# Patient Record
Sex: MALE | Race: WHITE | Employment: OTHER | URBAN - METROPOLITAN AREA
[De-identification: names, ages, dates, MRNs, and addresses within clinical notes are randomized per-mention and may not be internally consistent; named-entity substitution may affect disease eponyms.]

---

## 2017-03-19 ENCOUNTER — MYC MEDICAL ADVICE (OUTPATIENT)
Dept: PULMONOLOGY | Facility: CLINIC | Age: 77
End: 2017-03-19

## 2017-03-19 DIAGNOSIS — J84.9 ILD (INTERSTITIAL LUNG DISEASE) (H): ICD-10-CM

## 2017-03-21 RX ORDER — PREDNISONE 1 MG/1
TABLET ORAL
Qty: 120 TABLET | Refills: 3 | Status: SHIPPED | OUTPATIENT
Start: 2017-03-21 | End: 2017-05-04

## 2017-04-30 ENCOUNTER — MYC MEDICAL ADVICE (OUTPATIENT)
Dept: PULMONOLOGY | Facility: CLINIC | Age: 77
End: 2017-04-30

## 2017-04-30 DIAGNOSIS — J84.9 ILD (INTERSTITIAL LUNG DISEASE) (H): ICD-10-CM

## 2017-05-04 ENCOUNTER — MYC MEDICAL ADVICE (OUTPATIENT)
Dept: PULMONOLOGY | Facility: CLINIC | Age: 77
End: 2017-05-04

## 2017-05-04 DIAGNOSIS — J84.9 ILD (INTERSTITIAL LUNG DISEASE) (H): ICD-10-CM

## 2017-05-04 RX ORDER — PREDNISONE 1 MG/1
TABLET ORAL
Qty: 360 TABLET | Refills: 3 | Status: SHIPPED | OUTPATIENT
Start: 2017-05-04 | End: 2018-01-01

## 2017-05-04 RX ORDER — PREDNISONE 10 MG/1
10 TABLET ORAL DAILY
Qty: 90 TABLET | Refills: 3 | Status: SHIPPED | OUTPATIENT
Start: 2017-05-04 | End: 2018-05-01

## 2017-05-05 RX ORDER — BENZONATATE 100 MG/1
100 CAPSULE ORAL 3 TIMES DAILY PRN
Qty: 180 CAPSULE | Refills: 11 | Status: SHIPPED | OUTPATIENT
Start: 2017-05-05 | End: 2018-06-04

## 2017-06-12 DIAGNOSIS — J84.9 ILD (INTERSTITIAL LUNG DISEASE) (H): ICD-10-CM

## 2017-06-12 NOTE — LETTER
June 14, 2017      TO: J Luis Wolf  [address] [facility] [address]     Dear  J Luis Maryann,    Please call to schedule an appointment with your Rheumatologist, Edu Roberts MD.  You must be seen every 6 months. Last visit  9/27/16    Thank you  Sincerely, Rheumatology Clinic

## 2017-06-14 RX ORDER — ALENDRONATE SODIUM 70 MG/1
70 TABLET ORAL
Qty: 12 TABLET | Refills: 0 | Status: SHIPPED | OUTPATIENT
Start: 2017-06-14 | End: 2017-07-11

## 2017-06-14 NOTE — TELEPHONE ENCOUNTER
FOSAMAX      Last Written Prescription Date:  5/10/16  Last Fill Quantity: 12,   # refills: 3  Last Office Visit : 9/27/16  Future Office visit:  NONE  Routing refill request to provider for review/approval because:OVER DUE MD MARIONT.   MD/APPT.  REMINDER LETTER SENT TO PT

## 2017-06-14 NOTE — TELEPHONE ENCOUNTER
This patient needs to follow up with Álvaro Christianson or me to receive any more prescriptions from our office.

## 2017-06-15 NOTE — TELEPHONE ENCOUNTER
Called patient and spoke with him informing Fosamax was refilled by Dr. Roberts. Also informed that he needs to make a follow up appt with Dr. Roberts or Álvaro for further refills. Patient agrees, will call back when he has his calendar in front of him. Provided phone number and directions for calling and scheduling appt, verbalized understanding of plan.

## 2017-07-11 ENCOUNTER — OFFICE VISIT (OUTPATIENT)
Dept: RHEUMATOLOGY | Facility: CLINIC | Age: 77
End: 2017-07-11
Attending: INTERNAL MEDICINE
Payer: COMMERCIAL

## 2017-07-11 VITALS
HEART RATE: 77 BPM | HEIGHT: 67 IN | WEIGHT: 156.9 LBS | SYSTOLIC BLOOD PRESSURE: 118 MMHG | BODY MASS INDEX: 24.63 KG/M2 | DIASTOLIC BLOOD PRESSURE: 73 MMHG | OXYGEN SATURATION: 98 %

## 2017-07-11 DIAGNOSIS — Z79.60 LONG-TERM USE OF IMMUNOSUPPRESSANT MEDICATION: ICD-10-CM

## 2017-07-11 DIAGNOSIS — J84.9 ILD (INTERSTITIAL LUNG DISEASE) (H): ICD-10-CM

## 2017-07-11 DIAGNOSIS — M05.79 RHEUMATOID ARTHRITIS INVOLVING MULTIPLE SITES WITH POSITIVE RHEUMATOID FACTOR (H): Primary | ICD-10-CM

## 2017-07-11 LAB
ALBUMIN SERPL-MCNC: 3.4 G/DL (ref 3.4–5)
ALBUMIN UR-MCNC: 30 MG/DL
ALP SERPL-CCNC: 54 U/L (ref 40–150)
ALT SERPL W P-5'-P-CCNC: 31 U/L (ref 0–70)
ANION GAP SERPL CALCULATED.3IONS-SCNC: 2 MMOL/L (ref 3–14)
APPEARANCE UR: ABNORMAL
AST SERPL W P-5'-P-CCNC: 18 U/L (ref 0–45)
BASOPHILS # BLD AUTO: 0.1 10E9/L (ref 0–0.2)
BASOPHILS NFR BLD AUTO: 0.6 %
BILIRUB SERPL-MCNC: 0.3 MG/DL (ref 0.2–1.3)
BILIRUB UR QL STRIP: NEGATIVE
BUN SERPL-MCNC: 20 MG/DL (ref 7–30)
CALCIUM SERPL-MCNC: 9.4 MG/DL (ref 8.5–10.1)
CHLORIDE SERPL-SCNC: 99 MMOL/L (ref 94–109)
CO2 SERPL-SCNC: 38 MMOL/L (ref 20–32)
COLOR UR AUTO: YELLOW
CREAT SERPL-MCNC: 1.07 MG/DL (ref 0.66–1.25)
CRP SERPL-MCNC: 3.1 MG/L (ref 0–8)
DIFFERENTIAL METHOD BLD: ABNORMAL
EOSINOPHIL # BLD AUTO: 0.3 10E9/L (ref 0–0.7)
EOSINOPHIL NFR BLD AUTO: 2.9 %
ERYTHROCYTE [DISTWIDTH] IN BLOOD BY AUTOMATED COUNT: 13.9 % (ref 10–15)
ERYTHROCYTE [SEDIMENTATION RATE] IN BLOOD BY WESTERGREN METHOD: 17 MM/H (ref 0–20)
GFR SERPL CREATININE-BSD FRML MDRD: 67 ML/MIN/1.7M2
GLUCOSE SERPL-MCNC: 93 MG/DL (ref 70–99)
GLUCOSE UR STRIP-MCNC: NEGATIVE MG/DL
HCT VFR BLD AUTO: 44.3 % (ref 40–53)
HGB BLD-MCNC: 13.4 G/DL (ref 13.3–17.7)
HGB UR QL STRIP: NEGATIVE
HYALINE CASTS #/AREA URNS LPF: 1 /LPF (ref 0–2)
IMM GRANULOCYTES # BLD: 0.1 10E9/L (ref 0–0.4)
IMM GRANULOCYTES NFR BLD: 0.6 %
KETONES UR STRIP-MCNC: 5 MG/DL
LEUKOCYTE ESTERASE UR QL STRIP: NEGATIVE
LYMPHOCYTES # BLD AUTO: 2 10E9/L (ref 0.8–5.3)
LYMPHOCYTES NFR BLD AUTO: 18.1 %
MCH RBC QN AUTO: 29.5 PG (ref 26.5–33)
MCHC RBC AUTO-ENTMCNC: 30.2 G/DL (ref 31.5–36.5)
MCV RBC AUTO: 98 FL (ref 78–100)
MONOCYTES # BLD AUTO: 0.9 10E9/L (ref 0–1.3)
MONOCYTES NFR BLD AUTO: 8.1 %
MUCOUS THREADS #/AREA URNS LPF: PRESENT /LPF
NEUTROPHILS # BLD AUTO: 7.8 10E9/L (ref 1.6–8.3)
NEUTROPHILS NFR BLD AUTO: 69.7 %
NITRATE UR QL: NEGATIVE
NRBC # BLD AUTO: 0 10*3/UL
NRBC BLD AUTO-RTO: 0 /100
PH UR STRIP: 5 PH (ref 5–7)
PLATELET # BLD AUTO: 200 10E9/L (ref 150–450)
POTASSIUM SERPL-SCNC: 3.9 MMOL/L (ref 3.4–5.3)
PROT SERPL-MCNC: 7.1 G/DL (ref 6.8–8.8)
RBC # BLD AUTO: 4.54 10E12/L (ref 4.4–5.9)
RBC #/AREA URNS AUTO: 1 /HPF (ref 0–2)
SODIUM SERPL-SCNC: 139 MMOL/L (ref 133–144)
SP GR UR STRIP: 1.02 (ref 1–1.03)
URN SPEC COLLECT METH UR: ABNORMAL
UROBILINOGEN UR STRIP-MCNC: 2 MG/DL (ref 0–2)
WBC # BLD AUTO: 11.1 10E9/L (ref 4–11)
WBC #/AREA URNS AUTO: 1 /HPF (ref 0–2)

## 2017-07-11 PROCEDURE — 99212 OFFICE O/P EST SF 10 MIN: CPT | Mod: ZF

## 2017-07-11 PROCEDURE — 85652 RBC SED RATE AUTOMATED: CPT | Performed by: INTERNAL MEDICINE

## 2017-07-11 PROCEDURE — 85025 COMPLETE CBC W/AUTO DIFF WBC: CPT | Performed by: INTERNAL MEDICINE

## 2017-07-11 PROCEDURE — 86140 C-REACTIVE PROTEIN: CPT | Performed by: INTERNAL MEDICINE

## 2017-07-11 PROCEDURE — 80053 COMPREHEN METABOLIC PANEL: CPT | Performed by: INTERNAL MEDICINE

## 2017-07-11 PROCEDURE — 36415 COLL VENOUS BLD VENIPUNCTURE: CPT | Performed by: INTERNAL MEDICINE

## 2017-07-11 PROCEDURE — 81001 URINALYSIS AUTO W/SCOPE: CPT | Performed by: INTERNAL MEDICINE

## 2017-07-11 RX ORDER — ALENDRONATE SODIUM 70 MG/1
70 TABLET ORAL
Qty: 12 TABLET | Refills: 3 | Status: SHIPPED | OUTPATIENT
Start: 2017-07-11 | End: 2017-11-21

## 2017-07-11 RX ORDER — HYDROXYCHLOROQUINE SULFATE 200 MG/1
400 TABLET, FILM COATED ORAL DAILY
Qty: 180 TABLET | Refills: 3 | Status: SHIPPED | OUTPATIENT
Start: 2017-07-11 | End: 2017-11-21

## 2017-07-11 ASSESSMENT — PAIN SCALES - GENERAL: PAINLEVEL: NO PAIN (0)

## 2017-07-11 NOTE — NURSING NOTE
"Chief Complaint   Patient presents with     RECHECK     Follow up for RA       Initial /73  Pulse 77  Ht 1.702 m (5' 7\")  Wt 71.2 kg (156 lb 14.4 oz)  SpO2 98%  BMI 24.57 kg/m2 Estimated body mass index is 24.57 kg/(m^2) as calculated from the following:    Height as of this encounter: 1.702 m (5' 7\").    Weight as of this encounter: 71.2 kg (156 lb 14.4 oz).  Medication Reconciliation: complete   Leanna Marmolejo CMA    "

## 2017-07-11 NOTE — PROGRESS NOTES
returns for management of RA complicated by ILD. +CCP, +RF, -GIANCARLO. No history of erosions. FVC 39%/DLCO 58% . Previously failed methotrexate, azathioprine, and mycophenolate. Prednisone dependent.    His breathing is reportedly stable and no chest infection to report. O2 is 2L per minute and is used 24 hours. PFTs have been stable.    He has some left knee crepitans. He has right leg weakness due to radiculopathy, and this can cause some instability. But otherwise the left knee is fine and causes no pains. He has rotator cuff disease on the right.    No raynaud's, but he reports poor finger blood flow. No arthritis in the fingers. No fractures in the interval. Energy is stable but not great. No AM stiffness.    Prednisone 14 mg daily to control lung progression. Hydroxychlorquine 400 mg daily with normal eye check. Tylenol 500 mg TID and Advil 200 mg TID to treat radicular leg pain. Alendronate 70 mg once weekly is well tolerated.    PMI:  Medical-RA with ILD, atopic rhinitis, hip bursitis and gluteal tendinopathy, osteoporosis with T4-5 vertebral fractures (T = -2.9 ), hyperlipidemia, right rotator cuff disease, DDD  Surgical-appendectomy, bunion surgery and toe surgical straightening, right elbow surgery  Injury-right elbow laceration, right knee injury    SH:  Retired ,  with two children. Former smoker (5 years with occasional cigarette use), 1 EtOH per day, no illicit drug use. Silica and Right handed.    FH:  Mother lived to very old age, now dead  Father dead at 80 with heart disease and OA  Brother with celiac disease  Children and grandchildren are healthy    PMSF history personally reviewed by me today in the clinic.    ROS:  +fragile skin with easy bruising  +seasonal allergy  Remainder of the 14 point ROS obtained and found negative.    Physical Exam:  Constitutional: WD-WN-WG cooperative; +thin  Eyes: nl EOM, PERRLA, vision, conjunctiva, sclera  ENT: nl  external ears, nose, hearing, lips, teeth, gums, throat  Neck: no mass or thyroid enlargement  Resp: +lungs with bibasilar rales; nl to palpation, nl effort  CV: irregular rhythm, no murmurs, rubs or gallops, no edema  GI: no ABD mass or tenderness, no HSM  : not tested  Lymph: no cervical or epitrochlear nodes  MS: +heberden's nodes with diffuse angular deformities; bilateral left>right wrist reduced extension and 1st CMC squaring;  +right shoulder with only minimal 30 degrees abduction and limited rotation; neck rotation 45 degrees bilaterally and no extension; ; All other TMJ, neck, shoulder, elbow, wrist, hand, spine, hip, knee, ankle, and foot joints were examined and otherwise found normal. Normal  strength. Intact tuck. Fairly intact prayer.  Skin: no nail pitting, alopecia, rash  Neuro: nl cranial nerves, strength, sensation, no DTRs.   Psych: nl judgement, orientation, memory, affect.    Laboratory:    Component      Latest Ref Rng & Units 9/30/2016   WBC      4.0 - 11.0 10e9/L 11.1 (H)   RBC Count      4.4 - 5.9 10e12/L 4.58   Hemoglobin      13.3 - 17.7 g/dL 13.6   Hematocrit      40.0 - 53.0 % 43.8   MCV      78 - 100 fl 96   MCH      26.5 - 33.0 pg 29.7   MCHC      31.5 - 36.5 g/dL 31.1 (L)   RDW      10.0 - 15.0 % 13.3   Platelet Count      150 - 450 10e9/L 203   Diff Method       Automated Method   % Neutrophils      % 88.3   % Lymphocytes      % 7.8   % Monocytes      % 2.4   % Eosinophils      % 0.6   % Basophils      % 0.3   % Immature Granulocytes      % 0.6   Nucleated RBCs      0 /100 0   Absolute Neutrophil      1.6 - 8.3 10e9/L 9.8 (H)   Absolute Lymphocytes      0.8 - 5.3 10e9/L 0.9   Absolute Monocytes      0.0 - 1.3 10e9/L 0.3   Absolute Eosinophils      0.0 - 0.7 10e9/L 0.1   Absolute Basophils      0.0 - 0.2 10e9/L 0.0   Abs Immature Granulocytes      0 - 0.4 10e9/L 0.1   Absolute Nucleated RBC       0.0   Sodium      133 - 144 mmol/L 137   Potassium      3.4 - 5.3 mmol/L 4.2    Chloride      94 - 109 mmol/L 98   Carbon Dioxide      20 - 32 mmol/L 36 (H)   Anion Gap      3 - 14 mmol/L 3   Glucose      70 - 99 mg/dL 146 (H)   Urea Nitrogen      7 - 30 mg/dL 22   Creatinine      0.66 - 1.25 mg/dL 0.86   GFR Estimate      >60 mL/min/1.7m2 86   GFR Estimate If Black      >60 mL/min/1.7m2 >90 . . .   Calcium      8.5 - 10.1 mg/dL 9.2   Bilirubin Total      0.2 - 1.3 mg/dL 0.2   Albumin      3.4 - 5.0 g/dL 3.6   Protein Total      6.8 - 8.8 g/dL 7.2   Alkaline Phosphatase      40 - 150 U/L 61   ALT      0 - 70 U/L 33   AST      0 - 45 U/L 27   Color Urine       Straw   Appearance Urine       Clear   Glucose Urine      NEG mg/dL Negative   Bilirubin Urine      NEG Negative   Ketones Urine      NEG mg/dL Negative   Specific Gravity Urine      1.003 - 1.035 1.006   Blood Urine      NEG Negative   pH Urine      5.0 - 7.0 pH 6.5   Protein Albumin Urine      NEG mg/dL Negative   Urobilinogen mg/dL      0.0 - 2.0 mg/dL Normal   Nitrite Urine      NEG Negative   Leukocyte Esterase Urine      NEG Negative   Source       Midstream Urine   WBC Urine      0 - 2 /HPF <1   RBC Urine      0 - 2 /HPF 0   Mucous Urine      NEG /LPF Present (A)   CRP Inflammation      0.0 - 8.0 mg/L 5.5   Sed Rate      0 - 20 mm/h 15       Impression:    Seropositive Rheumatoid Arthritis-seropositive and associated with ILD, but no overt synovitis. He remains stable on hydroxychloroquine plus his prednisone regimen, and tolerates this well. Based on this I will continue with the current hydroxychloroquine medication.    Bone health-with longstanding prednisone use. He is now on active management with fosamax and tolerates this well. We will continue this.    Irregular heart rhythm-this is a new finding for me, but the patient reassures me that this has been observed previously. I cannot find and EKG in the EMR. He is scheduled for Echo tomorrow, and I will follow up on this to consider EKG at that time.    Long term management  of immunosuppression-I will get lab toxicity screening today. Continue the yearly eye checks.    Plan RTC in 6 months.

## 2017-07-11 NOTE — LETTER
7/11/2017      RE: J Luis Wolf  [address] [facility] [id number]        returns for management of RA complicated by ILD. +CCP, +RF, -GIANCARLO. No history of erosions. FVC 39%/DLCO 58% . Previously failed methotrexate, azathioprine, and mycophenolate. Prednisone dependent.    His breathing is reportedly stable and no chest infection to report. O2 is 2L per minute and is used 24 hours. PFTs have been stable.    He has some left knee crepitans. He has right leg weakness due to radiculopathy, and this can cause some instability. But otherwise the left knee is fine and causes no pains. He has rotator cuff disease on the right.    No raynaud's, but he reports poor finger blood flow. No arthritis in the fingers. No fractures in the interval. Energy is stable but not great. No AM stiffness.    Prednisone 14 mg daily to control lung progression. Hydroxychlorquine 400 mg daily with normal eye check. Tylenol 500 mg TID and Advil 200 mg TID to treat radicular leg pain. Alendronate 70 mg once weekly is well tolerated.    PMI:  Medical-RA with ILD, atopic rhinitis, hip bursitis and gluteal tendinopathy, osteoporosis with T4-5 vertebral fractures (T = -2.9 ), hyperlipidemia, right rotator cuff disease, DDD  Surgical-appendectomy, bunion surgery and toe surgical straightening, right elbow surgery  Injury-right elbow laceration, right knee injury    SH:  Retired ,  with two children. Former smoker (5 years with occasional cigarette use), 1 EtOH per day, no illicit drug use. Silica and Right handed.    FH:  Mother lived to very old age, now dead  Father dead at 80 with heart disease and OA  Brother with celiac disease  Children and grandchildren are healthy    PMSF history personally reviewed by me today in the clinic.    ROS:  +fragile skin with easy bruising  +seasonal allergy  Remainder of the 14 point ROS obtained and found negative.    Physical Exam:  Constitutional:  WD-WN-WG cooperative; +thin  Eyes: nl EOM, PERRLA, vision, conjunctiva, sclera  ENT: nl external ears, nose, hearing, lips, teeth, gums, throat  Neck: no mass or thyroid enlargement  Resp: +lungs with bibasilar rales; nl to palpation, nl effort  CV: irregular rhythm, no murmurs, rubs or gallops, no edema  GI: no ABD mass or tenderness, no HSM  : not tested  Lymph: no cervical or epitrochlear nodes  MS: +heberden's nodes with diffuse angular deformities; bilateral left>right wrist reduced extension and 1st CMC squaring;  +right shoulder with only minimal 30 degrees abduction and limited rotation; neck rotation 45 degrees bilaterally and no extension; ; All other TMJ, neck, shoulder, elbow, wrist, hand, spine, hip, knee, ankle, and foot joints were examined and otherwise found normal. Normal  strength. Intact tuck. Fairly intact prayer.  Skin: no nail pitting, alopecia, rash  Neuro: nl cranial nerves, strength, sensation, no DTRs.   Psych: nl judgement, orientation, memory, affect.    Laboratory:    Component      Latest Ref Rng & Units 9/30/2016   WBC      4.0 - 11.0 10e9/L 11.1 (H)   RBC Count      4.4 - 5.9 10e12/L 4.58   Hemoglobin      13.3 - 17.7 g/dL 13.6   Hematocrit      40.0 - 53.0 % 43.8   MCV      78 - 100 fl 96   MCH      26.5 - 33.0 pg 29.7   MCHC      31.5 - 36.5 g/dL 31.1 (L)   RDW      10.0 - 15.0 % 13.3   Platelet Count      150 - 450 10e9/L 203   Diff Method       Automated Method   % Neutrophils      % 88.3   % Lymphocytes      % 7.8   % Monocytes      % 2.4   % Eosinophils      % 0.6   % Basophils      % 0.3   % Immature Granulocytes      % 0.6   Nucleated RBCs      0 /100 0   Absolute Neutrophil      1.6 - 8.3 10e9/L 9.8 (H)   Absolute Lymphocytes      0.8 - 5.3 10e9/L 0.9   Absolute Monocytes      0.0 - 1.3 10e9/L 0.3   Absolute Eosinophils      0.0 - 0.7 10e9/L 0.1   Absolute Basophils      0.0 - 0.2 10e9/L 0.0   Abs Immature Granulocytes      0 - 0.4 10e9/L 0.1   Absolute Nucleated RBC        0.0   Sodium      133 - 144 mmol/L 137   Potassium      3.4 - 5.3 mmol/L 4.2   Chloride      94 - 109 mmol/L 98   Carbon Dioxide      20 - 32 mmol/L 36 (H)   Anion Gap      3 - 14 mmol/L 3   Glucose      70 - 99 mg/dL 146 (H)   Urea Nitrogen      7 - 30 mg/dL 22   Creatinine      0.66 - 1.25 mg/dL 0.86   GFR Estimate      >60 mL/min/1.7m2 86   GFR Estimate If Black      >60 mL/min/1.7m2 >90 . . .   Calcium      8.5 - 10.1 mg/dL 9.2   Bilirubin Total      0.2 - 1.3 mg/dL 0.2   Albumin      3.4 - 5.0 g/dL 3.6   Protein Total      6.8 - 8.8 g/dL 7.2   Alkaline Phosphatase      40 - 150 U/L 61   ALT      0 - 70 U/L 33   AST      0 - 45 U/L 27   Color Urine       Straw   Appearance Urine       Clear   Glucose Urine      NEG mg/dL Negative   Bilirubin Urine      NEG Negative   Ketones Urine      NEG mg/dL Negative   Specific Gravity Urine      1.003 - 1.035 1.006   Blood Urine      NEG Negative   pH Urine      5.0 - 7.0 pH 6.5   Protein Albumin Urine      NEG mg/dL Negative   Urobilinogen mg/dL      0.0 - 2.0 mg/dL Normal   Nitrite Urine      NEG Negative   Leukocyte Esterase Urine      NEG Negative   Source       Midstream Urine   WBC Urine      0 - 2 /HPF <1   RBC Urine      0 - 2 /HPF 0   Mucous Urine      NEG /LPF Present (A)   CRP Inflammation      0.0 - 8.0 mg/L 5.5   Sed Rate      0 - 20 mm/h 15       Impression:    Seropositive Rheumatoid Arthritis-seropositive and associated with ILD, but no overt synovitis. He remains stable on hydroxychloroquine plus his prednisone regimen, and tolerates this well. Based on this I will continue with the current hydroxychloroquine medication.    Bone health-with longstanding prednisone use. He is now on active management with fosamax and tolerates this well. We will continue this.    Irregular heart rhythm-this is a new finding for me, but the patient reassures me that this has been observed previously. I cannot find and EKG in the EMR. He is scheduled for Echo tomorrow, and  I will follow up on this to consider EKG at that time.    Long term management of immunosuppression-I will get lab toxicity screening today. Continue the yearly eye checks.    Plan RTC in 6 months.      Edu Roberts MD

## 2017-07-11 NOTE — MR AVS SNAPSHOT
After Visit Summary   7/11/2017    J Luis Wolf    MRN: [id number]           Patient Information     Date Of Birth          1940        Visit Information        Provider Department      7/11/2017 8:00 AM Edu Roberts MD [facility] Rheumatology        Today's Diagnoses     Rheumatoid arthritis involving multiple sites with positive rheumatoid factor (H)    -  1    ILD (interstitial lung disease) (H)        Long-term use of immunosuppressant medication           Follow-ups after your visit        Follow-up notes from your care team     Return in about 6 months (around 1/11/2018).      Your next 10 appointments already scheduled     Jul 11, 2017  8:30 AM CDT   Lab with  LAB   [facility] Lab ([facility])    [address] [place] [id number] [phone]            Jul 12, 2017 11:00 AM CDT   Ech Complete with ECHCR2   [facility] Echo ([facility])    [address] [place] [id number] [phone]           1.  Please bring or wear a comfortable two-piece outfit. 2.  You may eat, drink and take your normal medicines. 3.  For any questions that cannot be answered, please contact the ordering physician            Jul 12, 2017  1:00 PM CDT   FULL PULMONARY FUNCTION with  PFL C   [facility] Pulmonary Function Testing ([facility])    [address] [place] [id number] [phone]            Jul 12, 2017  2:00 PM CDT   (Arrive by 1:45 PM)   Return Visit with Bill Kraft MD   [facility] ([facility])    [address] [place] [id number] [phone]            Dec 26, 2017  9:30 AM CST   (Arrive by 9:15 AM)   Return Visit with Edu Roberts MD   [facility] Rheumatology ([facility])    [address] [place] [address]  "[id number] [phone]              Who to contact     If you have questions or need follow up information about today's clinic visit or your schedule please contact [facility] RHEUMATOLOGY directly at [phone].  Normal or non-critical lab and imaging results will be communicated to you by Business Insiderhart, letter or phone within 4 business days after the clinic has received the results. If you do not hear from us within 7 days, please contact the clinic through Biartt or phone. If you have a critical or abnormal lab result, we will notify you by phone as soon as possible.  Submit refill requests through Scoopshot or call your pharmacy and they will forward the refill request to us. Please allow 3 business days for your refill to be completed.          Additional Information About Your Visit        Scoopshot Information     Scoopshot gives you secure access to your electronic health record. If you see a primary care provider, you can also send messages to your care team and make appointments. If you have questions, please call your primary care clinic.  If you do not have a primary care provider, please call [phone] and they will assist you.        Care EveryWhere ID     This is your Care EveryWhere ID. This could be used by other organizations to access your [place] medical records  [id number]        Your Vitals Were     Pulse Height Pulse Oximetry BMI (Body Mass Index)          77 1.702 m (5' 7\") 98% 24.57 kg/m2         Blood Pressure from Last 3 Encounters:   07/11/17 118/73   12/14/16 (!) 132/91   09/27/16 125/82    Weight from Last 3 Encounters:   07/11/17 71.2 kg (156 lb 14.4 oz)   12/16/16 70.8 kg (156 lb)   12/14/16 71.5 kg (157 lb 11.2 oz)              We Performed the Following     CBC with platelets differential     Comprehensive metabolic panel     CRP inflammation     Erythrocyte sedimentation rate auto     UA with Microscopic          Where to get your medicines      These medications were sent to " [facility] [address] [place] [address] [address] AT [facility] [address]  [address], [facility] [address]     Phone:  [phone]     alendronate 70 MG tablet    hydroxychloroquine 200 MG tablet          Primary Care Provider Office Phone # Fax #    Mario Foster [phone]       [facility] [address]  [facility] [address]        Equal Access to Services     NAN MARCANO : Hadii aad ku hadasho Soomaali, waaxda luqadaha, qaybta kaalmada adeegyada, waxay idiin hayaan adeeg kharash la'chandrakant . So [facility] [phone].    ATENCIÓN: Si habla español, tiene a rivas disposición servicios gratuitos de asistencia lingüística. [facility] [phone].    We comply with applicable federal civil rights laws and [place] laws. We do not discriminate on the basis of race, color, national origin, age, disability sex, sexual orientation or gender identity.            Thank you!     Thank you for choosing [facility] RHEUMATOLOGY  for your care. Our goal is always to provide you with excellent care. Hearing back from our patients is one way we can continue to improve our services. Please take a few minutes to complete the written survey that you may receive in the mail after your visit with us. Thank you!             Your Updated Medication List - Protect others around you: Learn how to safely use, store and throw away your medicines at www.disposemymeds.org.          This list is accurate as of: 7/11/17  8:14 AM.  Always use your most recent med list.                   Brand Name Dispense Instructions for use Diagnosis    ADVIL PO      Take 200 mg by mouth        alendronate 70 MG tablet    FOSAMAX    12 tablet    Take 1 tablet (70 mg) by mouth every 7 days    ILD (interstitial lung disease) (H), Long-term use of immunosuppressant medication, Rheumatoid arthritis involving multiple sites with positive rheumatoid factor (H)       atorvastatin 10 MG tablet    LIPITOR     Take 5  mg by mouth every other day        azelastine 0.1 % spray    ASTELIN     Spray 1 spray into both nostrils 2 times daily        benzonatate 100 MG capsule    TESSALON    180 capsule    Take 1 capsule (100 mg) by mouth 3 times daily as needed for cough    ILD (interstitial lung disease) (H)       cetirizine 10 MG tablet    zyrTEC     Take 10 mg by mouth daily        D3-1000 PO      Take by mouth daily        DAILY MULTIVITAMIN PO      Take 2 tablets by mouth every morning        hydroxychloroquine 200 MG tablet    PLAQUENIL    180 tablet    Take 2 tablets (400 mg) by mouth daily    ILD (interstitial lung disease) (H)       LOSARTAN POTASSIUM PO      Take 25 mg by mouth daily        * predniSONE 1 MG tablet    DELTASONE    360 tablet    Patient takes 14 mg daily take 4 1mg tabs by mouth daily with 1 10mg tab.    ILD (interstitial lung disease) (H)       * predniSONE 10 MG tablet    DELTASONE    90 tablet    Take 1 tablet (10 mg) by mouth daily Patient takes a total dose of 13mg daily    ILD (interstitial lung disease) (H)       SM CALCIUM CITRATE+/VIT D3 PO      Take by mouth 2 times daily 630/500iu        sulfamethoxazole-trimethoprim 800-160 MG per tablet    BACTRIM DS/SEPTRA DS    36 tablet    Take 1 tablet by mouth Every Mon, Wed, Fri Morning 400-80    ILD (interstitial lung disease) (H)       TYLENOL PO      Take 325 mg by mouth as needed for mild pain or fever    ILD (interstitial lung disease) (H), Long-term use of immunosuppressant medication, Rheumatoid arthritis involving multiple sites with positive rheumatoid factor (H)       * Notice:  This list has 2 medication(s) that are the same as other medications prescribed for you. Read the directions carefully, and ask your doctor or other care provider to review them with you.

## 2017-07-12 ENCOUNTER — OFFICE VISIT (OUTPATIENT)
Dept: PULMONOLOGY | Facility: CLINIC | Age: 77
End: 2017-07-12
Attending: INTERNAL MEDICINE
Payer: COMMERCIAL

## 2017-07-12 ENCOUNTER — RADIANT APPOINTMENT (OUTPATIENT)
Dept: CARDIOLOGY | Facility: CLINIC | Age: 77
End: 2017-07-12

## 2017-07-12 VITALS
HEART RATE: 82 BPM | SYSTOLIC BLOOD PRESSURE: 126 MMHG | WEIGHT: 157.7 LBS | RESPIRATION RATE: 16 BRPM | TEMPERATURE: 98 F | DIASTOLIC BLOOD PRESSURE: 75 MMHG | OXYGEN SATURATION: 94 % | HEIGHT: 66 IN | BODY MASS INDEX: 25.34 KG/M2

## 2017-07-12 DIAGNOSIS — R06.00 DYSPNEA, UNSPECIFIED TYPE: ICD-10-CM

## 2017-07-12 DIAGNOSIS — J84.9 ILD (INTERSTITIAL LUNG DISEASE) (H): Primary | ICD-10-CM

## 2017-07-12 PROCEDURE — 99212 OFFICE O/P EST SF 10 MIN: CPT | Mod: ZF

## 2017-07-12 ASSESSMENT — PAIN SCALES - GENERAL: PAINLEVEL: NO PAIN (0)

## 2017-07-12 NOTE — MR AVS SNAPSHOT
After Visit Summary   7/12/2017    J Luis Wolf    MRN: [id number]           Patient Information     Date Of Birth          1940        Visit Information        Provider Department      7/12/2017 2:00 PM Bill Kraft MD [facility]        Today's Diagnoses     ILD (interstitial lung disease) (H)    -  1       Follow-ups after your visit        Follow-up notes from your care team     Return in about 3 months (around 10/12/2017).      Your next 10 appointments already scheduled     Oct 11, 2017  9:30 AM CDT   FULL PULMONARY FUNCTION with  PFL B   [facility] Pulmonary Function Testing ([facility])    [address] [id number] [phone]            Oct 11, 2017 10:30 AM CDT   (Arrive by 10:15 AM)   Return Visit with Bill Kraft MD   [facility] ([facility])    [address] [place] [address] [id number] [phone]            Dec 26, 2017  9:30 AM CST   (Arrive by 9:15 AM)   Return Visit with Edu Roberts MD   [facility] Rheumatology ([facility])    [address] [place] [address] [id number] [phone]              Future tests that were ordered for you today     Open Future Orders        Priority Expected Expires Ordered    General PFT Lab (Please always keep checked) Routine  7/12/2018 7/12/2017    Pulmonary Function Test Routine  7/12/2018 7/12/2017            Who to contact     If you have questions or need follow up information about today's clinic visit or your schedule please contact [facility] directly at [phone].  Normal or non-critical lab and imaging results will be communicated to you by MyChart, letter or phone within 4 business days after the clinic has received the results. If you do not hear from us within 7 days, please contact the  "clinic through [employer] or phone. If you have a critical or abnormal lab result, we will notify you by phone as soon as possible.  Submit refill requests through [employer] or call your pharmacy and they will forward the refill request to us. Please allow 3 business days for your refill to be completed.          Additional Information About Your Visit        PharmMDhart Information     [employer] gives you secure access to your electronic health record. If you see a primary care provider, you can also send messages to your care team and make appointments. If you have questions, please call your primary care clinic.  If you do not have a primary care provider, please call [phone] and they will assist you.        Care EveryWhere ID     This is your Care EveryWhere ID. This could be used by other organizations to access your [facility] medical records  [id number]        Your Vitals Were     Pulse Temperature Respirations Height Pulse Oximetry BMI (Body Mass Index)    82 98  F (36.7  C) (Oral) 16 1.676 m (5' 6\") 94% 25.45 kg/m2       Blood Pressure from Last 3 Encounters:   07/12/17 126/75   07/11/17 118/73   12/14/16 (!) 132/91    Weight from Last 3 Encounters:   07/12/17 71.5 kg (157 lb 11.2 oz)   07/11/17 71.2 kg (156 lb 14.4 oz)   12/16/16 70.8 kg (156 lb)               Primary Care Provider Office Phone # Fax #    Mario Foster [phone]       [facility] [address] [facility] [address]        Equal Access to Services     HARPREET MARCANO : Hadii sendy ku hadasho Soomaali, waaxda luqadaha, qaybta kaalmada carmen, jose alberto bullock . So [facility] [phone].    ATENCIÓN: Si habla español, tiene a rivas disposición servicios gratuitos de asistencia lingüística. Llame al [phone].    We comply with applicable federal civil rights laws and [place] laws. We do not discriminate on the basis of race, color, national origin, age, disability sex, sexual orientation or gender " identity.            Thank you!     Thank you for choosing [facility]  for your care. Our goal is always to provide you with excellent care. Hearing back from our patients is one way we can continue to improve our services. Please take a few minutes to complete the written survey that you may receive in the mail after your visit with us. Thank you!             Your Updated Medication List - Protect others around you: Learn how to safely use, store and throw away your medicines at www.disposemymeds.org.          This list is accurate as of: 7/12/17  2:19 PM.  Always use your most recent med list.                   Brand Name Dispense Instructions for use Diagnosis    ADVIL PO      Take 200 mg by mouth        alendronate 70 MG tablet    FOSAMAX    12 tablet    Take 1 tablet (70 mg) by mouth every 7 days    ILD (interstitial lung disease) (H), Long-term use of immunosuppressant medication, Rheumatoid arthritis involving multiple sites with positive rheumatoid factor (H)       atorvastatin 10 MG tablet    LIPITOR     Take 5 mg by mouth every other day        azelastine 0.1 % spray    ASTELIN     Spray 1 spray into both nostrils 2 times daily        benzonatate 100 MG capsule    TESSALON    180 capsule    Take 1 capsule (100 mg) by mouth 3 times daily as needed for cough    ILD (interstitial lung disease) (H)       cetirizine 10 MG tablet    zyrTEC     Take 10 mg by mouth daily        D3-1000 PO      Take by mouth daily        DAILY MULTIVITAMIN PO      Take 2 tablets by mouth every morning        hydroxychloroquine 200 MG tablet    PLAQUENIL    180 tablet    Take 2 tablets (400 mg) by mouth daily    ILD (interstitial lung disease) (H)       LOSARTAN POTASSIUM PO      Take 25 mg by mouth daily        * predniSONE 1 MG tablet    DELTASONE    360 tablet    Patient takes 14 mg daily take 4 1mg tabs by mouth daily with 1 10mg tab.    ILD (interstitial lung disease) (H)       * predniSONE 10  MG tablet    DELTASONE    90 tablet    Take 1 tablet (10 mg) by mouth daily Patient takes a total dose of 13mg daily    ILD (interstitial lung disease) (H)       SM CALCIUM CITRATE+/VIT D3 PO      Take by mouth 2 times daily 630/500iu        sulfamethoxazole-trimethoprim 800-160 MG per tablet    BACTRIM DS/SEPTRA DS    36 tablet    Take 1 tablet by mouth Every Mon, Wed, Fri Morning 400-80    ILD (interstitial lung disease) (H)       TYLENOL PO      Take 325 mg by mouth as needed for mild pain or fever    ILD (interstitial lung disease) (H), Long-term use of immunosuppressant medication, Rheumatoid arthritis involving multiple sites with positive rheumatoid factor (H)       * Notice:  This list has 2 medication(s) that are the same as other medications prescribed for you. Read the directions carefully, and ask your doctor or other care provider to review them with you.

## 2017-07-12 NOTE — PROGRESS NOTES
Answers for HPI/ROS submitted by the patient on 7/12/2017   General Symptoms: No  Skin Symptoms: No  HENT Symptoms: No  EYE SYMPTOMS: Yes  HEART SYMPTOMS: No  LUNG SYMPTOMS: No  INTESTINAL SYMPTOMS: No  URINARY SYMPTOMS: No  REPRODUCTIVE SYMPTOMS: No  SKELETAL SYMPTOMS: No  BLOOD SYMPTOMS: No  NERVOUS SYSTEM SYMPTOMS: No    [facility]   Pulmonary Clinic Follow Up Note  July 14, 2017      J Luis Wolf MRN# [id number]   Age: 77 year old YOB: 1940     Date of Consultation: July 14, 2017    Primary care provider: Mario Foster     History taken from; Patient      J Luis Wolf is a 77 year old male seen in the Pulmonary Clinic  for f/u.  Chief Complaint   Patient presents with     Interstitial Lung Disease (ILD)     Follow up visit with J Luis concerning his ILD   .          Pulmonary Problem List / Reason for follow up:   1. ILD associated with rheumatoid arthritis           Assessment and Plan:     Assesment and plan.  Patient is 77-year-old gentleman with slight decrease in his PFT,  however patient is not feeling that his symptoms and a worse.  IILD. Interstitial lung disease patient is currently stable. Will continue with the prednisone 14 mg a day. Patient will continue also with Bactrim. Lung function test revealed a slight decline in FVC..  We will repeat PFT in 3 months.   We explain the plan to the patient including risks and Benefits. Patient expressed understanding and aggreed with plan. Thank you very much for the opportunity to participate in the care of this very pleasent patient. Thank you      Return visit in 3 months      Bill Kraft M.D.         History of Present Illness / Interval History:   Chief complaint. 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 Patient is 77-year-old gentleman with history of interstitial lung disease associated with rheumatoid arthritis. Patient is on 14  mg of the prednisone and patients SEC is slightly decreased. Patient did not notice an increase in his symptoms.Overall patient is feeling same as on the last visit.  Assessm                  Pulmonary Data:         Exposure history: Asbestos;  No , TB;  No , Radiation;   No          Medications:     Current Outpatient Prescriptions   Medication Sig     Acetaminophen (TYLENOL PO) Take 325 mg by mouth as needed for mild pain or fever     alendronate (FOSAMAX) 70 MG tablet Take 1 tablet (70 mg) by mouth every 7 days     hydroxychloroquine (PLAQUENIL) 200 MG tablet Take 2 tablets (400 mg) by mouth daily     benzonatate (TESSALON) 100 MG capsule Take 1 capsule (100 mg) by mouth 3 times daily as needed for cough     predniSONE (DELTASONE) 1 MG tablet Patient takes 14 mg daily take 4 1mg tabs by mouth daily with 1 10mg tab.     predniSONE (DELTASONE) 10 MG tablet Take 1 tablet (10 mg) by mouth daily Patient takes a total dose of 13mg daily     sulfamethoxazole-trimethoprim (BACTRIM DS/SEPTRA DS) 800-160 MG per tablet Take 1 tablet by mouth Every Mon, Wed, Fri Morning 400-80     Ibuprofen (ADVIL PO) Take 200 mg by mouth     cetirizine (ZYRTEC) 10 MG tablet Take 10 mg by mouth daily     LOSARTAN POTASSIUM PO Take 25 mg by mouth daily     azelastine (ASTELIN) 137 MCG/SPRAY nasal spray [place] 1 spray into both nostrils 2 times daily     atorvastatin (LIPITOR) 10 MG tablet Take 5 mg by mouth every other day      Calcium Citrate-Vitamin D (SM CALCIUM CITRATE+/VIT D3 PO) Take by mouth 2 times daily 630/500iu     Cholecalciferol (D3-1000 PO) Take by mouth daily     Multiple Vitamin (DAILY MULTIVITAMIN PO) Take 2 tablets by mouth every morning     No current facility-administered medications for this visit.              Past Medical History:     Past Medical History:   Diagnosis Date     Rheumatoid arthritis (H)              Past Surgical History:     Past Surgical History:   Procedure Laterality Date     APPENDECTOMY  1956      BACK SURGERY  2014    Compression fx thoracic vertebra     COLONOSCOPY  2014     ORTHOPEDIC SURGERY  2015    Bunionectomy lt foot     SOFT TISSUE SURGERY  2015    Bursitis rt hip cortisone injection 11 15 2015     THORACIC SURGERY  2012    Pulmonary fibrosis             Social History:     Social History     Social History     Marital status:      Spouse name: N/A     Number of children: N/A     Years of education: N/A     Occupational History     Not on file.     Social History Main Topics     Smoking status: Former Smoker     Packs/day: 0.50     Years: 5.00     Types: Cigarettes     Start date: 1957     Quit date: 1962     Smokeless tobacco: Not on file     Alcohol use 0.0 oz/week     0 Standard drinks or equivalent per week      Comment: Occasionally     Drug use: Not on file     Sexual activity: Not on file     Other Topics Concern     Not on file     Social History Narrative             Family History:     Family History   Problem Relation Age of Onset     Coronary Artery Disease Father      Age 77  age 80     Hyperlipidemia Father      Hypertension Mother       age 105             Immunization:     There is no immunization history on file for this patient.           Review of Systems:   I have done 10 points of review systems and pertinent findings are as above, otherwise negative.             Physical Examination:   General: Alert, oriented, not in distress  B/P: 126/75, T: 98, P: 82, R: 16  HEENT: neck supple, symmetrical, no lymph node enlargement, thyromegaly, bruit, JVD, pupils are isocoric and equally responsive to the light.   Lungs: both hemithoraces are symmetrical, normal to palpation, no dullness to percussion, auscultation of lungs revealed bibasilar crackles  CVS: Normal S1 and S2, no additional heart sounds, murmur, rub, normal peripheral pulses  Abdomen: Bowel sounds present, soft, non tender, non distended, no organomegaly, ascitis, mass  Extremities/musculoskeletal: no  peripheral edema, deformity, cyanosis, clubbing  Neurology: alert, orientedx3, no motor/sensorial deficits, cranial nerves grossly normal  Skin: no rash  Psychiatry: Mood and affect are appropriate             DATA:     CBC RESULTS:     Recent Labs   Lab Test  07/11/17   0829  09/30/16   1421   WBC  11.1*  11.1*   RBC  4.54  4.58   HGB  13.4  13.6   HCT  44.3  43.8   PLT  200  203       Basic Metabolic Panel:  Recent Labs   Lab Test  07/11/17   0829  09/30/16   1421   NA  139  137   POTASSIUM  3.9  4.2   CHLORIDE  99  98   CO2  38*  36*   BUN  20  22   CR  1.07  0.86   GLC  93  146*   SILVINO  9.4  9.2       INRNo results for input(s): INR in the last 45855 hours.     PFT   PFT Latest Ref Rng & Units 7/12/2017   FVC L 1.25   FEV1 L 0.92   FVC% % 36   FEV1% % 35               Thank you for allowing me participate in the care of J Luis Wolf.    Bill Kraft M.D.  Associate Professor of Medicine  Pulmonary, Allergy, Critical Care and Sleep

## 2017-07-12 NOTE — NURSING NOTE
Chief Complaint   Patient presents with     Interstitial Lung Disease (ILD)     Follow up visit with J Luis concerning his ILD     Toni Lackey CMA at 1:39 PM on 7/12/2017

## 2017-07-13 LAB
DLCOCOR-%PRED-PRE: 51 %
DLCOCOR-PRE: 11.79 ML/MIN/MMHG
DLCOUNC-%PRED-PRE: 49 %
DLCOUNC-PRE: 11.38 ML/MIN/MMHG
DLCOUNC-PRED: 22.77 ML/MIN/MMHG
ERV-%PRED-PRE: 32 %
ERV-PRE: 0.26 L
ERV-PRED: 0.8 L
EXPTIME-PRE: 7.42 SEC
FEF2575-%PRED-PRE: 27 %
FEF2575-PRE: 0.53 L/SEC
FEF2575-PRED: 1.93 L/SEC
FEFMAX-%PRED-PRE: 68 %
FEFMAX-PRE: 4.56 L/SEC
FEFMAX-PRED: 6.67 L/SEC
FEV1-%PRED-PRE: 35 %
FEV1-PRE: 0.92 L
FEV1FEV6-PRE: 71 %
FEV1FEV6-PRED: 77 %
FEV1FVC-PRE: 73 %
FEV1FVC-PRED: 76 %
FEV1SVC-PRE: 64 %
FEV1SVC-PRED: 67 %
FIFMAX-PRE: 3.29 L/SEC
FRCPLETH-%PRED-PRE: 55 %
FRCPLETH-PRE: 1.97 L
FRCPLETH-PRED: 3.53 L
FVC-%PRED-PRE: 36 %
FVC-PRE: 1.25 L
FVC-PRED: 3.45 L
IC-%PRED-PRE: 37 %
IC-PRE: 1.17 L
IC-PRED: 3.09 L
RVPLETH-%PRED-PRE: 64 %
RVPLETH-PRE: 1.72 L
RVPLETH-PRED: 2.67 L
TLCPLETH-%PRED-PRE: 49 %
TLCPLETH-PRE: 3.14 L
TLCPLETH-PRED: 6.31 L
VA-%PRED-PRE: 44 %
VA-PRE: 2.7 L
VC-%PRED-PRE: 36 %
VC-PRE: 1.43 L
VC-PRED: 3.89 L

## 2017-09-12 ENCOUNTER — HOSPITAL ENCOUNTER (OUTPATIENT)
Dept: CARDIAC REHAB | Facility: CLINIC | Age: 77
End: 2017-09-12
Attending: INTERNAL MEDICINE
Payer: COMMERCIAL

## 2017-09-12 PROCEDURE — 40000244 ZZH STATISTIC VISIT PULM REHAB

## 2017-09-12 PROCEDURE — G0238 OTH RESP PROC, INDIV: HCPCS

## 2017-09-17 ENCOUNTER — MYC MEDICAL ADVICE (OUTPATIENT)
Dept: RHEUMATOLOGY | Facility: CLINIC | Age: 77
End: 2017-09-17

## 2017-09-19 ENCOUNTER — HOSPITAL ENCOUNTER (OUTPATIENT)
Dept: CARDIAC REHAB | Facility: CLINIC | Age: 77
End: 2017-09-19
Attending: INTERNAL MEDICINE
Payer: COMMERCIAL

## 2017-09-19 PROCEDURE — G0238 OTH RESP PROC, INDIV: HCPCS

## 2017-09-19 PROCEDURE — 40000244 ZZH STATISTIC VISIT PULM REHAB

## 2017-09-21 ENCOUNTER — HOSPITAL ENCOUNTER (OUTPATIENT)
Dept: CARDIAC REHAB | Facility: CLINIC | Age: 77
End: 2017-09-21
Attending: INTERNAL MEDICINE
Payer: COMMERCIAL

## 2017-09-21 PROCEDURE — 40000244 ZZH STATISTIC VISIT PULM REHAB

## 2017-09-21 PROCEDURE — G0239 OTH RESP PROC, GROUP: HCPCS

## 2017-09-26 ENCOUNTER — HOSPITAL ENCOUNTER (OUTPATIENT)
Dept: CARDIAC REHAB | Facility: CLINIC | Age: 77
End: 2017-09-26
Attending: INTERNAL MEDICINE
Payer: COMMERCIAL

## 2017-09-26 PROCEDURE — 40000244 ZZH STATISTIC VISIT PULM REHAB: Performed by: CLINICAL EXERCISE PHYSIOLOGIST

## 2017-09-26 PROCEDURE — G0239 OTH RESP PROC, GROUP: HCPCS | Performed by: CLINICAL EXERCISE PHYSIOLOGIST

## 2017-09-28 ENCOUNTER — HOSPITAL ENCOUNTER (OUTPATIENT)
Dept: CARDIAC REHAB | Facility: CLINIC | Age: 77
End: 2017-09-28
Attending: INTERNAL MEDICINE
Payer: COMMERCIAL

## 2017-09-28 PROCEDURE — 40000244 ZZH STATISTIC VISIT PULM REHAB: Performed by: CLINICAL EXERCISE PHYSIOLOGIST

## 2017-09-28 PROCEDURE — 40000116 ZZH STATISTIC OP CR VISIT: Performed by: CLINICAL EXERCISE PHYSIOLOGIST

## 2017-09-28 PROCEDURE — 93798 PHYS/QHP OP CAR RHAB W/ECG: CPT | Performed by: CLINICAL EXERCISE PHYSIOLOGIST

## 2017-09-28 PROCEDURE — G0239 OTH RESP PROC, GROUP: HCPCS | Performed by: CLINICAL EXERCISE PHYSIOLOGIST

## 2017-10-03 ENCOUNTER — HOSPITAL ENCOUNTER (OUTPATIENT)
Dept: CARDIAC REHAB | Facility: CLINIC | Age: 77
End: 2017-10-03
Attending: INTERNAL MEDICINE
Payer: COMMERCIAL

## 2017-10-03 PROCEDURE — G0239 OTH RESP PROC, GROUP: HCPCS | Performed by: CLINICAL EXERCISE PHYSIOLOGIST

## 2017-10-03 PROCEDURE — 40000244 ZZH STATISTIC VISIT PULM REHAB: Performed by: CLINICAL EXERCISE PHYSIOLOGIST

## 2017-10-05 ENCOUNTER — HOSPITAL ENCOUNTER (OUTPATIENT)
Dept: CARDIAC REHAB | Facility: CLINIC | Age: 77
End: 2017-10-05
Attending: INTERNAL MEDICINE
Payer: COMMERCIAL

## 2017-10-05 PROCEDURE — 40000244 ZZH STATISTIC VISIT PULM REHAB: Performed by: CLINICAL EXERCISE PHYSIOLOGIST

## 2017-10-05 PROCEDURE — G0239 OTH RESP PROC, GROUP: HCPCS | Performed by: CLINICAL EXERCISE PHYSIOLOGIST

## 2017-10-10 ENCOUNTER — HOSPITAL ENCOUNTER (OUTPATIENT)
Dept: CARDIAC REHAB | Facility: CLINIC | Age: 77
End: 2017-10-10
Attending: INTERNAL MEDICINE
Payer: COMMERCIAL

## 2017-10-10 PROCEDURE — 40000244 ZZH STATISTIC VISIT PULM REHAB

## 2017-10-10 PROCEDURE — G0239 OTH RESP PROC, GROUP: HCPCS

## 2017-10-11 ENCOUNTER — OFFICE VISIT (OUTPATIENT)
Dept: PULMONOLOGY | Facility: CLINIC | Age: 77
End: 2017-10-11
Attending: INTERNAL MEDICINE
Payer: COMMERCIAL

## 2017-10-11 VITALS
BODY MASS INDEX: 25.54 KG/M2 | OXYGEN SATURATION: 96 % | DIASTOLIC BLOOD PRESSURE: 86 MMHG | WEIGHT: 158.9 LBS | HEART RATE: 84 BPM | HEIGHT: 66 IN | SYSTOLIC BLOOD PRESSURE: 144 MMHG | RESPIRATION RATE: 16 BRPM

## 2017-10-11 DIAGNOSIS — J84.9 ILD (INTERSTITIAL LUNG DISEASE) (H): Primary | ICD-10-CM

## 2017-10-11 DIAGNOSIS — J84.9 ILD (INTERSTITIAL LUNG DISEASE) (H): ICD-10-CM

## 2017-10-11 PROCEDURE — 99212 OFFICE O/P EST SF 10 MIN: CPT | Mod: ZF

## 2017-10-11 ASSESSMENT — PAIN SCALES - GENERAL: PAINLEVEL: NO PAIN (0)

## 2017-10-11 NOTE — PROGRESS NOTES
[facility]   Pulmonary Clinic Follow Up Note  October 11, 2017      J Luis Wolf MRN# [id number]   Age: 77 year old YOB: 1940     Date of Consultation: October 11, 2017    Primary care provider: Mario Foster     History taken from; Patient       J Luis Wolf is a 77 year old male seen in the Pulmonary Clinic  for f/u.  Chief Complaint   Patient presents with     Interstitial Lung Disease (ILD)     Follow up on J Luis and his ILD   .          Pulmonary Problem List / Reason for follow up:   1. ILD           Assessment and Plan:     ASSESSMENT AND PLAN:  The patient is a 77-year-old gentleman with a history of rheumatoid arthritis and interstitial lung disease coming for the followup visit.  The patient is stable.      Interstitial lung disease associated with rheumatoid arthritis.  We will continue with the prednisone currently 14 mg. The patient is doing well.  We will repeat the pulmonary function test in 6 months.  The patient cannot take a flu vaccine because he had an allergic reaction on a flu vaccine.  Overall, patient is stable.  We will consider patient for our study with patients with rheumatoid arthritis and fibrotic lung disease; however, the patient is traveling in December to [place] and the patient will be there for 4 months and probably the patient is not a candidate for the study.      We explained the plan to the patient including the risks and benefits.  The patient expressed understanding and agreed with the plan.      Thank you very much for the opportunity to participate in the care of this very pleasant patient.           Return visit in 6 months      Bill Kraft M.D.         History of Present Illness / Interval History:   CHIEF COMPLAINT:  Interstitial lung disease associated with rheumatoid arthritis.      HISTORY OF PRESENT ILLNESS:  The patient is currently doing quite well.  The patient is now on pirfenidone 14 mg. The patient  cannot taper it lower than 14 mg; however, on 14 mg the patient is stable.  The patient's pulmonary function tests are slightly improved than on the previous visit and the patient is doing relatively well.  The patient denies any increase in shortness of breath.  The patient denies any increase in cough.  Overall, patient is stable.                        Pulmonary Data:         Exposure history: Asbestos;  No , TB;  No , Radiation;   No          Medications:     Current Outpatient Prescriptions   Medication Sig     Acetaminophen (TYLENOL PO) Take 325 mg by mouth as needed for mild pain or fever     alendronate (FOSAMAX) 70 MG tablet Take 1 tablet (70 mg) by mouth every 7 days     hydroxychloroquine (PLAQUENIL) 200 MG tablet Take 2 tablets (400 mg) by mouth daily     benzonatate (TESSALON) 100 MG capsule Take 1 capsule (100 mg) by mouth 3 times daily as needed for cough     predniSONE (DELTASONE) 1 MG tablet Patient takes 14 mg daily take 4 1mg tabs by mouth daily with 1 10mg tab.     predniSONE (DELTASONE) 10 MG tablet Take 1 tablet (10 mg) by mouth daily Patient takes a total dose of 13mg daily     sulfamethoxazole-trimethoprim (BACTRIM DS/SEPTRA DS) 800-160 MG per tablet Take 1 tablet by mouth Every Mon, Wed, Fri Morning 400-80     Ibuprofen (ADVIL PO) Take 200 mg by mouth     cetirizine (ZYRTEC) 10 MG tablet Take 10 mg by mouth daily     azelastine (ASTELIN) 137 MCG/SPRAY nasal spray [place] 1 spray into both nostrils 2 times daily     atorvastatin (LIPITOR) 10 MG tablet Take 5 mg by mouth every other day      Calcium Citrate-Vitamin D (SM CALCIUM CITRATE+/VIT D3 PO) Take by mouth 2 times daily 630/500iu     Cholecalciferol (D3-1000 PO) Take by mouth daily     Multiple Vitamin (DAILY MULTIVITAMIN PO) Take 2 tablets by mouth every morning     LOSARTAN POTASSIUM PO Take 25 mg by mouth daily     No current facility-administered medications for this visit.              Past Medical History:     Past Medical History:    Diagnosis Date     Rheumatoid arthritis (H)              Past Surgical History:     Past Surgical History:   Procedure Laterality Date     APPENDECTOMY       BACK SURGERY      Compression fx thoracic vertebra     COLONOSCOPY  2014     ORTHOPEDIC SURGERY  2015    Bunionectomy lt foot     SOFT TISSUE SURGERY  2015    Bursitis rt hip cortisone injection 11 15 2015     THORACIC SURGERY  2012    Pulmonary fibrosis             Social History:     Social History     Social History     Marital status:      Spouse name: N/A     Number of children: N/A     Years of education: N/A     Occupational History     Not on file.     Social History Main Topics     Smoking status: Former Smoker     Packs/day: 0.50     Years: 5.00     Types: Cigarettes     Start date: 1957     Quit date: 1962     Smokeless tobacco: Never Used     Alcohol use 0.0 oz/week     0 Standard drinks or equivalent per week      Comment: Occasionally     Drug use: Not on file     Sexual activity: Not on file     Other Topics Concern     Not on file     Social History Narrative             Family History:     Family History   Problem Relation Age of Onset     Coronary Artery Disease Father      Age 77  age 80     Hyperlipidemia Father      Hypertension Mother       age 105             Immunization:     There is no immunization history on file for this patient.           Review of Systems:   I have done 10 points of review systems and pertinent findings are as above, otherwise negative.           Physical Examination:   General: Alert, oriented, not in distress  B/P: 144/86, T: Data Unavailable, P: 84, R: 16  HEENT: neck supple, symmetrical, no lymph node enlargement, thyromegaly, bruit, JVD, pupils are isocoric and equally responsive to the light.   Lungs: both hemithoraces are symmetrical, normal to palpation, no dullness to percussion, auscultation of lungs revealed bibasilar crackles  CVS: Normal S1 and S2, no additional heart  sounds, murmur, rub, normal peripheral pulses  Abdomen: Bowel sounds present, soft, non tender, non distended, no organomegaly, ascitis, mass  Extremities/musculoskeletal: no peripheral edema, deformity, cyanosis, clubbing  Neurology: alert, orientedx3, no motor/sensorial deficits, cranial nerves grossly normal  Skin: no rash  Psychiatry: Mood and affect are appropriate             DATA:     CBC RESULTS:     Recent Labs   Lab Test  07/11/17   0829  09/30/16   1421   WBC  11.1*  11.1*   RBC  4.54  4.58   HGB  13.4  13.6   HCT  44.3  43.8   PLT  200  203       Basic Metabolic Panel:  Recent Labs   Lab Test  07/11/17   0829  09/30/16   1421   NA  139  137   POTASSIUM  3.9  4.2   CHLORIDE  99  98   CO2  38*  36*   BUN  20  22   CR  1.07  0.86   GLC  93  146*   SILVINO  9.4  9.2       INRNo results for input(s): INR in the last 86710 hours.     PFT   PFT Latest Ref Rng & Units 10/11/2017   FVC L 1.33   FEV1 L 0.99   FVC% % 38   FEV1% % 38               Thank you for allowing me participate in the care of J Luis Wolf.    Bill Kraft M.D.  Associate Professor of Medicine  Pulmonary, Allergy, Critical Care and Sleep

## 2017-10-11 NOTE — MR AVS SNAPSHOT
After Visit Summary   10/11/2017    J Luis Wolf    MRN: [id number]           Patient Information     Date Of Birth          1940        Visit Information        Provider Department      10/11/2017 10:30 AM Bill Kraft MD [facility]        Today's Diagnoses     ILD (interstitial lung disease) (H)    -  1      Care Instructions    Patient is instructed to call if there is any changes in symptoms.          Follow-ups after your visit        Follow-up notes from your care team     Return in about 6 months (around 4/11/2018).      Your next 10 appointments already scheduled     Oct 12, 2017 12:00 PM CDT   Pulmonary Treatment with Sh Pulmonary Rehab 2   [facility] ([facility])    [address]  [place] [address]   [phone]            Oct 17, 2017 12:00 PM CDT   Pulmonary Treatment with Sh Pulmonary Rehab 2   [facility])    [address]  [place] [address]   [phone]            Oct 26, 2017 12:00 PM CDT   Pulmonary Treatment with Sh Pulmonary Rehab 2   [facility])    [address]  [place] [address]   [phone]            Oct 31, 2017 12:00 PM CDT   Pulmonary Treatment with Sh Pulmonary Rehab 2   [facility] ([facility])    [address]  [place] [address]   [phone]            Nov 02, 2017 12:00 PM CDT   Pulmonary Treatment with Sh Pulmonary Rehab 2   [facility] ([facility])    [address]  [facility] [address]   [phone]            Nov 07, 2017 12:00 PM CST   Pulmonary Treatment with Sh Pulmonary Rehab 2   [facility] ([facility])    [address]  [facility] [address]   [phone]             Nov 09, 2017 12:00 PM CST   Pulmonary Treatment with Sh Pulmonary Rehab 2   [facility] ([facility])    [address]  [place] [address]   [phone]            Nov 14, 2017 12:00 PM CST   Pulmonary Treatment with Sh Pulmonary Rehab 2   [facility] ([facility])    [address]  [place] [address]   [phone]            Nov 16, 2017 12:00 PM CST   Pulmonary Treatment with Sh Pulmonary Rehab 2   [facility] ([facility])    [address]  [place] [address]   [phone]            Nov 21, 2017  2:00 PM CST   Pulmonary Treatment with Sh Pulmonary Rehab 2   [facility] ([facility])    [address]  [place] [address]   [phone]              Future tests that were ordered for you today     Open Future Orders        Priority Expected Expires Ordered    General PFT Lab (Please always keep checked) Routine  10/11/2018 10/11/2017    Pulmonary Function Test Routine  12/28/2026 10/11/2017            Who to contact     If you have questions or need follow up information about today's clinic visit or your schedule please contact [facility] directly at [phone].  Normal or non-critical lab and imaging results will be communicated to you by Sunseahart, letter or phone within 4 business days after the clinic has received the results. If you do not hear from us within 7 days, please contact the clinic through Sunseahart or phone. If you have a critical or abnormal lab result, we will notify you by phone as soon as possible.  Submit refill requests through MIOX or call your pharmacy and they will forward the refill request to us. Please allow 3 business days for your refill to be completed.          Additional Information About Your Visit        [facility]  "Information     Dharmesh gives you secure access to your electronic health record. If you see a primary care provider, you can also send messages to your care team and make appointments. If you have questions, please call your primary care clinic.  If you do not have a primary care provider, please call [phone] and they will assist you.        Care EveryWhere ID     This is your Care EveryWhere ID. This could be used by other organizations to access your [facility] medical records  [id number]        Your Vitals Were     Pulse Respirations Height Pulse Oximetry BMI (Body Mass Index)       84 16 1.676 m (5' 6\") 96% 25.65 kg/m2        Blood Pressure from Last 3 Encounters:   10/11/17 144/86   07/12/17 126/75   07/11/17 118/73    Weight from Last 3 Encounters:   10/11/17 72.1 kg (158 lb 14.4 oz)   07/12/17 71.5 kg (157 lb 11.2 oz)   07/11/17 71.2 kg (156 lb 14.4 oz)               Primary Care Provider Office Phone # Fax #    Mario Foster [phone]       [facility] [place] [address]  [place] [address]        Equal Access to Services     HARPREET MARCANO AH: Hadii sendy ku hadasho Soomaali, waaxda luqadaha, qaybta kaalmada adeegyada, jose alberto silvan jayda bullock . So [facility] [phone].    ATENCIÓN: Si habla español, tiene a rivas disposición servicios gratuitos de asistencia lingüística. [facility] [phone].    We comply with applicable federal civil rights laws and [place] laws. We do not discriminate on the basis of race, color, national origin, age, disability, sex, sexual orientation, or gender identity.            Thank you!     Thank you for choosing [facility]  for your care. Our goal is always to provide you with excellent care. Hearing back from our patients is one way we can continue to improve our services. Please take a few minutes to complete the written survey that you may receive in the mail after your visit with us. Thank you!           "   Your Updated Medication List - Protect others around you: Learn how to safely use, store and throw away your medicines at www.disposemymeds.org.          This list is accurate as of: 10/11/17 11:09 AM.  Always use your most recent med list.                   Brand Name Dispense Instructions for use Diagnosis    ADVIL PO      Take 200 mg by mouth        alendronate 70 MG tablet    FOSAMAX    12 tablet    Take 1 tablet (70 mg) by mouth every 7 days    ILD (interstitial lung disease) (H), Long-term use of immunosuppressant medication, Rheumatoid arthritis involving multiple sites with positive rheumatoid factor (H)       atorvastatin 10 MG tablet    LIPITOR     Take 5 mg by mouth every other day        azelastine 0.1 % spray    ASTELIN     Spray 1 spray into both nostrils 2 times daily        benzonatate 100 MG capsule    TESSALON    180 capsule    Take 1 capsule (100 mg) by mouth 3 times daily as needed for cough    ILD (interstitial lung disease) (H)       cetirizine 10 MG tablet    zyrTEC     Take 10 mg by mouth daily        D3-1000 PO      Take by mouth daily        DAILY MULTIVITAMIN PO      Take 2 tablets by mouth every morning        hydroxychloroquine 200 MG tablet    PLAQUENIL    180 tablet    Take 2 tablets (400 mg) by mouth daily    ILD (interstitial lung disease) (H)       LOSARTAN POTASSIUM PO      Take 25 mg by mouth daily        * predniSONE 1 MG tablet    DELTASONE    360 tablet    Patient takes 14 mg daily take 4 1mg tabs by mouth daily with 1 10mg tab.    ILD (interstitial lung disease) (H)       * predniSONE 10 MG tablet    DELTASONE    90 tablet    Take 1 tablet (10 mg) by mouth daily Patient takes a total dose of 13mg daily    ILD (interstitial lung disease) (H)       SM CALCIUM CITRATE+/VIT D3 PO      Take by mouth 2 times daily 630/500iu        sulfamethoxazole-trimethoprim 800-160 MG per tablet    BACTRIM DS/SEPTRA DS    36 tablet    Take 1 tablet by mouth Every Mon, Wed, Fri Morning 400-80     ILD (interstitial lung disease) (H)       TYLENOL PO      Take 325 mg by mouth as needed for mild pain or fever    ILD (interstitial lung disease) (H), Long-term use of immunosuppressant medication, Rheumatoid arthritis involving multiple sites with positive rheumatoid factor (H)       * Notice:  This list has 2 medication(s) that are the same as other medications prescribed for you. Read the directions carefully, and ask your doctor or other care provider to review them with you.

## 2017-10-11 NOTE — LETTER
10/11/2017       RE: J Luis Wolf  [address] [facility] [address]     Dear Colleague,    Thank you for referring your patient, J Luis Wolf, to the [facility] at [facility]. Please see a copy of my visit note below.        [facility]   Pulmonary Clinic Follow Up Note  October 11, 2017      J Luis Wolf MRN# [id number]   Age: 77 year old YOB: 1940     Date of Consultation: October 11, 2017    Primary care provider: Mario Foster     History taken from; Patient       J Luis Wolf is a 77 year old male seen in the Pulmonary Clinic  for f/u.  Chief Complaint   Patient presents with     Interstitial Lung Disease (ILD)     Follow up on J Luis and his ILD   .          Pulmonary Problem List / Reason for follow up:   1. ILD           Assessment and Plan:     ASSESSMENT AND PLAN:  The patient is a 77-year-old gentleman with a history of rheumatoid arthritis and interstitial lung disease coming for the followup visit.  The patient is stable.      Interstitial lung disease associated with rheumatoid arthritis.  We will continue with the prednisone currently 14 mg. The patient is doing well.  We will repeat the pulmonary function test in 6 months.  The patient cannot take a flu vaccine because he had an allergic reaction on a flu vaccine.  Overall, patient is stable.  We will consider patient for our study with patients with rheumatoid arthritis and fibrotic lung disease; however, the patient is traveling in December to [place] and the patient will be there for 4 months and probably the patient is not a candidate for the study.      We explained the plan to the patient including the risks and benefits.  The patient expressed understanding and agreed with the plan.      Thank you very much for the opportunity to participate in the care of this very pleasant patient.           Return  visit in 6 months      Bill Kraft M.D.         History of Present Illness / Interval History:   CHIEF COMPLAINT:  Interstitial lung disease associated with rheumatoid arthritis.      HISTORY OF PRESENT ILLNESS:  The patient is currently doing quite well.  The patient is now on pirfenidone 14 mg. The patient cannot taper it lower than 14 mg; however, on 14 mg the patient is stable.  The patient's pulmonary function tests are slightly improved than on the previous visit and the patient is doing relatively well.  The patient denies any increase in shortness of breath.  The patient denies any increase in cough.  Overall, patient is stable.                        Pulmonary Data:         Exposure history: Asbestos;  No , TB;  No , Radiation;   No          Medications:     Current Outpatient Prescriptions   Medication Sig     Acetaminophen (TYLENOL PO) Take 325 mg by mouth as needed for mild pain or fever     alendronate (FOSAMAX) 70 MG tablet Take 1 tablet (70 mg) by mouth every 7 days     hydroxychloroquine (PLAQUENIL) 200 MG tablet Take 2 tablets (400 mg) by mouth daily     benzonatate (TESSALON) 100 MG capsule Take 1 capsule (100 mg) by mouth 3 times daily as needed for cough     predniSONE (DELTASONE) 1 MG tablet Patient takes 14 mg daily take 4 1mg tabs by mouth daily with 1 10mg tab.     predniSONE (DELTASONE) 10 MG tablet Take 1 tablet (10 mg) by mouth daily Patient takes a total dose of 13mg daily     sulfamethoxazole-trimethoprim (BACTRIM DS/SEPTRA DS) 800-160 MG per tablet Take 1 tablet by mouth Every Mon, Wed, Fri Morning 400-80     Ibuprofen (ADVIL PO) Take 200 mg by mouth     cetirizine (ZYRTEC) 10 MG tablet Take 10 mg by mouth daily     azelastine (ASTELIN) 137 MCG/SPRAY nasal spray [place] 1 spray into both nostrils 2 times daily     atorvastatin (LIPITOR) 10 MG tablet Take 5 mg by mouth every other day      Calcium Citrate-Vitamin D (SM CALCIUM CITRATE+/VIT D3 PO) Take by mouth 2 times daily 630/500iu      Cholecalciferol (D3-1000 PO) Take by mouth daily     Multiple Vitamin (DAILY MULTIVITAMIN PO) Take 2 tablets by mouth every morning     LOSARTAN POTASSIUM PO Take 25 mg by mouth daily     No current facility-administered medications for this visit.              Past Medical History:     Past Medical History:   Diagnosis Date     Rheumatoid arthritis (H)              Past Surgical History:     Past Surgical History:   Procedure Laterality Date     APPENDECTOMY       BACK SURGERY      Compression fx thoracic vertebra     COLONOSCOPY  2014     ORTHOPEDIC SURGERY  2015    Bunionectomy lt foot     SOFT TISSUE SURGERY  2015    Bursitis rt hip cortisone injection 11 15 2015     THORACIC SURGERY  2012    Pulmonary fibrosis             Social History:     Social History     Social History     Marital status:      Spouse name: N/A     Number of children: N/A     Years of education: N/A     Occupational History     Not on file.     Social History Main Topics     Smoking status: Former Smoker     Packs/day: 0.50     Years: 5.00     Types: Cigarettes     Start date: 1957     Quit date: 1962     Smokeless tobacco: Never Used     Alcohol use 0.0 oz/week     0 Standard drinks or equivalent per week      Comment: Occasionally     Drug use: Not on file     Sexual activity: Not on file     Other Topics Concern     Not on file     Social History Narrative             Family History:     Family History   Problem Relation Age of Onset     Coronary Artery Disease Father      Age 77  age 80     Hyperlipidemia Father      Hypertension Mother       age 105             Immunization:     There is no immunization history on file for this patient.           Review of Systems:   I have done 10 points of review systems and pertinent findings are as above, otherwise negative.           Physical Examination:   General: Alert, oriented, not in distress  B/P: 144/86, T: Data Unavailable, P: 84, R: 16  HEENT: neck supple,  symmetrical, no lymph node enlargement, thyromegaly, bruit, JVD, pupils are isocoric and equally responsive to the light.   Lungs: both hemithoraces are symmetrical, normal to palpation, no dullness to percussion, auscultation of lungs revealed bibasilar crackles  CVS: Normal S1 and S2, no additional heart sounds, murmur, rub, normal peripheral pulses  Abdomen: Bowel sounds present, soft, non tender, non distended, no organomegaly, ascitis, mass  Extremities/musculoskeletal: no peripheral edema, deformity, cyanosis, clubbing  Neurology: alert, orientedx3, no motor/sensorial deficits, cranial nerves grossly normal  Skin: no rash  Psychiatry: Mood and affect are appropriate             DATA:     CBC RESULTS:     Recent Labs   Lab Test  07/11/17   0829  09/30/16   1421   WBC  11.1*  11.1*   RBC  4.54  4.58   HGB  13.4  13.6   HCT  44.3  43.8   PLT  200  203       Basic Metabolic Panel:  Recent Labs   Lab Test  07/11/17   0829  09/30/16   1421   NA  139  137   POTASSIUM  3.9  4.2   CHLORIDE  99  98   CO2  38*  36*   BUN  20  22   CR  1.07  0.86   GLC  93  146*   SILVINO  9.4  9.2       INRNo results for input(s): INR in the last 49929 hours.     PFT   PFT Latest Ref Rng & Units 10/11/2017   FVC L 1.33   FEV1 L 0.99   FVC% % 38   FEV1% % 38               Thank you for allowing me participate in the care of J Luis Wolf.    Bill Kraft M.D.  Associate Professor of Medicine  Pulmonary, Allergy, Critical Care and Sleep

## 2017-10-11 NOTE — NURSING NOTE
Chief Complaint   Patient presents with     Interstitial Lung Disease (ILD)     Follow up on J Luis and his ILD     Toni Lackey CMA at 10:24 AM on 10/11/2017

## 2017-10-12 ENCOUNTER — HOSPITAL ENCOUNTER (OUTPATIENT)
Dept: CARDIAC REHAB | Facility: CLINIC | Age: 77
End: 2017-10-12
Attending: INTERNAL MEDICINE
Payer: COMMERCIAL

## 2017-10-12 PROCEDURE — 40000244 ZZH STATISTIC VISIT PULM REHAB

## 2017-10-12 PROCEDURE — G0239 OTH RESP PROC, GROUP: HCPCS

## 2017-10-16 ENCOUNTER — MEDICAL CORRESPONDENCE (OUTPATIENT)
Dept: HEALTH INFORMATION MANAGEMENT | Facility: CLINIC | Age: 77
End: 2017-10-16

## 2017-10-16 ENCOUNTER — TRANSFERRED RECORDS (OUTPATIENT)
Dept: HEALTH INFORMATION MANAGEMENT | Facility: CLINIC | Age: 77
End: 2017-10-16

## 2017-10-17 ENCOUNTER — HOSPITAL ENCOUNTER (OUTPATIENT)
Dept: CARDIAC REHAB | Facility: CLINIC | Age: 77
End: 2017-10-17
Attending: INTERNAL MEDICINE
Payer: COMMERCIAL

## 2017-10-17 PROCEDURE — G0239 OTH RESP PROC, GROUP: HCPCS | Performed by: CLINICAL EXERCISE PHYSIOLOGIST

## 2017-10-17 PROCEDURE — 40000244 ZZH STATISTIC VISIT PULM REHAB: Performed by: CLINICAL EXERCISE PHYSIOLOGIST

## 2017-10-24 LAB
DLCOUNC-%PRED-PRE: 45 %
DLCOUNC-PRE: 10.33 ML/MIN/MMHG
DLCOUNC-PRED: 22.71 ML/MIN/MMHG
ERV-%PRED-PRE: 38 %
ERV-PRE: 0.3 L
ERV-PRED: 0.78 L
EXPTIME-PRE: 7.4 SEC
FEF2575-%PRED-PRE: 35 %
FEF2575-PRE: 0.67 L/SEC
FEF2575-PRED: 1.92 L/SEC
FEFMAX-%PRED-PRE: 66 %
FEFMAX-PRE: 4.39 L/SEC
FEFMAX-PRED: 6.64 L/SEC
FEV1-%PRED-PRE: 38 %
FEV1-PRE: 0.99 L
FEV1FEV6-PRE: 75 %
FEV1FEV6-PRED: 77 %
FEV1FVC-PRE: 75 %
FEV1FVC-PRED: 76 %
FEV1SVC-PRE: 76 %
FEV1SVC-PRED: 67 %
FIFMAX-PRE: 3.33 L/SEC
FRCPLETH-%PRED-PRE: 57 %
FRCPLETH-PRE: 2.02 L
FRCPLETH-PRED: 3.53 L
FVC-%PRED-PRE: 38 %
FVC-PRE: 1.33 L
FVC-PRED: 3.44 L
IC-%PRED-PRE: 32 %
IC-PRE: 1 L
IC-PRED: 3.1 L
RVPLETH-%PRED-PRE: 64 %
RVPLETH-PRE: 1.72 L
RVPLETH-PRED: 2.67 L
TLCPLETH-%PRED-PRE: 47 %
TLCPLETH-PRE: 3.02 L
TLCPLETH-PRED: 6.31 L
VA-%PRED-PRE: 40 %
VA-PRE: 2.45 L
VC-%PRED-PRE: 33 %
VC-PRE: 1.3 L
VC-PRED: 3.88 L

## 2017-10-26 ENCOUNTER — HOSPITAL ENCOUNTER (OUTPATIENT)
Dept: CARDIAC REHAB | Facility: CLINIC | Age: 77
End: 2017-10-26
Attending: INTERNAL MEDICINE
Payer: COMMERCIAL

## 2017-10-26 PROCEDURE — 40000244 ZZH STATISTIC VISIT PULM REHAB

## 2017-10-26 PROCEDURE — G0239 OTH RESP PROC, GROUP: HCPCS

## 2017-10-31 ENCOUNTER — HOSPITAL ENCOUNTER (OUTPATIENT)
Dept: CARDIAC REHAB | Facility: CLINIC | Age: 77
End: 2017-10-31
Attending: INTERNAL MEDICINE
Payer: COMMERCIAL

## 2017-10-31 PROCEDURE — 40000244 ZZH STATISTIC VISIT PULM REHAB

## 2017-10-31 PROCEDURE — G0239 OTH RESP PROC, GROUP: HCPCS

## 2017-11-02 ENCOUNTER — HOSPITAL ENCOUNTER (OUTPATIENT)
Dept: CARDIAC REHAB | Facility: CLINIC | Age: 77
End: 2017-11-02
Attending: INTERNAL MEDICINE
Payer: COMMERCIAL

## 2017-11-02 PROCEDURE — 40000244 ZZH STATISTIC VISIT PULM REHAB

## 2017-11-02 PROCEDURE — G0239 OTH RESP PROC, GROUP: HCPCS

## 2017-11-07 ENCOUNTER — HOSPITAL ENCOUNTER (OUTPATIENT)
Dept: CARDIAC REHAB | Facility: CLINIC | Age: 77
End: 2017-11-07
Attending: INTERNAL MEDICINE
Payer: COMMERCIAL

## 2017-11-07 PROCEDURE — 40000244 ZZH STATISTIC VISIT PULM REHAB: Performed by: CLINICAL EXERCISE PHYSIOLOGIST

## 2017-11-07 PROCEDURE — G0239 OTH RESP PROC, GROUP: HCPCS | Performed by: CLINICAL EXERCISE PHYSIOLOGIST

## 2017-11-16 ENCOUNTER — HOSPITAL ENCOUNTER (OUTPATIENT)
Dept: CARDIAC REHAB | Facility: CLINIC | Age: 77
End: 2017-11-16
Attending: INTERNAL MEDICINE
Payer: COMMERCIAL

## 2017-11-16 PROCEDURE — G0239 OTH RESP PROC, GROUP: HCPCS

## 2017-11-16 PROCEDURE — 40000244 ZZH STATISTIC VISIT PULM REHAB

## 2017-11-21 ENCOUNTER — HOSPITAL ENCOUNTER (OUTPATIENT)
Dept: CARDIAC REHAB | Facility: CLINIC | Age: 77
End: 2017-11-21
Attending: INTERNAL MEDICINE
Payer: COMMERCIAL

## 2017-11-21 ENCOUNTER — OFFICE VISIT (OUTPATIENT)
Dept: RHEUMATOLOGY | Facility: CLINIC | Age: 77
End: 2017-11-21
Attending: INTERNAL MEDICINE
Payer: COMMERCIAL

## 2017-11-21 VITALS
SYSTOLIC BLOOD PRESSURE: 138 MMHG | WEIGHT: 155.6 LBS | HEART RATE: 65 BPM | OXYGEN SATURATION: 96 % | DIASTOLIC BLOOD PRESSURE: 81 MMHG | BODY MASS INDEX: 25.01 KG/M2 | HEIGHT: 66 IN

## 2017-11-21 DIAGNOSIS — Z79.60 LONG-TERM USE OF IMMUNOSUPPRESSANT MEDICATION: ICD-10-CM

## 2017-11-21 DIAGNOSIS — M05.79 RHEUMATOID ARTHRITIS INVOLVING MULTIPLE SITES WITH POSITIVE RHEUMATOID FACTOR (H): Primary | ICD-10-CM

## 2017-11-21 DIAGNOSIS — J84.9 ILD (INTERSTITIAL LUNG DISEASE) (H): ICD-10-CM

## 2017-11-21 DIAGNOSIS — M05.79 RHEUMATOID ARTHRITIS INVOLVING MULTIPLE SITES WITH POSITIVE RHEUMATOID FACTOR (H): ICD-10-CM

## 2017-11-21 LAB
ALBUMIN SERPL-MCNC: 3.6 G/DL (ref 3.4–5)
ALT SERPL W P-5'-P-CCNC: 42 U/L (ref 0–70)
AST SERPL W P-5'-P-CCNC: 23 U/L (ref 0–45)
BASOPHILS # BLD AUTO: 0.1 10E9/L (ref 0–0.2)
BASOPHILS NFR BLD AUTO: 0.6 %
CREAT SERPL-MCNC: 1 MG/DL (ref 0.66–1.25)
CRP SERPL-MCNC: 2.9 MG/L (ref 0–8)
DIFFERENTIAL METHOD BLD: ABNORMAL
EOSINOPHIL # BLD AUTO: 0.2 10E9/L (ref 0–0.7)
EOSINOPHIL NFR BLD AUTO: 1.5 %
ERYTHROCYTE [DISTWIDTH] IN BLOOD BY AUTOMATED COUNT: 14.1 % (ref 10–15)
ERYTHROCYTE [SEDIMENTATION RATE] IN BLOOD BY WESTERGREN METHOD: 17 MM/H (ref 0–20)
GFR SERPL CREATININE-BSD FRML MDRD: 73 ML/MIN/1.7M2
HCT VFR BLD AUTO: 46.2 % (ref 40–53)
HGB BLD-MCNC: 14 G/DL (ref 13.3–17.7)
IMM GRANULOCYTES # BLD: 0.1 10E9/L (ref 0–0.4)
IMM GRANULOCYTES NFR BLD: 0.6 %
LYMPHOCYTES # BLD AUTO: 1.4 10E9/L (ref 0.8–5.3)
LYMPHOCYTES NFR BLD AUTO: 11.4 %
MCH RBC QN AUTO: 29.8 PG (ref 26.5–33)
MCHC RBC AUTO-ENTMCNC: 30.3 G/DL (ref 31.5–36.5)
MCV RBC AUTO: 98 FL (ref 78–100)
MONOCYTES # BLD AUTO: 0.7 10E9/L (ref 0–1.3)
MONOCYTES NFR BLD AUTO: 5.9 %
NEUTROPHILS # BLD AUTO: 9.9 10E9/L (ref 1.6–8.3)
NEUTROPHILS NFR BLD AUTO: 80 %
NRBC # BLD AUTO: 0 10*3/UL
NRBC BLD AUTO-RTO: 0 /100
PLATELET # BLD AUTO: 209 10E9/L (ref 150–450)
RBC # BLD AUTO: 4.7 10E12/L (ref 4.4–5.9)
WBC # BLD AUTO: 12.3 10E9/L (ref 4–11)

## 2017-11-21 PROCEDURE — 40000244 ZZH STATISTIC VISIT PULM REHAB

## 2017-11-21 PROCEDURE — 86140 C-REACTIVE PROTEIN: CPT | Performed by: INTERNAL MEDICINE

## 2017-11-21 PROCEDURE — 99212 OFFICE O/P EST SF 10 MIN: CPT | Mod: ZF

## 2017-11-21 PROCEDURE — 84460 ALANINE AMINO (ALT) (SGPT): CPT | Performed by: INTERNAL MEDICINE

## 2017-11-21 PROCEDURE — 36415 COLL VENOUS BLD VENIPUNCTURE: CPT | Performed by: INTERNAL MEDICINE

## 2017-11-21 PROCEDURE — 82040 ASSAY OF SERUM ALBUMIN: CPT | Performed by: INTERNAL MEDICINE

## 2017-11-21 PROCEDURE — 84450 TRANSFERASE (AST) (SGOT): CPT | Performed by: INTERNAL MEDICINE

## 2017-11-21 PROCEDURE — G0239 OTH RESP PROC, GROUP: HCPCS

## 2017-11-21 PROCEDURE — 82565 ASSAY OF CREATININE: CPT | Performed by: INTERNAL MEDICINE

## 2017-11-21 PROCEDURE — 85652 RBC SED RATE AUTOMATED: CPT | Performed by: INTERNAL MEDICINE

## 2017-11-21 PROCEDURE — 85025 COMPLETE CBC W/AUTO DIFF WBC: CPT | Performed by: INTERNAL MEDICINE

## 2017-11-21 RX ORDER — ALENDRONATE SODIUM 70 MG/1
70 TABLET ORAL
Qty: 12 TABLET | Refills: 3 | Status: SHIPPED | OUTPATIENT
Start: 2017-11-21 | End: 2018-04-24

## 2017-11-21 RX ORDER — HYDROXYCHLOROQUINE SULFATE 200 MG/1
400 TABLET, FILM COATED ORAL DAILY
Qty: 180 TABLET | Refills: 3 | Status: SHIPPED | OUTPATIENT
Start: 2017-11-21 | End: 2018-04-24

## 2017-11-21 ASSESSMENT — PAIN SCALES - GENERAL: PAINLEVEL: MODERATE PAIN (4)

## 2017-11-21 NOTE — NURSING NOTE
"Chief Complaint   Patient presents with     RECHECK     Follow up for Interstitial lung disease        Initial /81  Pulse 65  Ht 1.676 m (5' 6\")  Wt 70.6 kg (155 lb 9.6 oz)  SpO2 96%  BMI 25.11 kg/m2 Estimated body mass index is 25.11 kg/(m^2) as calculated from the following:    Height as of this encounter: 1.676 m (5' 6\").    Weight as of this encounter: 70.6 kg (155 lb 9.6 oz).  Medication Reconciliation: complete   Leanna Marmolejo CMA    "

## 2017-11-21 NOTE — PROGRESS NOTES
returns for management of RA complicated by ILD. +CCP, +RF, -GIANCARLO. No history of erosions. FVC 39%/DLCO 58% . Previously failed methotrexate, azathioprine, and mycophenolate. Prednisone dependent.    His breathing testing remains stable. His O2 requirement is stable. No chest pains or palpitations.     He denies important joint pains and his joint function remains excellent. No new joint deformities, but his fingers are stably deformed.    Prednisone 14 mg daily to control lung progression. Hydroxychlorquine 400 mg daily with normal eye check every 6 months. Tylenol 500 mg and Advil 200 mg about QID, to treat radicular leg pain. Alendronate 70 mg once weekly is well tolerated.    PMI:  Medical-RA with ILD, atopic rhinitis, hip bursitis and gluteal tendinopathy, osteoporosis with T4-5 vertebral fractures (T = -2.9 ), hyperlipidemia, right rotator cuff disease, DDD  Surgical-appendectomy, bunion surgery and toe surgical straightening, right elbow surgery  Injury-right elbow laceration, right knee injury, low back injury    SH:  Retired ,  with two children. Former smoker (5 years with occasional cigarette use), 1 EtOH per day, no illicit drug use. Silica and Right handed.    FH:  Mother lived to very old age, now dead  Father dead at 80 with heart disease and OA  Brother with celiac disease  Children and grandchildren are healthy    PMSF history personally reviewed by me today.    ROS:  +low back pain with radiation to the legs.  +easy brusing  +seasonal allergy  Remainder of the 14 point ROS obtained and found negative.    Physical Exam:  Constitutional: WD-WN-WG cooperative  Eyes: nl EOM, PERRLA, vision, conjunctiva, sclera  ENT: nl external ears, nose, hearing, lips, teeth, gums, throat  Neck: no mass or thyroid enlargement  Resp: +lungs with bibasilar rales; nl to palpation, nl effort  CV: irregularly irregular rhythm, no murmurs, rubs or gallops, no edema  GI: no ABD  mass or tenderness, no HSM  : not tested  Lymph: no cervical or epitrochlear nodes  MS: +heberden's nodes with diffuse angular deformities; bilateral left>right wrist reduced extension and 1st CMC squaring;  +right shoulder with only minimal 30 degrees abduction and limited rotation; neck rotation 45 degrees bilaterally and no extension; ; All other TMJ, neck, shoulder, elbow, wrist, hand, spine, hip, knee, ankle, and foot joints were examined and otherwise found normal. Normal  strength. Intact tuck. Fairly intact prayer.  Skin: no nail pitting, alopecia, rash  Neuro: nl cranial nerves, strength, sensation, no DTRs.   Psych: nl judgement, orientation, memory, affect.    Laboratory:    Component      Latest Ref Rng & Units 7/11/2017   WBC      4.0 - 11.0 10e9/L 11.1 (H)   RBC Count      4.4 - 5.9 10e12/L 4.54   Hemoglobin      13.3 - 17.7 g/dL 13.4   Hematocrit      40.0 - 53.0 % 44.3   MCV      78 - 100 fl 98   MCH      26.5 - 33.0 pg 29.5   MCHC      31.5 - 36.5 g/dL 30.2 (L)   RDW      10.0 - 15.0 % 13.9   Platelet Count      150 - 450 10e9/L 200   Diff Method       Automated Method   % Neutrophils      % 69.7   % Lymphocytes      % 18.1   % Monocytes      % 8.1   % Eosinophils      % 2.9   % Basophils      % 0.6   % Immature Granulocytes      % 0.6   Nucleated RBCs      0 /100 0   Absolute Neutrophil      1.6 - 8.3 10e9/L 7.8   Absolute Lymphocytes      0.8 - 5.3 10e9/L 2.0   Absolute Monocytes      0.0 - 1.3 10e9/L 0.9   Absolute Eosinophils      0.0 - 0.7 10e9/L 0.3   Absolute Basophils      0.0 - 0.2 10e9/L 0.1   Abs Immature Granulocytes      0 - 0.4 10e9/L 0.1   Absolute Nucleated RBC       0.0   Sodium      133 - 144 mmol/L 139   Potassium      3.4 - 5.3 mmol/L 3.9   Chloride      94 - 109 mmol/L 99   Carbon Dioxide      20 - 32 mmol/L 38 (H)   Anion Gap      3 - 14 mmol/L 2 (L)   Glucose      70 - 99 mg/dL 93   Urea Nitrogen      7 - 30 mg/dL 20   Creatinine      0.66 - 1.25 mg/dL 1.07   GFR  Estimate      >60 mL/min/1.7m2 67   GFR Estimate If Black      >60 mL/min/1.7m2 81   Calcium      8.5 - 10.1 mg/dL 9.4   Bilirubin Total      0.2 - 1.3 mg/dL 0.3   Albumin      3.4 - 5.0 g/dL 3.4   Protein Total      6.8 - 8.8 g/dL 7.1   Alkaline Phosphatase      40 - 150 U/L 54   ALT      0 - 70 U/L 31   AST      0 - 45 U/L 18   Color Urine       Yellow   Appearance Urine       Slightly Cloudy   Glucose Urine      NEG mg/dL Negative   Bilirubin Urine      NEG Negative   Ketones Urine      NEG mg/dL 5 (A)   Specific Gravity Urine      1.003 - 1.035 1.025   Blood Urine      NEG Negative   pH Urine      5.0 - 7.0 pH 5.0   Protein Albumin Urine      NEG mg/dL 30 (A)   Urobilinogen mg/dL      0.0 - 2.0 mg/dL 2.0   Nitrite Urine      NEG Negative   Leukocyte Esterase Urine      NEG Negative   Source       Midstream Urine   WBC Urine      0 - 2 /HPF 1   RBC Urine      0 - 2 /HPF 1   Mucous Urine      NEG /LPF Present (A)   Hyaline Casts      0 - 2 /LPF 1   CRP Inflammation      0.0 - 8.0 mg/L 3.1   Sed Rate      0 - 20 mm/h 17     Impression:    Seropositive Rheumatoid Arthritis-with no evidence of active synovitis and stable joint disease. Lung function appears stable at present. Good tolerance of the medications, and we will continue with this manangement.    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    Bone health-with longstanding prednisone use. He is now on active management with fosamax and tolerates this well. He describes some concern over long term fosamax use and risk of atypical fracture. We agree to repeat the DEXA in 1 year and consider the fosamax at that time.    Irregular heart rhythm-his primary care provider is aware and will follow up.    Long term management of immunosuppression-I will get lab toxicity screening today. Continue the twice yearly eye checks.    Plan RTC in 6 months.

## 2017-11-21 NOTE — LETTER
11/21/2017      RE: J Luis Wolf  [address] [facility] [address]        returns for management of RA complicated by ILD. +CCP, +RF, -GIANCARLO. No history of erosions. FVC 39%/DLCO 58% . Previously failed methotrexate, azathioprine, and mycophenolate. Prednisone dependent.    His breathing testing remains stable. His O2 requirement is stable. No chest pains or palpitations.     He denies important joint pains and his joint function remains excellent. No new joint deformities, but his fingers are stably deformed.    Prednisone 14 mg daily to control lung progression. Hydroxychlorquine 400 mg daily with normal eye check every 6 months. Tylenol 500 mg and Advil 200 mg about QID, to treat radicular leg pain. Alendronate 70 mg once weekly is well tolerated.    PMI:  Medical-RA with ILD, atopic rhinitis, hip bursitis and gluteal tendinopathy, osteoporosis with T4-5 vertebral fractures (T = -2.9 ), hyperlipidemia, right rotator cuff disease, DDD  Surgical-appendectomy, bunion surgery and toe surgical straightening, right elbow surgery  Injury-right elbow laceration, right knee injury, low back injury    SH:  Retired ,  with two children. Former smoker (5 years with occasional cigarette use), 1 EtOH per day, no illicit drug use. Silica and Right handed.    FH:  Mother lived to very old age, now dead  Father dead at 80 with heart disease and OA  Brother with celiac disease  Children and grandchildren are healthy    PMSF history personally reviewed by me today.    ROS:  +low back pain with radiation to the legs.  +easy brusing  +seasonal allergy  Remainder of the 14 point ROS obtained and found negative.    Physical Exam:  Constitutional: WD-WN-WG cooperative  Eyes: nl EOM, PERRLA, vision, conjunctiva, sclera  ENT: nl external ears, nose, hearing, lips, teeth, gums, throat  Neck: no mass or thyroid enlargement  Resp: +lungs with bibasilar rales; nl to palpation, nl  effort  CV: irregularly irregular rhythm, no murmurs, rubs or gallops, no edema  GI: no ABD mass or tenderness, no HSM  : not tested  Lymph: no cervical or epitrochlear nodes  MS: +heberden's nodes with diffuse angular deformities; bilateral left>right wrist reduced extension and 1st CMC squaring;  +right shoulder with only minimal 30 degrees abduction and limited rotation; neck rotation 45 degrees bilaterally and no extension; ; All other TMJ, neck, shoulder, elbow, wrist, hand, spine, hip, knee, ankle, and foot joints were examined and otherwise found normal. Normal  strength. Intact tuck. Fairly intact prayer.  Skin: no nail pitting, alopecia, rash  Neuro: nl cranial nerves, strength, sensation, no DTRs.   Psych: nl judgement, orientation, memory, affect.    Laboratory:    Component      Latest Ref Rng & Units 7/11/2017   WBC      4.0 - 11.0 10e9/L 11.1 (H)   RBC Count      4.4 - 5.9 10e12/L 4.54   Hemoglobin      13.3 - 17.7 g/dL 13.4   Hematocrit      40.0 - 53.0 % 44.3   MCV      78 - 100 fl 98   MCH      26.5 - 33.0 pg 29.5   MCHC      31.5 - 36.5 g/dL 30.2 (L)   RDW      10.0 - 15.0 % 13.9   Platelet Count      150 - 450 10e9/L 200   Diff Method       Automated Method   % Neutrophils      % 69.7   % Lymphocytes      % 18.1   % Monocytes      % 8.1   % Eosinophils      % 2.9   % Basophils      % 0.6   % Immature Granulocytes      % 0.6   Nucleated RBCs      0 /100 0   Absolute Neutrophil      1.6 - 8.3 10e9/L 7.8   Absolute Lymphocytes      0.8 - 5.3 10e9/L 2.0   Absolute Monocytes      0.0 - 1.3 10e9/L 0.9   Absolute Eosinophils      0.0 - 0.7 10e9/L 0.3   Absolute Basophils      0.0 - 0.2 10e9/L 0.1   Abs Immature Granulocytes      0 - 0.4 10e9/L 0.1   Absolute Nucleated RBC       0.0   Sodium      133 - 144 mmol/L 139   Potassium      3.4 - 5.3 mmol/L 3.9   Chloride      94 - 109 mmol/L 99   Carbon Dioxide      20 - 32 mmol/L 38 (H)   Anion Gap      3 - 14 mmol/L 2 (L)   Glucose      70 - 99 mg/dL 93    Urea Nitrogen      7 - 30 mg/dL 20   Creatinine      0.66 - 1.25 mg/dL 1.07   GFR Estimate      >60 mL/min/1.7m2 67   GFR Estimate If Black      >60 mL/min/1.7m2 81   Calcium      8.5 - 10.1 mg/dL 9.4   Bilirubin Total      0.2 - 1.3 mg/dL 0.3   Albumin      3.4 - 5.0 g/dL 3.4   Protein Total      6.8 - 8.8 g/dL 7.1   Alkaline Phosphatase      40 - 150 U/L 54   ALT      0 - 70 U/L 31   AST      0 - 45 U/L 18   Color Urine       Yellow   Appearance Urine       Slightly Cloudy   Glucose Urine      NEG mg/dL Negative   Bilirubin Urine      NEG Negative   Ketones Urine      NEG mg/dL 5 (A)   Specific Gravity Urine      1.003 - 1.035 1.025   Blood Urine      NEG Negative   pH Urine      5.0 - 7.0 pH 5.0   Protein Albumin Urine      NEG mg/dL 30 (A)   Urobilinogen mg/dL      0.0 - 2.0 mg/dL 2.0   Nitrite Urine      NEG Negative   Leukocyte Esterase Urine      NEG Negative   Source       Midstream Urine   WBC Urine      0 - 2 /HPF 1   RBC Urine      0 - 2 /HPF 1   Mucous Urine      NEG /LPF Present (A)   Hyaline Casts      0 - 2 /LPF 1   CRP Inflammation      0.0 - 8.0 mg/L 3.1   Sed Rate      0 - 20 mm/h 17     Impression:    Seropositive Rheumatoid Arthritis-with no evidence of active synovitis and stable joint disease. Lung function appears stable at present. Good tolerance of the medications, and we will continue with this manangement.    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    Bone health-with longstanding prednisone use. He is now on active management with fosamax and tolerates this well. He describes some concern over long term fosamax use and risk of atypical fracture. We agree to repeat the DEXA in 1 year and consider the fosamax at that time.    Irregular heart rhythm-his primary care provider is aware and will follow up.    Long term management of immunosuppression-I will get lab toxicity screening today. Continue the twice yearly eye checks.    Plan RTC in 6 months.      Edu  ARUN Roberts MD

## 2017-11-21 NOTE — MR AVS SNAPSHOT
After Visit Summary   11/21/2017    J Luis Wolf    MRN: [id number]           Patient Information     Date Of Birth          1940        Visit Information        Provider Department      11/21/2017 10:00 AM Edu Roberts MD [facility]        Today's Diagnoses     Rheumatoid arthritis involving multiple sites with positive rheumatoid factor (H)    -  1    ILD (interstitial lung disease) (H)        Long-term use of immunosuppressant medication           Follow-ups after your visit        Follow-up notes from your care team     Return in about 6 months (around 5/21/2018).      Your next 10 appointments already scheduled     Nov 21, 2017 11:30 AM CST   Lab with  LAB   [facility] ([facility])    [address] [place] [id number] [phone]            Nov 21, 2017  2:00 PM CST   Pulmonary Treatment with Sh Pulmonary Rehab 2   [facility] ([facility])    [address]  [facility] [address]   [phone]            Nov 28, 2017 12:00 PM CST   Pulmonary Treatment with Sh Pulmonary Rehab 2   [facility])    [address]  [facility] [address]   [phone]            Nov 30, 2017 12:00 PM CST   Pulmonary Treatment with Sh Pulmonary Rehab 2   [facility] ([facility])    [address]  [facility] [address]   [phone]            Dec 05, 2017 12:00 PM CST   Pulmonary Treatment with Sh Pulmonary Rehab 2   [facility] ([facility])    [address]  [facility] [address]   [phone]            Dec 07, 2017 12:00 PM CST   Pulmonary Treatment with Sh Pulmonary Rehab 2   [facility] ([facility])    [address]  [facility] [address]   [phone]             Dec 12, 2017 12:00 PM CST   Pulmonary Treatment with Sh Pulmonary Rehab 2   [facility] ([facility])    [address]  [facility] [address]   [phone]            Apr 24, 2018 10:30 AM CDT   (Arrive by 10:15 AM)   Return Visit with Edu Roberts MD   [facility] ([facility])    [address] [facility]  [address] [facility] [id number] [phone]            Apr 25, 2018 10:00 AM CDT   FULL PULMONARY FUNCTION with  PFL A   [facility] Function [facility] ([facility])    [address]  [address] [facility] [id number] [phone]            Apr 25, 2018 10:30 AM CDT   (Arrive by 10:15 AM)   Return Interstitial Lung with Bill Kraft MD   [facility] ([facility])    [address]  [address] [facility] [id number] [phone]              Future tests that were ordered for you today     Open Standing Orders        Priority Remaining Interval Expires Ordered    CBC with platelets differential Routine 2/2 every 6 months 11/21/2018 11/21/2017    Albumin level Routine 2/2 every 6 months 11/21/2018 11/21/2017    ALT Routine 2/2 every 6 months 11/21/2018 11/21/2017    AST Routine 2/2 every 6 months 11/21/2018 11/21/2017    Creatinine Routine 2/2 every 6 months 11/21/2018 11/21/2017    CRP inflammation Routine 2/2 every 6 months 11/21/2018 11/21/2017    Erythrocyte sedimentation rate auto Routine 2/2 every 6 months 11/21/2018 11/21/2017            Who to contact     If you have questions or need follow up information about today's clinic visit or your schedule please contact [facility] directly at [phone].  Normal or non-critical lab and imaging results will be communicated to you by MyChart, letter or phone within 4 business days after the clinic has received the results. If you do not hear from  "us within 7 days, please contact the clinic through dotHIV or phone. If you have a critical or abnormal lab result, we will notify you by phone as soon as possible.  Submit refill requests through dotHIV or call your pharmacy and they will forward the refill request to us. Please allow 3 business days for your refill to be completed.          Additional Information About Your Visit        [facility] Information     dotHIV gives you secure access to your electronic health record. If you see a primary care provider, you can also send messages to your care team and make appointments. If you have questions, please call your primary care clinic.  If you do not have a primary care provider, please call [phone] and they will assist you.        Care EveryWhere ID     This is your Care EveryWhere ID. This could be used by other organizations to access your [facility] medical records  [id number]        Your Vitals Were     Pulse Height Pulse Oximetry BMI (Body Mass Index)          65 1.676 m (5' 6\") 96% 25.11 kg/m2         Blood Pressure from Last 3 Encounters:   11/21/17 138/81   10/11/17 144/86   07/12/17 126/75    Weight from Last 3 Encounters:   11/21/17 70.6 kg (155 lb 9.6 oz)   10/11/17 72.1 kg (158 lb 14.4 oz)   07/12/17 71.5 kg (157 lb 11.2 oz)                 Where to get your medicines      [facility] [address] [place] [address] [address] AT [address]  & [address]  [address] [address] [facility] [address]     Phone:  [phone]     alendronate 70 MG tablet    hydroxychloroquine 200 MG tablet          Primary Care Provider Office Phone # Fax #    Mario Foster [phone]       [facility] [address] [place] [address]        Equal Access to Services     HARPREET MARCANO : Patty Bernal, wajd luqadaha, qaybta kaalmajose alberto hernandez. [facility] [phone].    ATENCIÓN: Si " misha soliman, tiene a rivas disposición servicios gratuitos de asistencia lingüística. Vitor davis [phone].    We comply with applicable federal civil rights laws and [place] laws. We do not discriminate on the basis of race, color, national origin, age, disability, sex, sexual orientation, or gender identity.            Thank you!     Thank you for choosing [facility] RHE[facility]  for your care. Our goal is always to provide you with excellent care. Hearing back from our patients is one way we can continue to improve our services. Please take a few minutes to complete the written survey that you may receive in the mail after your visit with us. Thank you!             Your Updated Medication List - Protect others around you: Learn how to safely use, store and throw away your medicines at www.disposemymeds.org.          This list is accurate as of: 11/21/17 11:06 AM.  Always use your most recent med list.                   Brand Name Dispense Instructions for use Diagnosis    ADVIL PO      Take 200 mg by mouth        alendronate 70 MG tablet    FOSAMAX    12 tablet    Take 1 tablet (70 mg) by mouth every 7 days    ILD (interstitial lung disease) (H), Long-term use of immunosuppressant medication, Rheumatoid arthritis involving multiple sites with positive rheumatoid factor (H)       atorvastatin 10 MG tablet    LIPITOR     Take 5 mg by mouth every other day        azelastine 0.1 % spray    ASTELIN     Spray 1 spray into both nostrils 2 times daily        benzonatate 100 MG capsule    TESSALON    180 capsule    Take 1 capsule (100 mg) by mouth 3 times daily as needed for cough    ILD (interstitial lung disease) (H)       cetirizine 10 MG tablet    zyrTEC     Take 10 mg by mouth daily        D3-1000 PO      Take by mouth daily        DAILY MULTIVITAMIN PO      Take 2 tablets by mouth every morning        hydroxychloroquine 200 MG tablet    PLAQUENIL    180 tablet    Take 2 tablets (400 mg) by mouth daily    ILD  (interstitial lung disease) (H), Long-term use of immunosuppressant medication, Rheumatoid arthritis involving multiple sites with positive rheumatoid factor (H)       * predniSONE 1 MG tablet    DELTASONE    360 tablet    Patient takes 14 mg daily take 4 1mg tabs by mouth daily with 1 10mg tab.    ILD (interstitial lung disease) (H)       * predniSONE 10 MG tablet    DELTASONE    90 tablet    Take 1 tablet (10 mg) by mouth daily Patient takes a total dose of 13mg daily    ILD (interstitial lung disease) (H)       SM CALCIUM CITRATE+/VIT D3 PO      Take by mouth 2 times daily 630/500iu        sulfamethoxazole-trimethoprim 800-160 MG per tablet    BACTRIM DS/SEPTRA DS    36 tablet    Take 1 tablet by mouth Every Mon, Wed, Fri Morning 400-80    ILD (interstitial lung disease) (H)       TYLENOL PO      Take 325 mg by mouth as needed for mild pain or fever    ILD (interstitial lung disease) (H), Long-term use of immunosuppressant medication, Rheumatoid arthritis involving multiple sites with positive rheumatoid factor (H)       * Notice:  This list has 2 medication(s) that are the same as other medications prescribed for you. Read the directions carefully, and ask your doctor or other care provider to review them with you.

## 2017-11-28 ENCOUNTER — HOSPITAL ENCOUNTER (OUTPATIENT)
Dept: CARDIAC REHAB | Facility: CLINIC | Age: 77
End: 2017-11-28
Attending: INTERNAL MEDICINE
Payer: COMMERCIAL

## 2017-11-28 PROCEDURE — 40000244 ZZH STATISTIC VISIT PULM REHAB: Performed by: CLINICAL EXERCISE PHYSIOLOGIST

## 2017-11-28 PROCEDURE — G0239 OTH RESP PROC, GROUP: HCPCS | Performed by: CLINICAL EXERCISE PHYSIOLOGIST

## 2017-11-30 ENCOUNTER — HOSPITAL ENCOUNTER (OUTPATIENT)
Dept: CARDIAC REHAB | Facility: CLINIC | Age: 77
End: 2017-11-30
Attending: INTERNAL MEDICINE
Payer: COMMERCIAL

## 2017-11-30 PROCEDURE — G0239 OTH RESP PROC, GROUP: HCPCS | Performed by: CLINICAL EXERCISE PHYSIOLOGIST

## 2017-11-30 PROCEDURE — 40000244 ZZH STATISTIC VISIT PULM REHAB: Performed by: CLINICAL EXERCISE PHYSIOLOGIST

## 2017-12-04 ENCOUNTER — MYC MEDICAL ADVICE (OUTPATIENT)
Dept: PULMONOLOGY | Facility: CLINIC | Age: 77
End: 2017-12-04

## 2017-12-04 DIAGNOSIS — J84.9 ILD (INTERSTITIAL LUNG DISEASE) (H): ICD-10-CM

## 2017-12-04 RX ORDER — SULFAMETHOXAZOLE/TRIMETHOPRIM 800-160 MG
1 TABLET ORAL
Qty: 36 TABLET | Refills: 3 | Status: SHIPPED | OUTPATIENT
Start: 2017-12-04 | End: 2018-01-01

## 2017-12-05 ENCOUNTER — HOSPITAL ENCOUNTER (OUTPATIENT)
Dept: CARDIAC REHAB | Facility: CLINIC | Age: 77
End: 2017-12-05
Attending: INTERNAL MEDICINE
Payer: COMMERCIAL

## 2017-12-05 PROCEDURE — G0239 OTH RESP PROC, GROUP: HCPCS

## 2017-12-05 PROCEDURE — 40000244 ZZH STATISTIC VISIT PULM REHAB

## 2017-12-07 ENCOUNTER — HOSPITAL ENCOUNTER (OUTPATIENT)
Dept: CARDIAC REHAB | Facility: CLINIC | Age: 77
End: 2017-12-07
Attending: INTERNAL MEDICINE
Payer: COMMERCIAL

## 2017-12-07 PROCEDURE — G0239 OTH RESP PROC, GROUP: HCPCS | Performed by: CLINICAL EXERCISE PHYSIOLOGIST

## 2017-12-07 PROCEDURE — 40000244 ZZH STATISTIC VISIT PULM REHAB: Performed by: CLINICAL EXERCISE PHYSIOLOGIST

## 2017-12-12 ENCOUNTER — HOSPITAL ENCOUNTER (OUTPATIENT)
Dept: CARDIAC REHAB | Facility: CLINIC | Age: 77
End: 2017-12-12
Attending: INTERNAL MEDICINE
Payer: COMMERCIAL

## 2017-12-12 PROCEDURE — G0239 OTH RESP PROC, GROUP: HCPCS | Performed by: CLINICAL EXERCISE PHYSIOLOGIST

## 2017-12-12 PROCEDURE — 40000244 ZZH STATISTIC VISIT PULM REHAB: Performed by: CLINICAL EXERCISE PHYSIOLOGIST

## 2018-01-01 ENCOUNTER — OFFICE VISIT (OUTPATIENT)
Dept: RHEUMATOLOGY | Facility: CLINIC | Age: 78
End: 2018-01-01
Attending: INTERNAL MEDICINE
Payer: COMMERCIAL

## 2018-01-01 ENCOUNTER — RADIANT APPOINTMENT (OUTPATIENT)
Dept: ULTRASOUND IMAGING | Facility: CLINIC | Age: 78
End: 2018-01-01
Attending: INTERNAL MEDICINE
Payer: COMMERCIAL

## 2018-01-01 ENCOUNTER — TRANSFERRED RECORDS (OUTPATIENT)
Dept: HEALTH INFORMATION MANAGEMENT | Facility: CLINIC | Age: 78
End: 2018-01-01

## 2018-01-01 ENCOUNTER — MEDICAL CORRESPONDENCE (OUTPATIENT)
Dept: HEALTH INFORMATION MANAGEMENT | Facility: CLINIC | Age: 78
End: 2018-01-01

## 2018-01-01 ENCOUNTER — RADIANT APPOINTMENT (OUTPATIENT)
Dept: BONE DENSITY | Facility: CLINIC | Age: 78
End: 2018-01-01
Attending: INTERNAL MEDICINE
Payer: COMMERCIAL

## 2018-01-01 ENCOUNTER — OFFICE VISIT (OUTPATIENT)
Dept: PULMONOLOGY | Facility: CLINIC | Age: 78
End: 2018-01-01
Attending: INTERNAL MEDICINE
Payer: COMMERCIAL

## 2018-01-01 ENCOUNTER — APPOINTMENT (OUTPATIENT)
Dept: LAB | Facility: CLINIC | Age: 78
End: 2018-01-01
Payer: COMMERCIAL

## 2018-01-01 VITALS
RESPIRATION RATE: 16 BRPM | BODY MASS INDEX: 23.54 KG/M2 | OXYGEN SATURATION: 97 % | HEIGHT: 67 IN | WEIGHT: 150 LBS | DIASTOLIC BLOOD PRESSURE: 83 MMHG | SYSTOLIC BLOOD PRESSURE: 131 MMHG | HEART RATE: 84 BPM

## 2018-01-01 VITALS
TEMPERATURE: 98 F | OXYGEN SATURATION: 93 % | SYSTOLIC BLOOD PRESSURE: 130 MMHG | BODY MASS INDEX: 22.87 KG/M2 | WEIGHT: 145.7 LBS | RESPIRATION RATE: 16 BRPM | HEIGHT: 67 IN | DIASTOLIC BLOOD PRESSURE: 85 MMHG | HEART RATE: 96 BPM

## 2018-01-01 DIAGNOSIS — M94.9 DISORDER OF BONE AND CARTILAGE: ICD-10-CM

## 2018-01-01 DIAGNOSIS — J84.9 ILD (INTERSTITIAL LUNG DISEASE) (H): ICD-10-CM

## 2018-01-01 DIAGNOSIS — M81.0 OSTEOPOROSIS WITHOUT CURRENT PATHOLOGICAL FRACTURE, UNSPECIFIED OSTEOPOROSIS TYPE: ICD-10-CM

## 2018-01-01 DIAGNOSIS — M05.79 RHEUMATOID ARTHRITIS INVOLVING MULTIPLE SITES WITH POSITIVE RHEUMATOID FACTOR (H): Primary | ICD-10-CM

## 2018-01-01 DIAGNOSIS — M89.9 DISORDER OF BONE AND CARTILAGE: ICD-10-CM

## 2018-01-01 DIAGNOSIS — Z79.60 LONG-TERM USE OF IMMUNOSUPPRESSANT MEDICATION: ICD-10-CM

## 2018-01-01 DIAGNOSIS — J84.9 INTERSTITIAL LUNG DISEASE (H): Primary | ICD-10-CM

## 2018-01-01 DIAGNOSIS — M05.79 RHEUMATOID ARTHRITIS INVOLVING MULTIPLE SITES WITH POSITIVE RHEUMATOID FACTOR (H): ICD-10-CM

## 2018-01-01 DIAGNOSIS — J84.9 INTERSTITIAL LUNG DISEASE (H): ICD-10-CM

## 2018-01-01 DIAGNOSIS — K76.9 LIVER LESION: ICD-10-CM

## 2018-01-01 LAB
ALBUMIN SERPL-MCNC: 3.5 G/DL (ref 3.4–5)
ALBUMIN UR-MCNC: 30 MG/DL
ALP SERPL-CCNC: 61 U/L (ref 40–150)
ALT SERPL W P-5'-P-CCNC: 37 U/L (ref 0–70)
ANION GAP SERPL CALCULATED.3IONS-SCNC: 4 MMOL/L (ref 3–14)
APPEARANCE UR: ABNORMAL
AST SERPL W P-5'-P-CCNC: 27 U/L (ref 0–45)
BASOPHILS # BLD AUTO: 0.1 10E9/L (ref 0–0.2)
BASOPHILS NFR BLD AUTO: 0.9 %
BILIRUB SERPL-MCNC: 0.3 MG/DL (ref 0.2–1.3)
BILIRUB UR QL STRIP: ABNORMAL
BUN SERPL-MCNC: 24 MG/DL (ref 7–30)
CALCIUM SERPL-MCNC: 8.7 MG/DL (ref 8.5–10.1)
CHLORIDE SERPL-SCNC: 96 MMOL/L (ref 94–109)
CO2 SERPL-SCNC: 39 MMOL/L (ref 20–32)
COLOR UR AUTO: ABNORMAL
CREAT SERPL-MCNC: 1.03 MG/DL (ref 0.66–1.25)
CRP SERPL-MCNC: 6.8 MG/L (ref 0–8)
DIFFERENTIAL METHOD BLD: ABNORMAL
DLCOCOR-%PRED-PRE: 45 %
DLCOCOR-PRE: 10.21 ML/MIN/MMHG
DLCOUNC-%PRED-PRE: 44 %
DLCOUNC-PRE: 9.88 ML/MIN/MMHG
DLCOUNC-PRED: 22.45 ML/MIN/MMHG
EOSINOPHIL # BLD AUTO: 0.2 10E9/L (ref 0–0.7)
EOSINOPHIL NFR BLD AUTO: 2.7 %
ERV-%PRED-PRE: 34 %
ERV-PRE: 0.29 L
ERV-PRED: 0.86 L
ERYTHROCYTE [DISTWIDTH] IN BLOOD BY AUTOMATED COUNT: 13.7 % (ref 10–15)
ERYTHROCYTE [SEDIMENTATION RATE] IN BLOOD BY WESTERGREN METHOD: 18 MM/H (ref 0–20)
EXPTIME-PRE: 6.95 SEC
FEF2575-%PRED-PRE: 25 %
FEF2575-PRE: 0.48 L/SEC
FEF2575-PRED: 1.88 L/SEC
FEFMAX-%PRED-PRE: 53 %
FEFMAX-PRE: 3.5 L/SEC
FEFMAX-PRED: 6.5 L/SEC
FEV1-%PRED-PRE: 28 %
FEV1-PRE: 0.73 L
FEV1FEV6-PRE: 73 %
FEV1FEV6-PRED: 76 %
FEV1FVC-PRE: 73 %
FEV1FVC-PRED: 72 %
FEV1SVC-PRE: 63 %
FEV1SVC-PRED: 66 %
FIFMAX-PRE: 2.84 L/SEC
FRCPLETH-%PRED-PRE: 48 %
FRCPLETH-PRE: 1.73 L
FRCPLETH-PRED: 3.54 L
FVC-%PRED-PRE: 29 %
FVC-PRE: 1.01 L
FVC-PRED: 3.41 L
GFR SERPL CREATININE-BSD FRML MDRD: 70 ML/MIN/1.7M2
GLUCOSE SERPL-MCNC: 88 MG/DL (ref 70–99)
GLUCOSE UR STRIP-MCNC: NEGATIVE MG/DL
HCT VFR BLD AUTO: 45.4 % (ref 40–53)
HGB BLD-MCNC: 13.5 G/DL (ref 13.3–17.7)
HGB UR QL STRIP: NEGATIVE
HYALINE CASTS #/AREA URNS LPF: 9 /LPF (ref 0–2)
IC-%PRED-PRE: 29 %
IC-PRE: 0.87 L
IC-PRED: 3 L
IMM GRANULOCYTES # BLD: 0.1 10E9/L (ref 0–0.4)
IMM GRANULOCYTES NFR BLD: 0.6 %
KETONES UR STRIP-MCNC: 5 MG/DL
LEUKOCYTE ESTERASE UR QL STRIP: ABNORMAL
LYMPHOCYTES # BLD AUTO: 1.6 10E9/L (ref 0.8–5.3)
LYMPHOCYTES NFR BLD AUTO: 19.1 %
MCH RBC QN AUTO: 29.7 PG (ref 26.5–33)
MCHC RBC AUTO-ENTMCNC: 29.7 G/DL (ref 31.5–36.5)
MCV RBC AUTO: 100 FL (ref 78–100)
MONOCYTES # BLD AUTO: 0.8 10E9/L (ref 0–1.3)
MONOCYTES NFR BLD AUTO: 10.2 %
MUCOUS THREADS #/AREA URNS LPF: PRESENT /LPF
NEUTROPHILS # BLD AUTO: 5.4 10E9/L (ref 1.6–8.3)
NEUTROPHILS NFR BLD AUTO: 66.5 %
NITRATE UR QL: NEGATIVE
NRBC # BLD AUTO: 0 10*3/UL
NRBC BLD AUTO-RTO: 0 /100
PH UR STRIP: 5 PH (ref 5–7)
PLATELET # BLD AUTO: 193 10E9/L (ref 150–450)
POTASSIUM SERPL-SCNC: 4.3 MMOL/L (ref 3.4–5.3)
PROT SERPL-MCNC: 7.2 G/DL (ref 6.8–8.8)
RBC # BLD AUTO: 4.54 10E12/L (ref 4.4–5.9)
RBC #/AREA URNS AUTO: 1 /HPF (ref 0–2)
RVPLETH-%PRED-PRE: 53 %
RVPLETH-PRE: 1.43 L
RVPLETH-PRED: 2.7 L
SODIUM SERPL-SCNC: 139 MMOL/L (ref 133–144)
SOURCE: ABNORMAL
SP GR UR STRIP: >1.035 (ref 1–1.03)
TLCPLETH-%PRED-PRE: 41 %
TLCPLETH-PRE: 2.6 L
TLCPLETH-PRED: 6.31 L
UROBILINOGEN UR STRIP-MCNC: 4 MG/DL (ref 0–2)
VA-%PRED-PRE: 38 %
VA-PRE: 2.3 L
VC-%PRED-PRE: 30 %
VC-PRE: 1.16 L
VC-PRED: 3.86 L
WBC # BLD AUTO: 8.1 10E9/L (ref 4–11)
WBC #/AREA URNS AUTO: 1 /HPF (ref 0–5)

## 2018-01-01 PROCEDURE — 85025 COMPLETE CBC W/AUTO DIFF WBC: CPT | Performed by: INTERNAL MEDICINE

## 2018-01-01 PROCEDURE — G0463 HOSPITAL OUTPT CLINIC VISIT: HCPCS | Mod: ZF

## 2018-01-01 PROCEDURE — 85652 RBC SED RATE AUTOMATED: CPT | Performed by: INTERNAL MEDICINE

## 2018-01-01 PROCEDURE — 36415 COLL VENOUS BLD VENIPUNCTURE: CPT | Performed by: INTERNAL MEDICINE

## 2018-01-01 PROCEDURE — 86140 C-REACTIVE PROTEIN: CPT | Performed by: INTERNAL MEDICINE

## 2018-01-01 PROCEDURE — 81001 URINALYSIS AUTO W/SCOPE: CPT | Performed by: INTERNAL MEDICINE

## 2018-01-01 PROCEDURE — 80053 COMPREHEN METABOLIC PANEL: CPT | Performed by: INTERNAL MEDICINE

## 2018-01-01 RX ORDER — PREGABALIN 25 MG/1
CAPSULE ORAL
Qty: 60 CAPSULE | Refills: 5 | Status: SHIPPED | OUTPATIENT
Start: 2018-01-01 | End: 2018-01-01

## 2018-01-01 RX ORDER — HYDROXYCHLOROQUINE SULFATE 200 MG/1
400 TABLET, FILM COATED ORAL DAILY
Qty: 180 TABLET | Refills: 3 | Status: SHIPPED | OUTPATIENT
Start: 2018-01-01 | End: 2019-01-01

## 2018-01-01 RX ORDER — ALENDRONATE SODIUM 70 MG/1
70 TABLET ORAL
Qty: 12 TABLET | Refills: 3 | Status: SHIPPED | OUTPATIENT
Start: 2018-01-01 | End: 2019-01-01

## 2018-01-01 RX ORDER — ATORVASTATIN CALCIUM 10 MG/1
5 TABLET, FILM COATED ORAL EVERY OTHER DAY
COMMUNITY
End: 2019-01-01

## 2018-01-01 ASSESSMENT — PAIN SCALES - GENERAL
PAINLEVEL: NO PAIN (0)
PAINLEVEL: NO PAIN (0)

## 2018-03-06 NOTE — ADDENDUM NOTE
Encounter addended by: SpeakerLinwood on: 3/6/2018 11:05 AM<BR>     Actions taken: Sign clinical note, Episode resolved

## 2018-03-06 NOTE — PROGRESS NOTES
Respiratory Therapy Services Discharge Summary    Reason for discharge:    All goals and outcomes met, no further needs identified.    Progress towards goals:  Goals met    Recommendation(s):    Continue home exercise program.

## 2018-03-30 DIAGNOSIS — J84.9 ILD (INTERSTITIAL LUNG DISEASE) (H): Primary | ICD-10-CM

## 2018-03-30 RX ORDER — PREDNISONE 5 MG/1
15 TABLET ORAL DAILY
Qty: 45 TABLET | Refills: 3 | Status: SHIPPED | OUTPATIENT
Start: 2018-03-30 | End: 2019-01-01

## 2018-04-17 ENCOUNTER — HOSPITAL ENCOUNTER (OUTPATIENT)
Dept: CARDIAC REHAB | Facility: CLINIC | Age: 78
End: 2018-04-17
Attending: INTERNAL MEDICINE
Payer: COMMERCIAL

## 2018-04-17 DIAGNOSIS — J84.9 ILD (INTERSTITIAL LUNG DISEASE) (H): ICD-10-CM

## 2018-04-17 PROCEDURE — G0238 OTH RESP PROC, INDIV: HCPCS

## 2018-04-17 PROCEDURE — 40000244 ZZH STATISTIC VISIT PULM REHAB

## 2018-04-17 PROCEDURE — G0237 THERAPEUTIC PROCD STRG ENDUR: HCPCS

## 2018-04-24 ENCOUNTER — NURSE TRIAGE (OUTPATIENT)
Dept: NURSING | Facility: CLINIC | Age: 78
End: 2018-04-24

## 2018-04-24 ENCOUNTER — TELEPHONE (OUTPATIENT)
Dept: RHEUMATOLOGY | Facility: CLINIC | Age: 78
End: 2018-04-24

## 2018-04-24 ENCOUNTER — HOSPITAL ENCOUNTER (EMERGENCY)
Facility: CLINIC | Age: 78
Discharge: HOME | End: 2018-04-24
Attending: EMERGENCY MEDICINE | Admitting: EMERGENCY MEDICINE
Payer: COMMERCIAL

## 2018-04-24 ENCOUNTER — OFFICE VISIT (OUTPATIENT)
Dept: RHEUMATOLOGY | Facility: CLINIC | Age: 78
End: 2018-04-24
Attending: INTERNAL MEDICINE
Payer: COMMERCIAL

## 2018-04-24 ENCOUNTER — APPOINTMENT (OUTPATIENT)
Dept: ULTRASOUND IMAGING | Facility: CLINIC | Age: 78
End: 2018-04-24
Attending: EMERGENCY MEDICINE
Payer: COMMERCIAL

## 2018-04-24 VITALS
TEMPERATURE: 99.6 F | SYSTOLIC BLOOD PRESSURE: 146 MMHG | BODY MASS INDEX: 24.85 KG/M2 | DIASTOLIC BLOOD PRESSURE: 78 MMHG | HEART RATE: 103 BPM | HEIGHT: 66 IN | WEIGHT: 154.6 LBS | OXYGEN SATURATION: 95 %

## 2018-04-24 VITALS
TEMPERATURE: 97.8 F | SYSTOLIC BLOOD PRESSURE: 140 MMHG | HEART RATE: 97 BPM | DIASTOLIC BLOOD PRESSURE: 108 MMHG | OXYGEN SATURATION: 96 %

## 2018-04-24 DIAGNOSIS — R79.89 ELEVATED LIVER FUNCTION TESTS: ICD-10-CM

## 2018-04-24 DIAGNOSIS — M94.9 DISORDER OF BONE AND CARTILAGE: ICD-10-CM

## 2018-04-24 DIAGNOSIS — R79.89 LIVER FUNCTION TEST ABNORMALITY: ICD-10-CM

## 2018-04-24 DIAGNOSIS — M05.79 RHEUMATOID ARTHRITIS INVOLVING MULTIPLE SITES WITH POSITIVE RHEUMATOID FACTOR (H): ICD-10-CM

## 2018-04-24 DIAGNOSIS — Z79.60 LONG-TERM USE OF IMMUNOSUPPRESSANT MEDICATION: ICD-10-CM

## 2018-04-24 DIAGNOSIS — M81.0 OSTEOPOROSIS WITHOUT CURRENT PATHOLOGICAL FRACTURE, UNSPECIFIED OSTEOPOROSIS TYPE: ICD-10-CM

## 2018-04-24 DIAGNOSIS — J84.9 ILD (INTERSTITIAL LUNG DISEASE) (H): ICD-10-CM

## 2018-04-24 DIAGNOSIS — M89.9 DISORDER OF BONE AND CARTILAGE: ICD-10-CM

## 2018-04-24 DIAGNOSIS — M05.79 RHEUMATOID ARTHRITIS INVOLVING MULTIPLE SITES WITH POSITIVE RHEUMATOID FACTOR (H): Primary | ICD-10-CM

## 2018-04-24 DIAGNOSIS — R79.89 LIVER FUNCTION TEST ABNORMALITY: Primary | ICD-10-CM

## 2018-04-24 LAB
ALBUMIN SERPL-MCNC: 3.2 G/DL (ref 3.4–5)
ALBUMIN SERPL-MCNC: 3.3 G/DL (ref 3.4–5)
ALBUMIN SERPL-MCNC: NORMAL G/DL (ref 3.4–5)
ALP SERPL-CCNC: 178 U/L (ref 40–150)
ALP SERPL-CCNC: 188 U/L (ref 40–150)
ALT SERPL W P-5'-P-CCNC: 1417 U/L (ref 0–70)
ALT SERPL W P-5'-P-CCNC: 1541 U/L (ref 0–70)
ALT SERPL W P-5'-P-CCNC: NORMAL U/L (ref 0–70)
ANION GAP SERPL CALCULATED.3IONS-SCNC: 6 MMOL/L (ref 3–14)
ANION GAP SERPL CALCULATED.3IONS-SCNC: 9 MMOL/L (ref 3–14)
APAP SERPL-MCNC: <2 MG/L (ref 10–20)
AST SERPL W P-5'-P-CCNC: 1243 U/L (ref 0–45)
AST SERPL W P-5'-P-CCNC: 1494 U/L (ref 0–45)
AST SERPL W P-5'-P-CCNC: NORMAL U/L (ref 0–45)
BASOPHILS # BLD AUTO: 0 10E9/L (ref 0–0.2)
BASOPHILS # BLD AUTO: 0.1 10E9/L (ref 0–0.2)
BASOPHILS NFR BLD AUTO: 0.4 %
BASOPHILS NFR BLD AUTO: 0.6 %
BILIRUB DIRECT SERPL-MCNC: 0.4 MG/DL (ref 0–0.2)
BILIRUB SERPL-MCNC: 0.4 MG/DL (ref 0.2–1.3)
BILIRUB SERPL-MCNC: 0.8 MG/DL (ref 0.2–1.3)
BUN SERPL-MCNC: 18 MG/DL (ref 7–30)
BUN SERPL-MCNC: 18 MG/DL (ref 7–30)
CALCIUM SERPL-MCNC: 9.3 MG/DL (ref 8.5–10.1)
CALCIUM SERPL-MCNC: 9.3 MG/DL (ref 8.5–10.1)
CHLORIDE SERPL-SCNC: 107 MMOL/L (ref 94–109)
CHLORIDE SERPL-SCNC: 99 MMOL/L (ref 94–109)
CK SERPL-CCNC: 109 U/L (ref 30–300)
CK SERPL-CCNC: 92 U/L (ref 30–300)
CO2 SERPL-SCNC: 28 MMOL/L (ref 20–32)
CO2 SERPL-SCNC: 33 MMOL/L (ref 20–32)
CREAT SERPL-MCNC: 0.79 MG/DL (ref 0.66–1.25)
CREAT SERPL-MCNC: 0.81 MG/DL (ref 0.66–1.25)
CREAT SERPL-MCNC: 0.81 MG/DL (ref 0.66–1.25)
CRP SERPL-MCNC: 41.4 MG/L (ref 0–8)
DEPRECATED CALCIDIOL+CALCIFEROL SERPL-MC: 45 UG/L (ref 20–75)
DIFFERENTIAL METHOD BLD: ABNORMAL
DIFFERENTIAL METHOD BLD: ABNORMAL
EOSINOPHIL # BLD AUTO: 0 10E9/L (ref 0–0.7)
EOSINOPHIL # BLD AUTO: 0.3 10E9/L (ref 0–0.7)
EOSINOPHIL NFR BLD AUTO: 0.4 %
EOSINOPHIL NFR BLD AUTO: 3.1 %
ERYTHROCYTE [DISTWIDTH] IN BLOOD BY AUTOMATED COUNT: 14 % (ref 10–15)
ERYTHROCYTE [DISTWIDTH] IN BLOOD BY AUTOMATED COUNT: 14.1 % (ref 10–15)
ERYTHROCYTE [SEDIMENTATION RATE] IN BLOOD BY WESTERGREN METHOD: 39 MM/H (ref 0–20)
GFR SERPL CREATININE-BSD FRML MDRD: >90 ML/MIN/1.7M2
GLUCOSE SERPL-MCNC: 106 MG/DL (ref 70–99)
GLUCOSE SERPL-MCNC: 114 MG/DL (ref 70–99)
HCT VFR BLD AUTO: 42.4 % (ref 40–53)
HCT VFR BLD AUTO: 43.2 % (ref 40–53)
HGB BLD-MCNC: 13.3 G/DL (ref 13.3–17.7)
HGB BLD-MCNC: 13.5 G/DL (ref 13.3–17.7)
IMM GRANULOCYTES # BLD: 0.1 10E9/L (ref 0–0.4)
IMM GRANULOCYTES # BLD: 0.1 10E9/L (ref 0–0.4)
IMM GRANULOCYTES NFR BLD: 1.1 %
IMM GRANULOCYTES NFR BLD: 1.2 %
INR PPP: 1 (ref 0.86–1.14)
LIPASE SERPL-CCNC: 127 U/L (ref 73–393)
LYMPHOCYTES # BLD AUTO: 0.6 10E9/L (ref 0.8–5.3)
LYMPHOCYTES # BLD AUTO: 0.7 10E9/L (ref 0.8–5.3)
LYMPHOCYTES NFR BLD AUTO: 7.1 %
LYMPHOCYTES NFR BLD AUTO: 8.4 %
MCH RBC QN AUTO: 30.2 PG (ref 26.5–33)
MCH RBC QN AUTO: 30.4 PG (ref 26.5–33)
MCHC RBC AUTO-ENTMCNC: 31.3 G/DL (ref 31.5–36.5)
MCHC RBC AUTO-ENTMCNC: 31.4 G/DL (ref 31.5–36.5)
MCV RBC AUTO: 96 FL (ref 78–100)
MCV RBC AUTO: 97 FL (ref 78–100)
MONOCYTES # BLD AUTO: 0.5 10E9/L (ref 0–1.3)
MONOCYTES # BLD AUTO: 0.9 10E9/L (ref 0–1.3)
MONOCYTES NFR BLD AUTO: 6.9 %
MONOCYTES NFR BLD AUTO: 9.1 %
NEUTROPHILS # BLD AUTO: 6 10E9/L (ref 1.6–8.3)
NEUTROPHILS # BLD AUTO: 8 10E9/L (ref 1.6–8.3)
NEUTROPHILS NFR BLD AUTO: 78.9 %
NEUTROPHILS NFR BLD AUTO: 82.8 %
NRBC # BLD AUTO: 0 10*3/UL
NRBC # BLD AUTO: 0 10*3/UL
NRBC BLD AUTO-RTO: 0 /100
NRBC BLD AUTO-RTO: 0 /100
PLATELET # BLD AUTO: 207 10E9/L (ref 150–450)
PLATELET # BLD AUTO: 221 10E9/L (ref 150–450)
POTASSIUM SERPL-SCNC: 4.1 MMOL/L (ref 3.4–5.3)
POTASSIUM SERPL-SCNC: 4.8 MMOL/L (ref 3.4–5.3)
PROT SERPL-MCNC: 7.2 G/DL (ref 6.8–8.8)
PROT SERPL-MCNC: 7.6 G/DL (ref 6.8–8.8)
RBC # BLD AUTO: 4.4 10E12/L (ref 4.4–5.9)
RBC # BLD AUTO: 4.44 10E12/L (ref 4.4–5.9)
SODIUM SERPL-SCNC: 137 MMOL/L (ref 133–144)
SODIUM SERPL-SCNC: 146 MMOL/L (ref 133–144)
WBC # BLD AUTO: 10.1 10E9/L (ref 4–11)
WBC # BLD AUTO: 7.3 10E9/L (ref 4–11)

## 2018-04-24 PROCEDURE — 82306 VITAMIN D 25 HYDROXY: CPT | Performed by: INTERNAL MEDICINE

## 2018-04-24 PROCEDURE — 87522 HEPATITIS C REVRS TRNSCRPJ: CPT | Performed by: EMERGENCY MEDICINE

## 2018-04-24 PROCEDURE — 36415 COLL VENOUS BLD VENIPUNCTURE: CPT | Performed by: INTERNAL MEDICINE

## 2018-04-24 PROCEDURE — 85610 PROTHROMBIN TIME: CPT | Performed by: PHYSICIAN ASSISTANT

## 2018-04-24 PROCEDURE — 86803 HEPATITIS C AB TEST: CPT | Performed by: EMERGENCY MEDICINE

## 2018-04-24 PROCEDURE — 86709 HEPATITIS A IGM ANTIBODY: CPT | Performed by: EMERGENCY MEDICINE

## 2018-04-24 PROCEDURE — 82550 ASSAY OF CK (CPK): CPT | Performed by: PHYSICIAN ASSISTANT

## 2018-04-24 PROCEDURE — 80053 COMPREHEN METABOLIC PANEL: CPT | Performed by: PHYSICIAN ASSISTANT

## 2018-04-24 PROCEDURE — 85025 COMPLETE CBC W/AUTO DIFF WBC: CPT | Performed by: INTERNAL MEDICINE

## 2018-04-24 PROCEDURE — 85025 COMPLETE CBC W/AUTO DIFF WBC: CPT | Performed by: PHYSICIAN ASSISTANT

## 2018-04-24 PROCEDURE — 80329 ANALGESICS NON-OPIOID 1 OR 2: CPT | Performed by: PHYSICIAN ASSISTANT

## 2018-04-24 PROCEDURE — 80048 BASIC METABOLIC PNL TOTAL CA: CPT | Performed by: INTERNAL MEDICINE

## 2018-04-24 PROCEDURE — 76705 ECHO EXAM OF ABDOMEN: CPT

## 2018-04-24 PROCEDURE — 80076 HEPATIC FUNCTION PANEL: CPT | Performed by: INTERNAL MEDICINE

## 2018-04-24 PROCEDURE — 86140 C-REACTIVE PROTEIN: CPT | Performed by: INTERNAL MEDICINE

## 2018-04-24 PROCEDURE — 85652 RBC SED RATE AUTOMATED: CPT | Performed by: INTERNAL MEDICINE

## 2018-04-24 PROCEDURE — 83690 ASSAY OF LIPASE: CPT | Performed by: PHYSICIAN ASSISTANT

## 2018-04-24 PROCEDURE — 99284 EMERGENCY DEPT VISIT MOD MDM: CPT | Mod: 25

## 2018-04-24 PROCEDURE — 87340 HEPATITIS B SURFACE AG IA: CPT | Performed by: EMERGENCY MEDICINE

## 2018-04-24 PROCEDURE — 82550 ASSAY OF CK (CPK): CPT | Performed by: INTERNAL MEDICINE

## 2018-04-24 PROCEDURE — 86705 HEP B CORE ANTIBODY IGM: CPT | Performed by: EMERGENCY MEDICINE

## 2018-04-24 PROCEDURE — G0463 HOSPITAL OUTPT CLINIC VISIT: HCPCS | Mod: ZF

## 2018-04-24 RX ORDER — HYDROXYCHLOROQUINE SULFATE 200 MG/1
400 TABLET, FILM COATED ORAL DAILY
Qty: 180 TABLET | Refills: 3 | Status: SHIPPED | OUTPATIENT
Start: 2018-04-24 | End: 2018-01-01

## 2018-04-24 RX ORDER — ALENDRONATE SODIUM 70 MG/1
70 TABLET ORAL
Qty: 12 TABLET | Refills: 3 | Status: SHIPPED | OUTPATIENT
Start: 2018-04-24 | End: 2018-08-23

## 2018-04-24 RX ORDER — LOSARTAN POTASSIUM 25 MG/1
25 TABLET ORAL DAILY
COMMUNITY
Start: 2018-03-20

## 2018-04-24 ASSESSMENT — PAIN SCALES - GENERAL: PAINLEVEL: NO PAIN (0)

## 2018-04-24 ASSESSMENT — ENCOUNTER SYMPTOMS
DIARRHEA: 0
FEVER: 0
ABDOMINAL PAIN: 0
NAUSEA: 0
DYSURIA: 0
VOMITING: 0

## 2018-04-24 NOTE — TELEPHONE ENCOUNTER
Additional Information    Negative: [1] Caller is not with the adult (patient) AND [2] reporting urgent symptoms    Negative: Lab result questions    Negative: Medication questions    Negative: Caller cannot be reached by phone    Negative: Caller has already spoken to PCP or another triager    Negative: RN needs further essential information from caller in order to complete triage    Negative: Requesting regular office appointment    Negative: [1] Caller requesting NON-URGENT health information AND [2] PCP's office is the best resource    Negative: Health Information question, no triage required and triager able to answer question    Negative: General information question, no triage required and triager able to answer question    Negative: Question about upcoming scheduled test, no triage required and triager able to answer question    Negative: [1] Caller is not with the adult (patient) AND [2] probable NON-URGENT symptoms    [1] Follow-up call to recent contact AND [2] information only call, no triage required    Protocols used: INFORMATION ONLY CALL-ADULT-GENI De León (RN care coordinator-with Dr. Edu Roberts[facility]) calls and says that J Luis had labs drawn today and that his CKR=8769 & his ALT = 1417. Sarthak says that pt. Is aware of these labs and was told to go to the ER. J Luis is going to the [facility] ER now. Sarthak says that if the , at [facility] needs to speak to Dr. Roberts, his pager = [phone]. RN then called the [facility] ER and spoke to Maribell (nurse). GAGE told Maribell this information, that Sarthak told to this FNA, and Maribell voiced understanding.

## 2018-04-24 NOTE — ED AVS SNAPSHOT
[facility]    [address]    [facility] [address]    Phone:  [phone]    Fax:  [phone]                                       J Luis Wolf   MRN: [id number]    Department:  [facility]   Date of Visit:  4/24/2018           After Visit Summary Signature Page     I have received my discharge instructions, and my questions have been answered. I have discussed any challenges I see with this plan with the nurse or doctor.    ..........................................................................................................................................  Patient/Patient Representative Signature      ..........................................................................................................................................  Patient Representative Print Name and Relationship to Patient    ..................................................               ................................................  Date                                            Time    ..........................................................................................................................................  Reviewed by Signature/Title    ...................................................              ..............................................  Date                                                            Time

## 2018-04-24 NOTE — DISCHARGE INSTRUCTIONS
STOP TAKING LIPITOR.    DO NOT TAKE TYLENOL OR ANY ALCOHOL UNTIL FURTHER EVALUATION BY RHEUMATOLOGY AND GI PHYSICIAN.    Please make an appointment to follow up with your primary care provider and [facility] [phone] as soon as possible even if entirely better.

## 2018-04-24 NOTE — TELEPHONE ENCOUNTER
Dr Roberts was paged for critical lab results: AST 1494 and ALT 1417. Dr Roberts asked that pt be notified and advises that he report to the ER for work up.     Call placed to pt, informed him of the abnormal liver tests and advised him to go to the ER. Pt is agreeable and will go to the [facility] ER. Mr Wolf said he will go this afternoon.    Contacted [facility] lab and asked that a Hepatic Panel, BMP, and CK be added to the labs drawn today. Was told they can do this.    [facility] ER should be able to view Dr Roberts's office notes from today.    Sarthak Pacheco, BSN RN  Rheumatology RN Coordinator  [facility]

## 2018-04-24 NOTE — LETTER
4/24/2018      RE: J Luis Wolf  [id number] [facility] [id number]        returns for management of RA complicated by ILD. +CCP, +RF, -GIANCARLO. No history of erosions. FVC 39%/DLCO 58% . Previously failed methotrexate, azathioprine, and mycophenolate. Prednisone dependent.    He feels his joints are fine. He has stable deformities and function. No redness, warmth or swelling to report and pain in the back only. He has right leg weakness, but he denies numbness. He has DDD and DJD with foraminal stenosis.     His breathing is stable, though more oxygen dependent with exercise. Energy is best in the morning. AM stiffness in the right hip for 15-20 minutes. He reports one episode of lower respiratory infection that required ATBs.     Prednisone 15 mg daily to control lung progression; Hydroxychlorquine 400 mg daily with negative eye check. Tylenol 500 mg and Advil 200 mg about TID and no heartburn. Alendronate 70 mg once weekly is well tolerated.     PMI:  Medical-RA with ILD, atopic rhinitis, hip bursitis and gluteal tendinopathy, osteoporosis with T4-5 vertebral fractures (T = -2.9 ), hyperlipidemia, right rotator cuff disease, DDD/DJD with foraminal stenosis.  Surgical-appendectomy, bunion surgery and toe surgical straightening, right elbow surgery  Injury-right elbow laceration, right knee injury, low back injury    SH:  Retired ,  with two children. Former smoker (5 years with occasional cigarette use), 1 EtOH per day, no illicit drug use. Silica and Right handed.    FH:  Mother lived to very old age, now dead  Father dead at 80 with heart disease and OA  Brother with celiac disease  Children and grandchildren are healthy    PMSF history personally reviewed by me today.    ROS:  +weakness in the right hip  +fragile skin with easy bruising  +some reduced hearing with wax problems  +reduced blood flow to fingers  +seasonal allergy  Remainder of the 14 point ROS  obtained and found negative.    Physical Exam:  Constitutional: WD-WN-WG cooperative; +continuous O2 therapy  Eyes: nl EOM, PERRLA, vision, conjunctiva, sclera  ENT: nl external ears, nose, hearing, lips, teeth, gums, throat  Neck: no mass or thyroid enlargement  Resp: +lungs with bibasilar rales; nl to palpation, nl effort  CV: irregularly irregular rhythm, no murmurs, rubs or gallops, no edema  GI: no ABD mass or tenderness, no HSM  : not tested  Lymph: no cervical or epitrochlear nodes  MS: +heberden's nodes with diffuse angular deformities; bilateral left>right wrist reduced extension and 1st CMC squaring;  +right shoulder with only minimal 30 degrees active abduction and limited rotation; neck rotation 45 degrees bilaterally and no extension; All other TMJ, neck, shoulder, elbow, wrist, hand, spine, hip, knee, ankle, and foot joints were examined and otherwise found normal. Normal  strength. Intact tuck. Fairly normal prayer.  Skin: no nail pitting, alopecia, rash  Neuro: nl cranial nerves, strength, sensation, no DTRs.   Psych: nl judgement, orientation, memory, affect.    Laboratory:    Component      Latest Ref Rng & Units 4/24/2018   WBC      4.0 - 11.0 10e9/L 10.1   RBC Count      4.4 - 5.9 10e12/L 4.44   Hemoglobin      13.3 - 17.7 g/dL 13.5   Hematocrit      40.0 - 53.0 % 43.2   MCV      78 - 100 fl 97   MCH      26.5 - 33.0 pg 30.4   MCHC      31.5 - 36.5 g/dL 31.3 (L)   RDW      10.0 - 15.0 % 14.0   Platelet Count      150 - 450 10e9/L 207   Diff Method       Automated Method   % Neutrophils      % 78.9   % Lymphocytes      % 7.1   % Monocytes      % 9.1   % Eosinophils      % 3.1   % Basophils      % 0.6   % Immature Granulocytes      % 1.2   Nucleated RBCs      0 /100 0   Absolute Neutrophil      1.6 - 8.3 10e9/L 8.0   Absolute Lymphocytes      0.8 - 5.3 10e9/L 0.7 (L)   Absolute Monocytes      0.0 - 1.3 10e9/L 0.9   Absolute Eosinophils      0.0 - 0.7 10e9/L 0.3   Absolute Basophils      0.0 -  0.2 10e9/L 0.1   Abs Immature Granulocytes      0 - 0.4 10e9/L 0.1   Absolute Nucleated RBC       0.0   Creatinine      0.66 - 1.25 mg/dL 0.81   GFR Estimate      >60 mL/min/1.7m2 >90   GFR Estimate If Black      >60 mL/min/1.7m2 >90   Albumin      3.4 - 5.0 g/dL 3.2 (L)   ALT      0 - 70 U/L 1417 (HH)   AST      0 - 45 U/L 1494 (HH)   CRP Inflammation      0.0 - 8.0 mg/L 41.4 (H)   Sed Rate      0 - 20 mm/h 39 (H)     Component      Latest Ref Rng & Units 11/21/2017   WBC      4.0 - 11.0 10e9/L 12.3 (H)   RBC Count      4.4 - 5.9 10e12/L 4.70   Hemoglobin      13.3 - 17.7 g/dL 14.0   Hematocrit      40.0 - 53.0 % 46.2   MCV      78 - 100 fl 98   MCH      26.5 - 33.0 pg 29.8   MCHC      31.5 - 36.5 g/dL 30.3 (L)   RDW      10.0 - 15.0 % 14.1   Platelet Count      150 - 450 10e9/L 209   Diff Method       Automated Method   % Neutrophils      % 80.0   % Lymphocytes      % 11.4   % Monocytes      % 5.9   % Eosinophils      % 1.5   % Basophils      % 0.6   % Immature Granulocytes      % 0.6   Nucleated RBCs      0 /100 0   Absolute Neutrophil      1.6 - 8.3 10e9/L 9.9 (H)   Absolute Lymphocytes      0.8 - 5.3 10e9/L 1.4   Absolute Monocytes      0.0 - 1.3 10e9/L 0.7   Absolute Eosinophils      0.0 - 0.7 10e9/L 0.2   Absolute Basophils      0.0 - 0.2 10e9/L 0.1   Abs Immature Granulocytes      0 - 0.4 10e9/L 0.1   Absolute Nucleated RBC       0.0   Creatinine      0.66 - 1.25 mg/dL 1.00   GFR Estimate      >60 mL/min/1.7m2 73   GFR Estimate If Black      >60 mL/min/1.7m2 88   Albumin      3.4 - 5.0 g/dL 3.6   ALT      0 - 70 U/L 42   AST      0 - 45 U/L 23   CRP Inflammation      0.0 - 8.0 mg/L 2.9   Sed Rate      0 - 20 mm/h 17     Impression:    Seropositive Rheumatoid Arthritis-very stable today with no evidence of systemic inflammatory disease. Good tolerance of the hydroxychloroquine and we will continue with this regimen.     Osteoarthritis-with stable deformities and well controlled pains on the current  ibuprofen/tylenol regimen.     Bone health-repeat the DEXA on RTC in 6 months.    Long term management of immunosuppression-have recommended the Shingrix vaccination and he will discuss with his PCP.    Plan RTC in 6 months.      ADDENDUM:    Severe elevation of liver enzymes (>20 times upper limit of normal). Noted on routine laboratory test screening. Associated with a rise in the CRP and ESR. Etiology of the liver irritation is unknown. Although liver autoimmune disease is formally possible, I am more concerned about being sure there is no drug reaction, vascular occlusion, or cancer. We have instructed the patient to present the his nearby E.D. for evaluation. I will add on a hepatic panel, lytes, and CK to today's blood sample.      Edu Roberts MD

## 2018-04-24 NOTE — ED PROVIDER NOTES
History     Chief Complaint:  Abnormal labs    HPI   J Luis Wolf is a 78 year old male with a history of rheumatoid arthritis and interstitial lung disease on 2L oxygen who presents to the emergency department today for evaluation of abnormal labs. Per chart review, the patient had a follow up with his rheumatologist, Dr. Edu Roberts, earlier today for a rheumatoid arthritis. At this visit, the patient had blood work obtained. His AST (1494) and ALT (1417) were elevated, therefore, the patient received a call and referred to the ED for further evaluation. The patient usually takes one tylenol and one advil together 2-3 times a day. Once at breakfast, another time at lunch, and occasionally at dinner if he is doing more activity at night. The patient does not have more than four tablets of tylenol in a day. He is otherwise feeling fine. Additionally, the patient was increased from 14mg of prednisone a day to 15mg a day last month. The patient had two beers last night, but does not regularly drink. The patient denies abdominal pain, nausea, vomiting, diarrhea, dysuria, and illegal drug use.     Allergies:  Flu Virus Vaccine  Pistachios [Nuts]     Medications:    alendronate (FOSAMAX) 70 MG tablet  benzonatate (TESSALON) 100 MG capsule  hydroxychloroquine (PLAQUENIL) 200 MG tablet  losartan (COZAAR) 25 MG tablet  predniSONE (DELTASONE) 1 MG tablet  predniSONE (DELTASONE) 10 MG tablet  predniSONE (DELTASONE) 5 MG tablet  sulfamethoxazole-trimethoprim (BACTRIM DS/SEPTRA DS) 800-160 MG per tablet  atorvastatin (LIPITOR) 10 MG tablet  azelastine (ASTELIN) 137 MCG/SPRAY nasal spray  cetirizine (ZYRTEC) 10 MG tablet    Past Medical History:    Rheumatoid arthritis  Osteopenia  Osteoporosis  Interstitial lung disease  Hyperlipidemia  Colonic polyps    Past Surgical History:    Appendectomy  Back surgery - compression fracture thoracic vertebra  Colonoscopy  Orthopedic surgery - bunionectomy  Soft tissue  surgery  Thoracic surgery  - pulmonary fibrosis    Family History:    CAD  Hyperlipidemia  Hypertension    Social History:  The patient was alone.  Smoking Status: Former  Smokeless Tobacco: Never  Alcohol Use: Occasionally  Marital Status:        Review of Systems   Constitutional: Negative for fever.   Gastrointestinal: Negative for abdominal pain, diarrhea, nausea and vomiting.   Genitourinary: Negative for dysuria.   All other systems reviewed and are negative.    Physical Exam     Patient Vitals for the past 24 hrs:   BP Temp Temp src Pulse Heart Rate SpO2   04/24/18 1608 (!) 146/97 97.8  F (36.6  C) Oral 97 97 97 %     Physical Exam    Constitutional:  Pleasant, age appropriate.       Resting comfortably in the bed.  HEENT:    Oropharynx is moist  Eyes:    Conjunctiva normal, PERRL  Neck:    Supple, no meningismus.     CV:     Regular rate and rhythm.      No murmurs, rubs or gallops.     No lower extremity edema.  PULM:    Clear to auscultation bilateral.       No respiratory distress.      Good air exchange.     No rales or wheezing.  ABD:    Soft, non-distended.       No abdominal tenderness.     Bowel sounds normal.     No pulsatile masses.       No rebound, guarding or rigidity.     No CVA tenderness.      No hepatosplenomegaly.  MSK:     No gross deformity to all four extremities.   LYMPH:   No cervical lymphadenopathy.  NEURO:   Alert.  Good muscular tone, no atrophy.   Skin:    Warm, dry and intact.    Psych:    Mood is good and affect is appropriate.      Emergency Department Course     Imaging:  Radiology findings were communicated with the patient who voiced understanding of the findings.  Abdomen US Limited (RUQ only)  IMPRESSION:   1. No definite gallbladder pathology but the gallbladder may be  partially contracted. A repeat exam after an overnight fast could be  obtained if clinically indicated.  2. Common bile duct is not visualized and presumably not dilated.  3. Pancreas is partially  obscured.  Report per radiology     Laboratory:  Laboratory findings were communicated with the patient who voiced understanding of the findings.  CBC: WNL. (WBC 7.3, HGB 13.3, )   CMP:  (H), Carbon dioxide 33 (H), Glucose 106 (H), Albumin 3.3 (L), Alkphos 188 (H), ALT 1541 (HH), AST 1243 (HH) o/w WNL (Creatinine 0.79)  Lipase: 127  CK total: 109   INR: 1.00  Acetaminophen level: <2    Emergency Department Course:  Nursing notes and vitals reviewed.  The patient was sent for a Abdomen US Limited (RUQ only) while in the emergency department, results above.   IV was inserted and blood was drawn for laboratory testing, results above.  1645: I performed an exam of the patient as documented above.   1815: I spoke with pharmacy regarding the patient's mediations.   1830: Patient rechecked and updated.   Findings and plan explained to the Patient. Patient discharged home with instructions regarding supportive care, medications, and reasons to return. The importance of close follow-up was reviewed.   I personally reviewed the laboratory results with the Patient and answered all related questions prior to discharge.    Impression & Plan      Medical Decision Makin-year-old male referred to the ED after he was seen by his rheumatologist and noted to have markedly abnormal transaminase levels.  He has no evidence of obstructive physiology based on laboratory studies and no evidence of obstruction on radiographic imaging.  No concern for acetaminophen toxicity. He does not use excessive alcohol or use illicit drugs.  The pharmacist reviewed his medications in which there is no specific drug interaction that is concerning.  Bactrim and hydroxychloroquine can cause elevated liver function tests but less likely than his Lipitor.  Patient will discontinue his Lipitor immediately.  He will continue his other medications.  It is possible this is viral induced in which a number of viral studies have been sent off  and currently pending.  This may also represent autoimmune hepatitis.  Patient safe for discharge home.  No evidence of liver failure.  Patient will need further evaluation by gastroenterology as well as rheumatology.    Diagnosis:    ICD-10-CM    1. Elevated liver function tests R79.89      Disposition:  Discharged to home    Scribe Disclosure:  I, Kelsea Nicholas, am serving as a scribe at 4:46 PM on 4/24/2018 to document services personally performed by Jameel Felix MD based on my observations and the provider's statements to me.     4/24/2018   [facility] EMERGENCY DEPARTMENT       Jameel Felix MD  04/24/18 1915

## 2018-04-24 NOTE — ED TRIAGE NOTES
Pt presents to ED from clinic with abnormal lab values. Elevated AST and ALT. Pt on immunosuppressants for rheumatoid arthritis. ABCs intact. A&Ox3. VSS.

## 2018-04-24 NOTE — PROGRESS NOTES
returns for management of RA complicated by ILD. +CCP, +RF, -GIANCARLO. No history of erosions. FVC 39%/DLCO 58% . Previously failed methotrexate, azathioprine, and mycophenolate. Prednisone dependent.    He feels his joints are fine. He has stable deformities and function. No redness, warmth or swelling to report and pain in the back only. He has right leg weakness, but he denies numbness. He has DDD and DJD with foraminal stenosis.     His breathing is stable, though more oxygen dependent with exercise. Energy is best in the morning. AM stiffness in the right hip for 15-20 minutes. He reports one episode of lower respiratory infection that required ATBs.     Prednisone 15 mg daily to control lung progression; Hydroxychlorquine 400 mg daily with negative eye check. Tylenol 500 mg and Advil 200 mg about TID and no heartburn. Alendronate 70 mg once weekly is well tolerated.     PMI:  Medical-RA with ILD, atopic rhinitis, hip bursitis and gluteal tendinopathy, osteoporosis with T4-5 vertebral fractures (T = -2.9 ), hyperlipidemia, right rotator cuff disease, DDD/DJD with foraminal stenosis.  Surgical-appendectomy, bunion surgery and toe surgical straightening, right elbow surgery  Injury-right elbow laceration, right knee injury, low back injury    SH:  Retired ,  with two children. Former smoker (5 years with occasional cigarette use), 1 EtOH per day, no illicit drug use. Silica and Right handed.    FH:  Mother lived to very old age, now dead  Father dead at 80 with heart disease and OA  Brother with celiac disease  Children and grandchildren are healthy    PMSF history personally reviewed by me today.    ROS:  +weakness in the right hip  +fragile skin with easy bruising  +some reduced hearing with wax problems  +reduced blood flow to fingers  +seasonal allergy  Remainder of the 14 point ROS obtained and found negative.    Physical Exam:  Constitutional: WD-WN-WG cooperative;  +continuous O2 therapy  Eyes: nl EOM, PERRLA, vision, conjunctiva, sclera  ENT: nl external ears, nose, hearing, lips, teeth, gums, throat  Neck: no mass or thyroid enlargement  Resp: +lungs with bibasilar rales; nl to palpation, nl effort  CV: irregularly irregular rhythm, no murmurs, rubs or gallops, no edema  GI: no ABD mass or tenderness, no HSM  : not tested  Lymph: no cervical or epitrochlear nodes  MS: +heberden's nodes with diffuse angular deformities; bilateral left>right wrist reduced extension and 1st CMC squaring;  +right shoulder with only minimal 30 degrees active abduction and limited rotation; neck rotation 45 degrees bilaterally and no extension; All other TMJ, neck, shoulder, elbow, wrist, hand, spine, hip, knee, ankle, and foot joints were examined and otherwise found normal. Normal  strength. Intact tuck. Fairly normal prayer.  Skin: no nail pitting, alopecia, rash  Neuro: nl cranial nerves, strength, sensation, no DTRs.   Psych: nl judgement, orientation, memory, affect.    Laboratory:    Component      Latest Ref Rng & Units 4/24/2018   WBC      4.0 - 11.0 10e9/L 10.1   RBC Count      4.4 - 5.9 10e12/L 4.44   Hemoglobin      13.3 - 17.7 g/dL 13.5   Hematocrit      40.0 - 53.0 % 43.2   MCV      78 - 100 fl 97   MCH      26.5 - 33.0 pg 30.4   MCHC      31.5 - 36.5 g/dL 31.3 (L)   RDW      10.0 - 15.0 % 14.0   Platelet Count      150 - 450 10e9/L 207   Diff Method       Automated Method   % Neutrophils      % 78.9   % Lymphocytes      % 7.1   % Monocytes      % 9.1   % Eosinophils      % 3.1   % Basophils      % 0.6   % Immature Granulocytes      % 1.2   Nucleated RBCs      0 /100 0   Absolute Neutrophil      1.6 - 8.3 10e9/L 8.0   Absolute Lymphocytes      0.8 - 5.3 10e9/L 0.7 (L)   Absolute Monocytes      0.0 - 1.3 10e9/L 0.9   Absolute Eosinophils      0.0 - 0.7 10e9/L 0.3   Absolute Basophils      0.0 - 0.2 10e9/L 0.1   Abs Immature Granulocytes      0 - 0.4 10e9/L 0.1   Absolute  Nucleated RBC       0.0   Creatinine      0.66 - 1.25 mg/dL 0.81   GFR Estimate      >60 mL/min/1.7m2 >90   GFR Estimate If Black      >60 mL/min/1.7m2 >90   Albumin      3.4 - 5.0 g/dL 3.2 (L)   ALT      0 - 70 U/L 1417 (HH)   AST      0 - 45 U/L 1494 (HH)   CRP Inflammation      0.0 - 8.0 mg/L 41.4 (H)   Sed Rate      0 - 20 mm/h 39 (H)     Component      Latest Ref Rng & Units 11/21/2017   WBC      4.0 - 11.0 10e9/L 12.3 (H)   RBC Count      4.4 - 5.9 10e12/L 4.70   Hemoglobin      13.3 - 17.7 g/dL 14.0   Hematocrit      40.0 - 53.0 % 46.2   MCV      78 - 100 fl 98   MCH      26.5 - 33.0 pg 29.8   MCHC      31.5 - 36.5 g/dL 30.3 (L)   RDW      10.0 - 15.0 % 14.1   Platelet Count      150 - 450 10e9/L 209   Diff Method       Automated Method   % Neutrophils      % 80.0   % Lymphocytes      % 11.4   % Monocytes      % 5.9   % Eosinophils      % 1.5   % Basophils      % 0.6   % Immature Granulocytes      % 0.6   Nucleated RBCs      0 /100 0   Absolute Neutrophil      1.6 - 8.3 10e9/L 9.9 (H)   Absolute Lymphocytes      0.8 - 5.3 10e9/L 1.4   Absolute Monocytes      0.0 - 1.3 10e9/L 0.7   Absolute Eosinophils      0.0 - 0.7 10e9/L 0.2   Absolute Basophils      0.0 - 0.2 10e9/L 0.1   Abs Immature Granulocytes      0 - 0.4 10e9/L 0.1   Absolute Nucleated RBC       0.0   Creatinine      0.66 - 1.25 mg/dL 1.00   GFR Estimate      >60 mL/min/1.7m2 73   GFR Estimate If Black      >60 mL/min/1.7m2 88   Albumin      3.4 - 5.0 g/dL 3.6   ALT      0 - 70 U/L 42   AST      0 - 45 U/L 23   CRP Inflammation      0.0 - 8.0 mg/L 2.9   Sed Rate      0 - 20 mm/h 17     Impression:    Seropositive Rheumatoid Arthritis-very stable today with no evidence of systemic inflammatory disease. Good tolerance of the hydroxychloroquine and we will continue with this regimen.     Osteoarthritis-with stable deformities and well controlled pains on the current ibuprofen/tylenol regimen.     Bone health-repeat the DEXA on RTC in 6 months.    Long  term management of immunosuppression-have recommended the Shingrix vaccination and he will discuss with his PCP.    Plan RTC in 6 months.      ADDENDUM:    Severe elevation of liver enzymes (>20 times upper limit of normal). Noted on routine laboratory test screening. Associated with a rise in the CRP and ESR. Etiology of the liver irritation is unknown. Although liver autoimmune disease is formally possible, I am more concerned about being sure there is no drug reaction, vascular occlusion, or cancer. We have instructed the patient to present the his nearby E.D. for evaluation. I will add on a hepatic panel, lytes, and CK to today's blood sample.

## 2018-04-24 NOTE — NURSING NOTE
"Chief Complaint   Patient presents with     RECHECK     RA       Initial /78  Pulse 103  Temp 99.6  F (37.6  C) (Oral)  Ht 1.676 m (5' 6\")  Wt 70.1 kg (154 lb 9.6 oz)  SpO2 95%  BMI 24.95 kg/m2 Estimated body mass index is 24.95 kg/(m^2) as calculated from the following:    Height as of this encounter: 1.676 m (5' 6\").    Weight as of this encounter: 70.1 kg (154 lb 9.6 oz).  Medication Reconciliation: complete     Sergei Garcia MA    "

## 2018-04-24 NOTE — ED AVS SNAPSHOT
[facility]    [address]    [facility] [address]    Phone:  [phone]    Fax:  [phone]                                       J Luis Wolf   MRN: [id number]    Department:  [facility]   Date of Visit:  4/24/2018           Patient Information     Date Of Birth          1940        Your diagnoses for this visit were:     Elevated liver function tests        You were seen by Jameel Felix MD.        Discharge Instructions       STOP TAKING LIPITOR.    DO NOT TAKE TYLENOL OR ANY ALCOHOL UNTIL FURTHER EVALUATION BY RHEUMATOLOGY AND GI PHYSICIAN.    Please make an appointment to follow up with your primary care provider and [facility] [phone] as soon as possible even if entirely better.          Discharge References/Attachments     LIVER PANEL (ENGLISH)    HEPATITIS, CAUSE UNKNOWN (TEST PENDING) (ENGLISH)      Your next 10 appointments already scheduled     May 09, 2018  3:30 PM CDT   FULL PULMONARY FUNCTION with  PFL C   [facility] Pulmonary Function Testing ([facility])    [address] [place] [address]  3rd Floor  [facility] [id number] [phone]            May 09, 2018  4:30 PM CDT   (Arrive by 4:15 PM)   Return Interstitial Lung with Bill Kraft MD   [facility] ([facility])    [address] [place] [address]  [address] [place] [id number] [phone]            Nov 06, 2018 10:00 AM CST   DX HIP/PELVIS/SPINE with UCDX1   [facility] ([facility])    [address] [place] [address]  1st Floor  [facility] [id number] [phone]           Please do not take any of the following 24 hours prior to the day of your exam: vitamins, calcium tablets, antacids.  If possible, please wear clothes without metal (snaps, zippers). A sweatsuit works well.            Nov 06, 2018 10:30 AM CST    (Arrive by 10:15 AM)   Return Visit with Edu Roberts MD   [facility] ([facility])    [address]  [address]  [facility] [address] [phone]              24 Hour Appointment Hotline       To make an appointment at any [facility], call [phone]). If you don't have a family doctor or clinic, we will help you find one. [facility] are conveniently located to serve the needs of you and your family.             Review of your medicines      Our records show that you are taking the medicines listed below. If these are incorrect, please call your family doctor or clinic.        Dose / Directions Last dose taken    ADVIL PO   Dose:  200 mg        Take 200 mg by mouth   Refills:  0        alendronate 70 MG tablet   Commonly known as:  FOSAMAX   Dose:  70 mg   Quantity:  12 tablet        Take 1 tablet (70 mg) by mouth every 7 days   Refills:  3        atorvastatin 10 MG tablet   Commonly known as:  LIPITOR   Dose:  5 mg        Take 5 mg by mouth every other day   Refills:  0        azelastine 0.1 % spray   Commonly known as:  ASTELIN   Dose:  1 spray        Spray 1 spray into both nostrils 2 times daily   Refills:  0        benzonatate 100 MG capsule   Commonly known as:  TESSALON   Dose:  100 mg   Quantity:  180 capsule        Take 1 capsule (100 mg) by mouth 3 times daily as needed for cough   Refills:  11        cetirizine 10 MG tablet   Commonly known as:  zyrTEC   Dose:  10 mg        Take 10 mg by mouth daily   Refills:  0        D3-1000 PO        Take by mouth daily   Refills:  0        DAILY MULTIVITAMIN PO   Dose:  2 tablet        Take 2 tablets by mouth every morning   Refills:  0        hydroxychloroquine 200 MG tablet   Commonly known as:  PLAQUENIL   Dose:  400 mg   Quantity:  180 tablet        Take 2 tablets (400 mg) by mouth daily   Refills:  3        losartan 25 MG tablet   Commonly known as:  COZAAR   Dose:  25 mg         Take 25 mg by mouth   Refills:  0        * predniSONE 1 MG tablet   Commonly known as:  DELTASONE   Quantity:  360 tablet        Patient takes 14 mg daily take 4 1mg tabs by mouth daily with 1 10mg tab.   Refills:  3        * predniSONE 10 MG tablet   Commonly known as:  DELTASONE   Dose:  10 mg   Quantity:  90 tablet        Take 1 tablet (10 mg) by mouth daily Patient takes a total dose of 13mg daily   Refills:  3        * predniSONE 5 MG tablet   Commonly known as:  DELTASONE   Dose:  15 mg   Quantity:  45 tablet        Take 3 tablets (15 mg) by mouth daily   Refills:  3        SM CALCIUM CITRATE+/VIT D3 PO        Take by mouth 2 times daily 630/500iu   Refills:  0        sulfamethoxazole-trimethoprim 800-160 MG per tablet   Commonly known as:  BACTRIM DS/SEPTRA DS   Dose:  1 tablet   Quantity:  36 tablet        Take 1 tablet by mouth Every Mon, Wed, Fri Morning 400-80   Refills:  3        TYLENOL PO   Dose:  325 mg        Take 325 mg by mouth as needed for mild pain or fever   Refills:  0        * Notice:  This list has 3 medication(s) that are the same as other medications prescribed for you. Read the directions carefully, and ask your doctor or other care provider to review them with you.            Procedures and tests performed during your visit     Abdomen US, limited (RUQ only)    Acetaminophen level    CBC with platelets differential    CK total    Comprehensive metabolic panel    Hepatitis A antibody IgM    Hepatitis B core antibody IgM    Hepatitis B surface antigen    Hepatitis C RNA quantitative    Hepatitis C antibody    INR    Lipase    Peripheral IV catheter      Orders Needing Specimen Collection     None      Pending Results     Date and Time Order Name Status Description    4/24/2018 1657 Abdomen US, limited (RUQ only) Preliminary             Pending Culture Results     No orders found from 4/22/2018 to 4/25/2018.            Pending Results Instructions     If you had any lab results that were  not finalized at the time of your Discharge, you can call the ED Lab Result RN at [phone]. You will be contacted by this team for any positive Lab results or changes in treatment. The nurses are available 7 days a week from 10A to 6:30P.  You can leave a message 24 hours per day and they will return your call.        Test Results From Your Hospital Stay        4/24/2018  4:43 PM      Component Results     Component Value Ref Range & Units Status    WBC 7.3 4.0 - 11.0 10e9/L Final    RBC Count 4.40 4.4 - 5.9 10e12/L Final    Hemoglobin 13.3 13.3 - 17.7 g/dL Final    Hematocrit 42.4 40.0 - 53.0 % Final    MCV 96 78 - 100 fl Final    MCH 30.2 26.5 - 33.0 pg Final    MCHC 31.4 (L) 31.5 - 36.5 g/dL Final    RDW 14.1 10.0 - 15.0 % Final    Platelet Count 221 150 - 450 10e9/L Final    Diff Method Automated Method  Final    % Neutrophils 82.8 % Final    % Lymphocytes 8.4 % Final    % Monocytes 6.9 % Final    % Eosinophils 0.4 % Final    % Basophils 0.4 % Final    % Immature Granulocytes 1.1 % Final    Nucleated RBCs 0 0 /100 Final    Absolute Neutrophil 6.0 1.6 - 8.3 10e9/L Final    Absolute Lymphocytes 0.6 (L) 0.8 - 5.3 10e9/L Final    Absolute Monocytes 0.5 0.0 - 1.3 10e9/L Final    Absolute Eosinophils 0.0 0.0 - 0.7 10e9/L Final    Absolute Basophils 0.0 0.0 - 0.2 10e9/L Final    Abs Immature Granulocytes 0.1 0 - 0.4 10e9/L Final    Absolute Nucleated RBC 0.0  Final         4/24/2018  5:29 PM      Component Results     Component Value Ref Range & Units Status    Sodium 146 (H) 133 - 144 mmol/L Final    Potassium 4.8 3.4 - 5.3 mmol/L Final    Chloride 107 94 - 109 mmol/L Final    Carbon Dioxide 33 (H) 20 - 32 mmol/L Final    Anion Gap 6 3 - 14 mmol/L Final    Glucose 106 (H) 70 - 99 mg/dL Final    Urea Nitrogen 18 7 - 30 mg/dL Final    Creatinine 0.79 0.66 - 1.25 mg/dL Final    GFR Estimate >90 >60 mL/min/1.7m2 Final    Non  GFR Calc    GFR Estimate If Black >90 >60 mL/min/1.7m2 Final    African  American GFR Calc    Calcium 9.3 8.5 - 10.1 mg/dL Final    Bilirubin Total 0.4 0.2 - 1.3 mg/dL Final    Albumin 3.3 (L) 3.4 - 5.0 g/dL Final    Protein Total 7.6 6.8 - 8.8 g/dL Final    Alkaline Phosphatase 188 (H) 40 - 150 U/L Final    ALT 1541 (HH) 0 - 70 U/L Final    Specimen run with a dilution  Critical Value called to and read back by  KENNEY RAMÍREZ (ERB) N 04.24.18 AT 1725 BY AM      AST 1243 (HH) 0 - 45 U/L Final    Specimen run with a dilution  Critical Value called to and read back by  KENNEY RAMÍREZ (ERB) N 04.24.18 AT 1725 BY AM           4/24/2018  5:29 PM      Component Results     Component Value Ref Range & Units Status    Lipase 127 73 - 393 U/L Final         4/24/2018  5:23 PM      Component Results     Component Value Ref Range & Units Status    CK Total 109 30 - 300 U/L Final         4/24/2018  5:12 PM      Component Results     Component Value Ref Range & Units Status    INR 1.00 0.86 - 1.14 Final         4/24/2018  5:39 PM      Narrative     ULTRASOUND ABDOMEN LIMITED 4/24/2018 5:22 PM    HISTORY: Elevated liver function tests.    COMPARISON: None.    FINDINGS:  Gallbladder: No gallstones, wall thickening or pericholecystic fluid.  The gallbladder is relatively small and may be partially contracted.  If gallstones are strongly clinically suspected, a repeat exam after  an overnight fast may be more sensitive.    Common bile duct: Not visualized and presumably not dilated.    Liver: Normal.    Pancreas: Pancreatic tail and head are partially obscured. The  visualized pancreas is normal.    Right kidney: Normal.    Proximal aorta and inferior vena cava appear normal.        Impression     IMPRESSION:   1. No definite gallbladder pathology but the gallbladder may be  partially contracted. A repeat exam after an overnight fast could be  obtained if clinically indicated.  2. Common bile duct is not visualized and presumably not dilated.  3. Pancreas is partially obscured.           4/24/2018  5:44 PM       Component Results     Component Value Ref Range & Units Status    Acetaminophen Level <2 mg/L Final    Therapeutic range: 10-20 mg/L                Clinical Quality Measure: Blood Pressure Screening     Your blood pressure was checked while you were in the emergency department today. The last reading we obtained was  BP: (!) 146/97 . Please read the guidelines below about what these numbers mean and what you should do about them.  If your systolic blood pressure (the top number) is less than 120 and your diastolic blood pressure (the bottom number) is less than 80, then your blood pressure is normal. There is nothing more that you need to do about it.  If your systolic blood pressure (the top number) is 120-139 or your diastolic blood pressure (the bottom number) is 80-89, your blood pressure may be higher than it should be. You should have your blood pressure rechecked within a year by a primary care provider.  If your systolic blood pressure (the top number) is 140 or greater or your diastolic blood pressure (the bottom number) is 90 or greater, you may have high blood pressure. High blood pressure is treatable, but if left untreated over time it can put you at risk for heart attack, stroke, or kidney failure. You should have your blood pressure rechecked by a primary care provider within the next 4 weeks.  If your provider in the emergency department today gave you specific instructions to follow-up with your doctor or provider even sooner than that, you should follow that instruction and not wait for up to 4 weeks for your follow-up visit.        Thank you for choosing [facility]       Thank you for choosing [facility] for your care. Our goal is always to provide you with excellent care. Hearing back from our patients is one way we can continue to improve our services. Please take a few minutes to complete the written survey that you may receive in the mail after you visit with us. Thank you!        MyChart  Information     [facility]eni gives you secure access to your electronic health record. If you see a primary care provider, you can also send messages to your care team and make appointments. If you have questions, please call your primary care clinic.  If you do not have a primary care provider, please call [phone] and they will assist you.        Care EveryWhere ID     This is your Care EveryWhere ID. This could be used by other organizations to access your [facility] medical records  [id number]        Equal Access to Services     HARPREET MARCANO : Hadii sendy dobbso Soomaali, waaxda luqadaha, qaybta kaalmada adezari, jose alberto sanchez. So [facility] [phone].    ATENCIÓN: Si habla español, tiene a rivas disposición servicios gratuitos de asistencia lingüística. Jacyame al [phone].    We comply with applicable federal civil rights laws and [place] laws. We do not discriminate on the basis of race, color, national origin, age, disability, sex, sexual orientation, or gender identity.            After Visit Summary       This is your record. Keep this with you and show to your community pharmacist(s) and doctor(s) at your next visit.

## 2018-04-25 LAB
HAV IGM SERPL QL IA: NONREACTIVE
HBV CORE IGM SERPL QL IA: NONREACTIVE
HBV SURFACE AG SERPL QL IA: NONREACTIVE
HCV AB SERPL QL IA: NONREACTIVE

## 2018-04-26 NOTE — PROGRESS NOTES
REASON FOR CONSULTATION: elevated liver tests  REFERRING PROVIDER:    A/P  J Luis Wolf is a 78 year old male with elevated liver enzymes of unknown etiology 3 days ago. No repeat LFTs. Normal synthetic function. Will repeat LFTs and complete serologic workup and the US with dopplers. If this is unrevealing, will get liver biopsy. No obvious meds/events that could contribute. He does have other autoimmune disease and this may represent AIH.    RTC pending above.    Constanza Calderon MD  Hepatology/Liver Transplant  Medical Director, Liver Transplantation  [facility]  Subjective  J Luis Wolf is a 78 year old male referred for elevated liver tests. He was seeing his rheumatologist for RA and had labs drawn. ALT was 1417, AST 1494 and was sent to ED because of these labs. Bilirubin normal. US done without dopplers 4/24/18 unremkarable.    He takes acetaminophen daily 2-3 tabs. Most days he took 2 acetaminophen, This was pattern since October. He has stopped this since the elevated liver tests. Chronically on pred for RA and ILD (14 mg)Recently had an increase in prednisone by 1 mg because he had pneumonia. He was treated with azithromycin and a cephalosporin in February.      He does not drink regularly.  He was on lipitor and this was stopped a few days ago. He had not been on any new medications. He was taking a cough medication on occasion.    He feels about he same as the last year. No rash. No mouth sores. He does get easy bruising. No itching. No confusion. No dizziness or syncopal episodes.    Liver Function Studies -   Recent Labs   Lab Test  04/24/18   1628   PROTTOTAL  7.6   ALBUMIN  3.3*   BILITOTAL  0.4   ALKPHOS  188*   AST  1243*   ALT  1541*   INR 1    Platelets 221  Hgb 13.3  Creatinine 0.79    Evaluation done thus far  HCV neg  HBV neg      ETOH use: Less than 5 drinks per week. Was a heavier drinker but cut back in the 1960s.     Past Medical History:   Diagnosis Date      "Rheumatoid arthritis (H)    ILD    Social History     Social History     Marital status:      Spouse name: N/A     Number of children: N/A     Years of education: N/A     Occupational History     Not on file.     Social History Main Topics     Smoking status: Former Smoker     Packs/day: 0.50     Years: 5.00     Types: Cigarettes     Start date: 1957     Quit date: 1962     Smokeless tobacco: Never Used     Alcohol use 0.0 oz/week     0 Standard drinks or equivalent per week      Comment: Occasionally     Drug use: Not on file     Sexual activity: Not on file     Other Topics Concern     Not on file     Social History Narrative   Worked as a .    Family History   Problem Relation Age of Onset     Coronary Artery Disease Father      Age 77  age 80     Hyperlipidemia Father      Hypertension Mother       age 105   No liver disease in the family. NO   Bro has celiac  2 children A&W     ROS: 10 point ROS neg other than the symptoms noted above in the HPI.    /79 (BP Location: Right arm, Patient Position: Chair, Cuff Size: Adult Regular)  Pulse 96  Temp 98.6  F (37  C) (Oral)  Ht 1.702 m (5' 7\")  Wt 69.7 kg (153 lb 11.2 oz)  SpO2 94%  BMI 24.07 kg/m2    Constitutional: alert and no distress.   Neck: Neck supple. No adenopathy. Thyroid symmetric, normal size  HEENT:Normocephalic. No masses, lesions, tenderness or abnormalities. No temporal muscle wasting ENT exam normal, no neck nodes or sinus tenderness. No oral lesions  Cardiovascular: negative, No lifts, heaves, or thrills. RRR. No murmurs, clicks gallops or rub  Respiratory: negative, Good diaphragmatic excursion. Lungs clear. No wheezes or rales  Gastrointestinal: Abdomen soft, non-tender. BS normal.  No masses, organomegaly  Skin: no suspicious lesions or rashes. No spider angiomata or palmar erythema. Nails normal.  Neurologic: Alert and oriented x3. No asterixis or tremor. Gait normal.   Psychiatric:  " Appropriate, well groomed.  Hematologic/Lymphatic/Immunologic: Normal cervical and supraclavicular  lymph nodes

## 2018-04-27 ENCOUNTER — TELEPHONE (OUTPATIENT)
Dept: GASTROENTEROLOGY | Facility: CLINIC | Age: 78
End: 2018-04-27

## 2018-04-27 ENCOUNTER — OFFICE VISIT (OUTPATIENT)
Dept: GASTROENTEROLOGY | Facility: CLINIC | Age: 78
End: 2018-04-27
Attending: INTERNAL MEDICINE
Payer: COMMERCIAL

## 2018-04-27 VITALS
OXYGEN SATURATION: 94 % | DIASTOLIC BLOOD PRESSURE: 79 MMHG | BODY MASS INDEX: 24.12 KG/M2 | WEIGHT: 153.7 LBS | HEIGHT: 67 IN | HEART RATE: 96 BPM | SYSTOLIC BLOOD PRESSURE: 132 MMHG | TEMPERATURE: 98.6 F

## 2018-04-27 DIAGNOSIS — R74.8 ELEVATED LIVER ENZYMES: Primary | ICD-10-CM

## 2018-04-27 DIAGNOSIS — R74.8 ELEVATED LIVER ENZYMES: ICD-10-CM

## 2018-04-27 DIAGNOSIS — R94.5 ABNORMAL RESULTS OF LIVER FUNCTION STUDIES: Primary | ICD-10-CM

## 2018-04-27 LAB
ALBUMIN SERPL-MCNC: 3.4 G/DL (ref 3.4–5)
ALP SERPL-CCNC: 216 U/L (ref 40–150)
ALT SERPL W P-5'-P-CCNC: 1080 U/L (ref 0–70)
AST SERPL W P-5'-P-CCNC: 646 U/L (ref 0–45)
BILIRUB DIRECT SERPL-MCNC: 0.4 MG/DL (ref 0–0.2)
BILIRUB SERPL-MCNC: 0.7 MG/DL (ref 0.2–1.3)
CMV IGG SERPL QL IA: <0.2 AI (ref 0–0.8)
EBV VCA IGG SER QL IA: 6.8 AI (ref 0–0.8)
EBV VCA IGM SER QL IA: 0.4 AI (ref 0–0.8)
FERRITIN SERPL-MCNC: 1033 NG/ML (ref 26–388)
HCV RNA SERPL NAA+PROBE-ACNC: NORMAL [IU]/ML
HCV RNA SERPL NAA+PROBE-LOG IU: NORMAL LOG IU/ML
IRON SATN MFR SERPL: 30 % (ref 15–46)
IRON SERPL-MCNC: 75 UG/DL (ref 35–180)
PROT SERPL-MCNC: 7.3 G/DL (ref 6.8–8.8)
TIBC SERPL-MCNC: 246 UG/DL (ref 240–430)

## 2018-04-27 PROCEDURE — 36415 COLL VENOUS BLD VENIPUNCTURE: CPT | Performed by: INTERNAL MEDICINE

## 2018-04-27 PROCEDURE — 83516 IMMUNOASSAY NONANTIBODY: CPT | Performed by: INTERNAL MEDICINE

## 2018-04-27 PROCEDURE — 83540 ASSAY OF IRON: CPT | Performed by: INTERNAL MEDICINE

## 2018-04-27 PROCEDURE — 83550 IRON BINDING TEST: CPT | Performed by: INTERNAL MEDICINE

## 2018-04-27 PROCEDURE — 82728 ASSAY OF FERRITIN: CPT | Performed by: INTERNAL MEDICINE

## 2018-04-27 PROCEDURE — 86665 EPSTEIN-BARR CAPSID VCA: CPT | Mod: 91 | Performed by: INTERNAL MEDICINE

## 2018-04-27 PROCEDURE — 86644 CMV ANTIBODY: CPT | Performed by: INTERNAL MEDICINE

## 2018-04-27 PROCEDURE — 86038 ANTINUCLEAR ANTIBODIES: CPT | Performed by: INTERNAL MEDICINE

## 2018-04-27 PROCEDURE — 87799 DETECT AGENT NOS DNA QUANT: CPT | Performed by: INTERNAL MEDICINE

## 2018-04-27 PROCEDURE — 80076 HEPATIC FUNCTION PANEL: CPT | Performed by: INTERNAL MEDICINE

## 2018-04-27 PROCEDURE — G0463 HOSPITAL OUTPT CLINIC VISIT: HCPCS

## 2018-04-27 PROCEDURE — 86665 EPSTEIN-BARR CAPSID VCA: CPT | Performed by: INTERNAL MEDICINE

## 2018-04-27 ASSESSMENT — PAIN SCALES - GENERAL: PAINLEVEL: MILD PAIN (2)

## 2018-04-27 NOTE — NURSING NOTE
Chief Complaint   Patient presents with     Consult     Mr. Wolf is here today for a new liver consult.      Jossy Mc CMA

## 2018-04-27 NOTE — MR AVS SNAPSHOT
After Visit Summary   4/27/2018    J Luis Wolf    MRN: [id number]           Patient Information     Date Of Birth          1940        Visit Information        Provider Department      4/27/2018 9:00 AM Constanza Calderon MD [facility]        Today's Diagnoses     Elevated liver enzymes    -  1       Follow-ups after your visit        Your next 10 appointments already scheduled     Apr 27, 2018 10:15 AM CDT   Lab with  LAB   [facility] ([facility])    [address]  1st Floor  [facility] [id number] [phone]            May 01, 2018 11:00 AM CDT   CONSULT with Rh Pulmonary Rehab 1   [facility] ([facility])    [address]  [place] [id number] [phone]            May 02, 2018  8:30 AM CDT   US ABDOMEN/PELVIS DUPLEX COMPLETE with RSCCUS2   [facility] ([facility])    [address]  [place] [id number] [phone]           Please bring a list of your medicines (including vitamins, minerals and over-the-counter drugs). Also, tell your doctor about any allergies you may have. Wear comfortable clothes and leave your valuables at home.  Adults: No eating or drinking for 8 hours before the exam. You may take medicine with a small sip of water.  Children: - Children 6+ years: No food or drink for 6 hours before exam. - Children 1-5 years: No food or drink for 4 hours before exam. - Infants, breast-fed: may have breast milk up to 2 hours before exam. - Infants, formula: may have bottle until 4 hours before exam.  Please call the Imaging Department at your exam site with any questions.            May 09, 2018  3:30 PM CDT   FULL PULMONARY FUNCTION with  PFL C   [facility] Pulmonary Function Testing ([facility])    [address]  3rd Floor  [facility] [address] [phone]             May 09, 2018  4:30 PM CDT   (Arrive by 4:15 PM)   Return Interstitial Lung with Bill Kraft MD   [facility] ([facility])    [address]  [address]  [place] [address]   [phone]            Nov 06, 2018 10:00 AM CST   DX HIP/PELVIS/SPINE with UCDX1   [facility] ([facility])    [address]  1st Floor  [place] [id number] [phone]           Please do not take any of the following 24 hours prior to the day of your exam: vitamins, calcium tablets, antacids.  If possible, please wear clothes without metal (snaps, zippers). A sweatsuit works well.            Nov 06, 2018 10:30 AM CST   (Arrive by 10:15 AM)   Return Visit with Edu Roberts MD   [facility] ([facility])    [address]  [address]  [place] [id number] [phone]              Future tests that were ordered for you today     Open Future Orders        Priority Expected Expires Ordered    US Abdomen Complete w Doppler Complete Routine  4/27/2019 4/27/2018    CMV Antibody IgG Routine 4/27/2018 5/4/2018 4/26/2018    EBV Capsid Antibody IgG Routine 4/27/2018 5/4/2018 4/26/2018    EBV Capsid Antibody IgM Routine 4/27/2018 5/4/2018 4/26/2018    EBV DNA PCR Quantitative Whole Blood Routine 4/27/2018 5/4/2018 4/26/2018    Anti Nuclear Annemarie IgG by IFA with Reflex Routine 4/27/2018 5/4/2018 4/26/2018    Iron and iron binding capacity Routine 4/27/2018 5/4/2018 4/26/2018    Ferritin Routine 4/27/2018 5/4/2018 4/26/2018    F Actin EIA with reflex Routine 4/27/2018 5/4/2018 4/26/2018    Mitochondrial M2 Antibody IgG Routine 4/27/2018 5/4/2018 4/26/2018    CMV DNA quantification Routine 4/27/2018 5/4/2018 4/26/2018            Who to contact     If you have questions or need follow up information about today's clinic visit or your schedule please contact [facility] directly at [phone].  Normal or non-critical lab and imaging results will be communicated to you by MyChart, letter or phone within 4 business days after the clinic has received the results. If you do not hear from us within 7 days, please contact the clinic through [employer] or phone. If you have a critical or abnormal lab result, we will notify you by phone as soon as possible.  Submit refill requests through [employer] or call your pharmacy and they will forward the refill request to us. Please allow 3 business days for your refill to be completed.          Additional Information About Your Visit        [employer] Information     [employer] gives you secure access to your electronic health record. If you see a primary care provider, you can also send messages to your care team and make appointments. If you have questions, please call your primary care clinic.  If you do not have a primary care provider, please call [phone] and they will assist you.        Care EveryWhere ID     This is your Care EveryWhere ID. This could be used by other organizations to access your [place] medical records  [id number]        Your Vitals Were     Pulse Temperature Height Pulse Oximetry BMI (Body Mass Index)       96 98.6  F (37  C) (Oral) 1.702 m (5' 7\") 94% 24.07 kg/m2        Blood Pressure from Last 3 Encounters:   04/27/18 132/79   04/24/18 (!) 140/108   04/24/18 146/78    Weight from Last 3 Encounters:   04/27/18 69.7 kg (153 lb 11.2 oz)   04/24/18 70.1 kg (154 lb 9.6 oz)   11/21/17 70.6 kg (155 lb 9.6 oz)              We Performed the Following     Hepatic panel          Today's Medication Changes          These changes are accurate as of 4/27/18  9:54 AM.  If you have any questions, ask your nurse or doctor.               These medicines have changed or have updated prescriptions.        Dose/Directions    * predniSONE 1 MG tablet   Commonly known as:  DELTASONE   This may have changed:    - how much to take  - " how to take this  - when to take this  - additional instructions   Used for:  ILD (interstitial lung disease) (H)        Patient takes 14 mg daily take 4 1mg tabs by mouth daily with 1 10mg tab.   Quantity:  360 tablet   Refills:  3       * predniSONE 10 MG tablet   Commonly known as:  DELTASONE   This may have changed:  Another medication with the same name was changed. Make sure you understand how and when to take each.   Used for:  ILD (interstitial lung disease) (H)        Dose:  10 mg   Take 1 tablet (10 mg) by mouth daily Patient takes a total dose of 13mg daily   Quantity:  90 tablet   Refills:  3       * predniSONE 5 MG tablet   Commonly known as:  DELTASONE   This may have changed:  Another medication with the same name was changed. Make sure you understand how and when to take each.   Used for:  ILD (interstitial lung disease) (H)        Dose:  15 mg   Take 3 tablets (15 mg) by mouth daily   Quantity:  45 tablet   Refills:  3       * Notice:  This list has 3 medication(s) that are the same as other medications prescribed for you. Read the directions carefully, and ask your doctor or other care provider to review them with you.             Primary Care Provider Office Phone # Fax #    Mario Foster [phone]       [facility] [address] [facility] [address]        Equal Access to Services     HARPREET MARCANO AH: Hadbatool dobbso Soalfred, waaxda luqadaha, qaybta kaalmada adeegyada, jose alberto sanchez. So [facility] [phone].    ATENCIÓN: Si habla español, tiene a rivas disposición servicios gratuitos de asistencia lingüística. Llame al [phone].    We comply with applicable federal civil rights laws and [place] laws. We do not discriminate on the basis of race, color, national origin, age, disability, sex, sexual orientation, or gender identity.            Thank you!     Thank you for choosing [facility]PAT[facility]  for your care. Our goal is always to  provide you with excellent care. Hearing back from our patients is one way we can continue to improve our services. Please take a few minutes to complete the written survey that you may receive in the mail after your visit with us. Thank you!             Your Updated Medication List - Protect others around you: Learn how to safely use, store and throw away your medicines at www.disposemymeds.org.          This list is accurate as of 4/27/18  9:54 AM.  Always use your most recent med list.                   Brand Name Dispense Instructions for use Diagnosis    ADVIL PO      Take 200 mg by mouth        alendronate 70 MG tablet    FOSAMAX    12 tablet    Take 1 tablet (70 mg) by mouth every 7 days    ILD (interstitial lung disease) (H), Long-term use of immunosuppressant medication, Rheumatoid arthritis involving multiple sites with positive rheumatoid factor (H), Disorder of bone and cartilage, Osteoporosis without current pathological fracture, unspecified osteoporosis type       atorvastatin 10 MG tablet    LIPITOR     Take 5 mg by mouth every other day        azelastine 0.1 % spray    ASTELIN     Spray 1 spray into both nostrils 2 times daily        benzonatate 100 MG capsule    TESSALON    180 capsule    Take 1 capsule (100 mg) by mouth 3 times daily as needed for cough    ILD (interstitial lung disease) (H)       cetirizine 10 MG tablet    zyrTEC     Take 10 mg by mouth daily        D3-1000 PO      Take by mouth daily        DAILY MULTIVITAMIN PO      Take 2 tablets by mouth every morning        hydroxychloroquine 200 MG tablet    PLAQUENIL    180 tablet    Take 2 tablets (400 mg) by mouth daily    ILD (interstitial lung disease) (H), Long-term use of immunosuppressant medication, Rheumatoid arthritis involving multiple sites with positive rheumatoid factor (H), Disorder of bone and cartilage, Osteoporosis without current pathological fracture, unspecified osteoporosis type       losartan 25 MG tablet    COZAAR      Take 25 mg by mouth        MUCINEX DM PO           * predniSONE 1 MG tablet    DELTASONE    360 tablet    Patient takes 14 mg daily take 4 1mg tabs by mouth daily with 1 10mg tab.    ILD (interstitial lung disease) (H)       * predniSONE 10 MG tablet    DELTASONE    90 tablet    Take 1 tablet (10 mg) by mouth daily Patient takes a total dose of 13mg daily    ILD (interstitial lung disease) (H)       * predniSONE 5 MG tablet    DELTASONE    45 tablet    Take 3 tablets (15 mg) by mouth daily    ILD (interstitial lung disease) (H)       SM CALCIUM CITRATE+/VIT D3 PO      Take by mouth 2 times daily 630/500iu        sulfamethoxazole-trimethoprim 800-160 MG per tablet    BACTRIM DS/SEPTRA DS    36 tablet    Take 1 tablet by mouth Every Mon, Wed, Fri Morning 400-80    ILD (interstitial lung disease) (H)       TYLENOL PO      Take 325 mg by mouth as needed for mild pain or fever    ILD (interstitial lung disease) (H), Long-term use of immunosuppressant medication, Rheumatoid arthritis involving multiple sites with positive rheumatoid factor (H)       * Notice:  This list has 3 medication(s) that are the same as other medications prescribed for you. Read the directions carefully, and ask your doctor or other care provider to review them with you.

## 2018-04-27 NOTE — LETTER
4/27/2018       RE: J Luis Wolf  [address] [address]     Dear Colleague,    Thank you for referring your patient, J Luis Wolf, to the [facility] HEPATOLOGY at [facility]. Please see a copy of my visit note below.    REASON FOR CONSULTATION: elevated liver tests  REFERRING PROVIDER:    A/P  J Luis Wolf is a 78 year old male with elevated liver enzymes of unknown etiology 3 days ago. No repeat LFTs. Normal synthetic function. Will repeat LFTs and complete serologic workup and the US with dopplers. If this is unrevealing, will get liver biopsy. No obvious meds/events that could contribute. He does have other autoimmune disease and this may represent AIH.    RTC pending above.    Constanza Calderon MD  Hepatology/Liver Transplant  Medical Director, Liver Transplantation  [facility]  Subjective  J Luis Wolf is a 78 year old male referred for elevated liver tests. He was seeing his rheumatologist for RA and had labs drawn. ALT was 1417, AST 1494 and was sent to ED because of these labs. Bilirubin normal. US done without dopplers 4/24/18 unremkarable.    He takes acetaminophen daily 2-3 tabs. Most days he took 2 acetaminophen, This was pattern since October. He has stopped this since the elevated liver tests. Chronically on pred for RA and ILD (14 mg)Recently had an increase in prednisone by 1 mg because he had pneumonia. He was treated with azithromycin and a cephalosporin in February.      He does not drink regularly.  He was on lipitor and this was stopped a few days ago. He had not been on any new medications. He was taking a cough medication on occasion.    He feels about he same as the last year. No rash. No mouth sores. He does get easy bruising. No itching. No confusion. No dizziness or syncopal episodes.    Liver Function Studies -   Recent Labs   Lab Test  04/24/18   1628   PROTTOTAL  7.6   ALBUMIN  3.3*   BILITOTAL  0.4  "  ALKPHOS  188*   AST  1243*   ALT  1541*   INR 1    Platelets 221  Hgb 13.3  Creatinine 0.79    Evaluation done thus far  HCV neg  HBV neg      ETOH use: Less than 5 drinks per week. Was a heavier drinker but cut back in the 1960s.     Past Medical History:   Diagnosis Date     Rheumatoid arthritis (H)    ILD    Social History     Social History     Marital status:      Spouse name: N/A     Number of children: N/A     Years of education: N/A     Occupational History     Not on file.     Social History Main Topics     Smoking status: Former Smoker     Packs/day: 0.50     Years: 5.00     Types: Cigarettes     Start date: 1957     Quit date: 1962     Smokeless tobacco: Never Used     Alcohol use 0.0 oz/week     0 Standard drinks or equivalent per week      Comment: Occasionally     Drug use: Not on file     Sexual activity: Not on file     Other Topics Concern     Not on file     Social History Narrative   Worked as a .    Family History   Problem Relation Age of Onset     Coronary Artery Disease Father      Age 77  age 80     Hyperlipidemia Father      Hypertension Mother       age 105   No liver disease in the family. NO   Bro has celiac  2 children A&W     ROS: 10 point ROS neg other than the symptoms noted above in the HPI.    /79 (BP Location: Right arm, Patient Position: Chair, Cuff Size: Adult Regular)  Pulse 96  Temp 98.6  F (37  C) (Oral)  Ht 1.702 m (5' 7\")  Wt 69.7 kg (153 lb 11.2 oz)  SpO2 94%  BMI 24.07 kg/m2    Constitutional: alert and no distress.   Neck: Neck supple. No adenopathy. Thyroid symmetric, normal size  HEENT:Normocephalic. No masses, lesions, tenderness or abnormalities. No temporal muscle wasting ENT exam normal, no neck nodes or sinus tenderness. No oral lesions  Cardiovascular: negative, No lifts, heaves, or thrills. RRR. No murmurs, clicks gallops or rub  Respiratory: negative, Good diaphragmatic excursion. Lungs clear. No wheezes or " rales  Gastrointestinal: Abdomen soft, non-tender. BS normal.  No masses, organomegaly  Skin: no suspicious lesions or rashes. No spider angiomata or palmar erythema. Nails normal.  Neurologic: Alert and oriented x3. No asterixis or tremor. Gait normal.   Psychiatric:  Appropriate, well groomed.  Hematologic/Lymphatic/Immunologic: Normal cervical and supraclavicular  lymph nodes        Again, thank you for allowing me to participate in the care of your patient.      Sincerely,    Constanza Calderon MD

## 2018-04-27 NOTE — TELEPHONE ENCOUNTER
Instructed pt the following per Dr. Calderon: Let him know the liver tests are going in the right direction. They have dropped considerably. We will wait for the remainder of the tests I ordered and the ultrasound we discussed. Please recheck LFTs on Monday.

## 2018-04-28 LAB
CMV DNA SPEC NAA+PROBE-ACNC: NORMAL [IU]/ML
CMV DNA SPEC NAA+PROBE-LOG#: NORMAL {LOG_IU}/ML
SPECIMEN SOURCE: NORMAL

## 2018-04-29 LAB — SMA IGG SER-ACNC: 3 UNITS (ref 0–19)

## 2018-04-30 ENCOUNTER — HOSPITAL ENCOUNTER (OUTPATIENT)
Dept: LAB | Facility: CLINIC | Age: 78
Discharge: HOME | End: 2018-04-30
Attending: INTERNAL MEDICINE | Admitting: INTERNAL MEDICINE
Payer: COMMERCIAL

## 2018-04-30 ENCOUNTER — TELEPHONE (OUTPATIENT)
Dept: GASTROENTEROLOGY | Facility: CLINIC | Age: 78
End: 2018-04-30

## 2018-04-30 DIAGNOSIS — R94.5 ABNORMAL RESULTS OF LIVER FUNCTION STUDIES: ICD-10-CM

## 2018-04-30 LAB
ALBUMIN SERPL-MCNC: 3.3 G/DL (ref 3.4–5)
ALP SERPL-CCNC: 260 U/L (ref 40–150)
ALT SERPL W P-5'-P-CCNC: 1498 U/L (ref 0–70)
ANA SER QL IF: NEGATIVE
AST SERPL W P-5'-P-CCNC: 817 U/L (ref 0–45)
BILIRUB DIRECT SERPL-MCNC: 0.7 MG/DL (ref 0–0.2)
BILIRUB SERPL-MCNC: 0.9 MG/DL (ref 0.2–1.3)
PROT SERPL-MCNC: 7.5 G/DL (ref 6.8–8.8)

## 2018-04-30 PROCEDURE — 80076 HEPATIC FUNCTION PANEL: CPT | Performed by: INTERNAL MEDICINE

## 2018-04-30 PROCEDURE — 36415 COLL VENOUS BLD VENIPUNCTURE: CPT | Performed by: INTERNAL MEDICINE

## 2018-04-30 NOTE — TELEPHONE ENCOUNTER
DATE:  4/30/2018   TIME OF RECEIPT FROM LAB:  1:51     LAB TEST:  ALT 1498,   RESULTS GIVEN WITH READ-BACK TO (PROVIDER):  Bekah Hdz LPN for [facility]  TIME LAB VALUE REPORTED TO PROVIDER:   1:52

## 2018-05-01 ENCOUNTER — HOSPITAL ENCOUNTER (OUTPATIENT)
Dept: CARDIAC REHAB | Facility: CLINIC | Age: 78
End: 2018-05-01
Attending: INTERNAL MEDICINE
Payer: COMMERCIAL

## 2018-05-01 DIAGNOSIS — J84.9 ILD (INTERSTITIAL LUNG DISEASE) (H): ICD-10-CM

## 2018-05-01 LAB
EBV DNA # SPEC NAA+PROBE: NORMAL {COPIES}/ML
EBV DNA SPEC NAA+PROBE-LOG#: NORMAL {LOG_COPIES}/ML
MITOCHONDRIA M2 IGG SER-ACNC: 1 U/ML

## 2018-05-01 PROCEDURE — G0237 THERAPEUTIC PROCD STRG ENDUR: HCPCS

## 2018-05-01 PROCEDURE — 40000244 ZZH STATISTIC VISIT PULM REHAB

## 2018-05-01 PROCEDURE — G0238 OTH RESP PROC, INDIV: HCPCS

## 2018-05-01 RX ORDER — PREDNISONE 10 MG/1
10 TABLET ORAL DAILY
Qty: 90 TABLET | Refills: 3 | Status: SHIPPED | OUTPATIENT
Start: 2018-05-01 | End: 2019-01-01

## 2018-05-02 ENCOUNTER — HOSPITAL ENCOUNTER (OUTPATIENT)
Dept: ULTRASOUND IMAGING | Facility: CLINIC | Age: 78
Discharge: HOME | End: 2018-05-02
Attending: INTERNAL MEDICINE | Admitting: INTERNAL MEDICINE
Payer: COMMERCIAL

## 2018-05-02 ENCOUNTER — TELEPHONE (OUTPATIENT)
Dept: GASTROENTEROLOGY | Facility: CLINIC | Age: 78
End: 2018-05-02

## 2018-05-02 DIAGNOSIS — R74.8 ELEVATED LIVER ENZYMES: ICD-10-CM

## 2018-05-02 DIAGNOSIS — R79.89 ELEVATED LIVER FUNCTION TESTS: Primary | ICD-10-CM

## 2018-05-02 PROCEDURE — 76705 ECHO EXAM OF ABDOMEN: CPT | Mod: XS

## 2018-05-02 NOTE — TELEPHONE ENCOUNTER
Instructed pt the following per Dr. Calderon: His liver tests are more elevated. I do want him to get US with dopplers as ordered. Can you find out when this will get done. After that please arrange for liver biopsy. Random percutaneous liver biopsy for elevated liver tests.   Discussed the biopsy procedure, refraining from taking ASA 5 days prior to biopsy, needing a  home. Answered pt's questions. Pt will review his calendar and call to schedule the biopsy and let writer know the date.

## 2018-05-03 ENCOUNTER — HOSPITAL ENCOUNTER (OUTPATIENT)
Dept: CARDIAC REHAB | Facility: CLINIC | Age: 78
End: 2018-05-03
Attending: INTERNAL MEDICINE
Payer: COMMERCIAL

## 2018-05-03 DIAGNOSIS — R79.89 ELEVATED LFTS: Primary | ICD-10-CM

## 2018-05-03 PROCEDURE — G0239 OTH RESP PROC, GROUP: HCPCS

## 2018-05-03 PROCEDURE — 40000244 ZZH STATISTIC VISIT PULM REHAB

## 2018-05-07 ASSESSMENT — ENCOUNTER SYMPTOMS
SINUS CONGESTION: 0
SMELL DISTURBANCE: 0
NECK MASS: 0
SORE THROAT: 0
HOARSE VOICE: 1
TROUBLE SWALLOWING: 0
SINUS PAIN: 0
TASTE DISTURBANCE: 0

## 2018-05-08 ENCOUNTER — HOSPITAL ENCOUNTER (OUTPATIENT)
Dept: CARDIAC REHAB | Facility: CLINIC | Age: 78
End: 2018-05-08
Attending: INTERNAL MEDICINE
Payer: COMMERCIAL

## 2018-05-08 PROCEDURE — G0239 OTH RESP PROC, GROUP: HCPCS

## 2018-05-08 PROCEDURE — 40000244 ZZH STATISTIC VISIT PULM REHAB

## 2018-05-09 ENCOUNTER — ANESTHESIA EVENT (OUTPATIENT)
Dept: SURGERY | Facility: AMBULATORY SURGERY CENTER | Age: 78
End: 2018-05-09

## 2018-05-09 ENCOUNTER — OFFICE VISIT (OUTPATIENT)
Dept: PULMONOLOGY | Facility: CLINIC | Age: 78
End: 2018-05-09
Attending: INTERNAL MEDICINE
Payer: COMMERCIAL

## 2018-05-09 VITALS
OXYGEN SATURATION: 97 % | WEIGHT: 152 LBS | HEIGHT: 67 IN | HEART RATE: 85 BPM | DIASTOLIC BLOOD PRESSURE: 85 MMHG | SYSTOLIC BLOOD PRESSURE: 121 MMHG | BODY MASS INDEX: 23.86 KG/M2 | RESPIRATION RATE: 17 BRPM

## 2018-05-09 DIAGNOSIS — J84.9 INTERSTITIAL LUNG DISEASE (H): Primary | ICD-10-CM

## 2018-05-09 DIAGNOSIS — J84.9 ILD (INTERSTITIAL LUNG DISEASE) (H): ICD-10-CM

## 2018-05-09 PROCEDURE — G0463 HOSPITAL OUTPT CLINIC VISIT: HCPCS | Mod: ZF

## 2018-05-09 RX ORDER — CYCLOBENZAPRINE HCL 5 MG
5 TABLET ORAL 3 TIMES DAILY PRN
Qty: 30 TABLET | Refills: 0 | Status: SHIPPED | OUTPATIENT
Start: 2018-05-09 | End: 2018-05-10

## 2018-05-09 RX ORDER — PREDNISONE 5 MG/1
5 TABLET ORAL DAILY
Qty: 90 TABLET | Refills: 3 | Status: SHIPPED | OUTPATIENT
Start: 2018-05-09 | End: 2018-01-01

## 2018-05-09 ASSESSMENT — PAIN SCALES - GENERAL: PAINLEVEL: MILD PAIN (3)

## 2018-05-09 NOTE — LETTER
5/9/2018       RE: J Luis Wolf  [id number] [facility] [id number]     Dear Colleague,    Thank you for referring your patient, J Luis Wolf, to the [facility] FOR LUNG SCIENCE AND HEALTH at [facility]. Please see a copy of my visit note below.          [facility]   Pulmonary Clinic Follow Up Note  May 9, 2018      J Luis Wolf MRN# [id number]   Age: 78 year old YOB: 1940     Date of Consultation: May 9, 2018    Primary care provider: Mario Foster     History taken from; Patient       J Luis Wolf is a 78 year old male seen in the Pulmonary Clinic  for f/u.  Chief Complaint   Patient presents with     RECHECK     F/U RA/ILD   .          Pulmonary Problem List / Reason for follow up:   1. 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  The patient is slightly worse based on the pulmonary function test, although the patient's symptoms are unchanged.      Interstitial lung disease.  We will continue for now with 15 mg of the prednisone.  The patient is going to have pulmonary function test and 6-minute walk testing in 3 months.  We will do the echocardiogram also as a followup for the patient.  We will refill the patient's prednisone.  The patient was using Tylenol and NSAIDs for his back pain.  Now because of the recent liver injury we will order Flexeril for the patient until he can discuss with his liver specialist if he can take Tylenol or NSAIDs.      We explained the plan to the patient including the risks and benefits.  The patient expressed understanding and agreed with the plan.      Thank you very much for the opportunity to participate in the care of this very pleasant patient.         Return visit in 3 months      Bill  RAKESH Kraft         History of Present Illness / Interval History:     CHIEF COMPLAINT:  Interstitial lung disease associated with rheumatoid arthritis.      HISTORY OF PRESENT ILLNESS:  The patient has a longstanding interstitial lung disease associated with rheumatoid arthritis.  The patient is able to control his disease with low dose of the prednisone.  The patient is currently on 15 mg of prednisone and the patient is also on the Bactrim.  Currently the patient is feeling well and the patient did not notice any progression of disease; however, the patient's pulmonary function tests reveal mild decline in FVC.  The patient is currently also going through the evaluation for his hepatitis.  The patient has elevated liver enzymes which elevated in the 1000s and the patient is going to have a liver biopsy tomorrow.                 Pulmonary Data:       Exposure history: Asbestos;  No , TB;  No , Radiation;   No          Medications:     Current Outpatient Prescriptions   Medication Sig     alendronate (FOSAMAX) 70 MG tablet Take 1 tablet (70 mg) by mouth every 7 days     azelastine (ASTELIN) 137 MCG/SPRAY nasal spray [place] 1 spray into both nostrils 2 times daily     benzonatate (TESSALON) 100 MG capsule Take 1 capsule (100 mg) by mouth 3 times daily as needed for cough     Calcium Citrate-Vitamin D (SM CALCIUM CITRATE+/VIT D3 PO) Take by mouth 2 times daily 630/500iu     cetirizine (ZYRTEC) 10 MG tablet Take 10 mg by mouth daily     Cholecalciferol (D3-1000 PO) Take by mouth daily     cyclobenzaprine (FLEXERIL) 5 MG tablet Take 1 tablet (5 mg) by mouth 3 times daily as needed for muscle spasms     Dextromethorphan-Guaifenesin (MUCINEX DM PO)      hydroxychloroquine (PLAQUENIL) 200 MG tablet Take 2 tablets (400 mg) by mouth daily     Ibuprofen (ADVIL PO) Take 200 mg by mouth     losartan (COZAAR) 25 MG tablet Take 25 mg by mouth     Multiple Vitamin (DAILY MULTIVITAMIN PO) Take 2 tablets by mouth every morning      predniSONE (DELTASONE) 1 MG tablet Patient takes 14 mg daily take 4 1mg tabs by mouth daily with 1 10mg tab. (Patient taking differently: Take 15 mg by mouth daily Patient takes 14 mg daily take 4 1mg tabs by mouth daily with 1 10mg tab.)     predniSONE (DELTASONE) 10 MG tablet Take 1 tablet (10 mg) by mouth daily Patient takes a total dose of 13mg daily     predniSONE (DELTASONE) 5 MG tablet Take 3 tablets (15 mg) by mouth daily     predniSONE (DELTASONE) 5 MG tablet Take 1 tablet (5 mg) by mouth daily     sulfamethoxazole-trimethoprim (BACTRIM DS/SEPTRA DS) 800-160 MG per tablet Take 1 tablet by mouth Every Mon, Wed, Fri Morning 400-80     No current facility-administered medications for this visit.              Past Medical History:     Past Medical History:   Diagnosis Date     Rheumatoid arthritis (H)              Past Surgical History:     Past Surgical History:   Procedure Laterality Date     APPENDECTOMY       BACK SURGERY      Compression fx thoracic vertebra     COLONOSCOPY       ORTHOPEDIC SURGERY      Bunionectomy lt foot     SOFT TISSUE SURGERY      Bursitis rt hip cortisone injection 11 15 2015     THORACIC SURGERY  2012    Pulmonary fibrosis             Social History:     Social History     Social History     Marital status:      Spouse name: N/A     Number of children: N/A     Years of education: N/A     Occupational History     Not on file.     Social History Main Topics     Smoking status: Former Smoker     Packs/day: 0.50     Years: 5.00     Types: Cigarettes     Start date: 1957     Quit date: 1962     Smokeless tobacco: Never Used     Alcohol use No      Comment: Occasionally     Drug use: No     Sexual activity: Not on file     Other Topics Concern     Not on file     Social History Narrative             Family History:     Family History   Problem Relation Age of Onset     Coronary Artery Disease Father      Age 77  age 80     Hyperlipidemia Father       Hypertension Mother       age 105             Immunization:     There is no immunization history on file for this patient.           Review of Systems:   I have done 10 points of review systems and pertinent findings are as above, otherwise negative.             Physical Examination:   General: Alert, oriented, not in distress  B/P: 121/85, T: Data Unavailable, P: 85, R: 17  HEENT: neck supple, symmetrical, no lymph node enlargement, thyromegaly, bruit, JVD, pupils are isocoric and equally responsive to the light.   Lungs: both hemithoraces are symmetrical, normal to palpation, no dullness to percussion, auscultation of lungs revealed normal breathing sounds  CVS: Normal S1 and S2, no additional heart sounds, murmur, rub, normal peripheral pulses  Abdomen: Bowel sounds present, soft, non tender, non distended, no organomegaly, ascitis, mass  Extremities/musculoskeletal: no peripheral edema, deformity, cyanosis, clubbing  Neurology: alert, orientedx3, no motor/sensorial deficits, cranial nerves grossly normal  Skin: no rash  Psychiatry: Mood and affect are appropriate             DATA:     CBC RESULTS:     Recent Labs   Lab Test  18   1628  18   1138   WBC  7.3  10.1   RBC  4.40  4.44   HGB  13.3  13.5   HCT  42.4  43.2   PLT  221  207       Basic Metabolic Panel:  Recent Labs   Lab Test  18   1628  18   1138   NA  146*  137   POTASSIUM  4.8  4.1   CHLORIDE  107  99   CO2  33*  28   BUN  18  18   CR  0.79  0.81  0.81   GLC  106*  114*   SILVINO  9.3  9.3       INR  Recent Labs   Lab Test  18   1628   INR  1.00        PFT   PFT Latest Ref Rng & Units 2018   FVC L 1.17   FEV1 L 0.93   FVC% % 34   FEV1% % 36             Thank you for allowing me participate in the care of J Luis Wolf.    Bill Kraft M.D.  Associate Professor of Medicine  Pulmonary, Allergy, Critical Care and Sleep

## 2018-05-09 NOTE — PROGRESS NOTES
[facility]   Pulmonary Clinic Follow Up Note  May 9, 2018      J Luis Wolf MRN# [id number]   Age: 78 year old YOB: 1940     Date of Consultation: May 9, 2018    Primary care provider: Mario Foster     History taken from; Patient       J Luis Wolf is a 78 year old male seen in the Pulmonary Clinic  for f/u.  Chief Complaint   Patient presents with     RECHECK     F/U RA/ILD   .          Pulmonary Problem List / Reason for follow up:   1. 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  The patient is slightly worse based on the pulmonary function test, although the patient's symptoms are unchanged.      Interstitial lung disease.  We will continue for now with 15 mg of the prednisone.  The patient is going to have pulmonary function test and 6-minute walk testing in 3 months.  We will do the echocardiogram also as a followup for the patient.  We will refill the patient's prednisone.  The patient was using Tylenol and NSAIDs for his back pain.  Now because of the recent liver injury we will order Flexeril for the patient until he can discuss with his liver specialist if he can take Tylenol or NSAIDs.      We explained the plan to the patient including the risks and benefits.  The patient expressed understanding and agreed with the plan.      Thank you very much for the opportunity to participate in the care of this very pleasant patient.         Return visit in 3 months      Bill Kraft M.D.         History of Present Illness / Interval History:     CHIEF COMPLAINT:  Interstitial lung disease associated with rheumatoid arthritis.      HISTORY OF PRESENT ILLNESS:  The patient has a longstanding interstitial lung disease associated with rheumatoid arthritis.  The patient is able to  control his disease with low dose of the prednisone.  The patient is currently on 15 mg of prednisone and the patient is also on the Bactrim.  Currently the patient is feeling well and the patient did not notice any progression of disease; however, the patient's pulmonary function tests reveal mild decline in FVC.  The patient is currently also going through the evaluation for his hepatitis.  The patient has elevated liver enzymes which elevated in the 1000s and the patient is going to have a liver biopsy tomorrow.                 Pulmonary Data:       Exposure history: Asbestos;  No , TB;  No , Radiation;   No          Medications:     Current Outpatient Prescriptions   Medication Sig     alendronate (FOSAMAX) 70 MG tablet Take 1 tablet (70 mg) by mouth every 7 days     azelastine (ASTELIN) 137 MCG/SPRAY nasal spray [place] 1 spray into both nostrils 2 times daily     benzonatate (TESSALON) 100 MG capsule Take 1 capsule (100 mg) by mouth 3 times daily as needed for cough     Calcium Citrate-Vitamin D (SM CALCIUM CITRATE+/VIT D3 PO) Take by mouth 2 times daily 630/500iu     cetirizine (ZYRTEC) 10 MG tablet Take 10 mg by mouth daily     Cholecalciferol (D3-1000 PO) Take by mouth daily     cyclobenzaprine (FLEXERIL) 5 MG tablet Take 1 tablet (5 mg) by mouth 3 times daily as needed for muscle spasms     Dextromethorphan-Guaifenesin (MUCINEX DM PO)      hydroxychloroquine (PLAQUENIL) 200 MG tablet Take 2 tablets (400 mg) by mouth daily     Ibuprofen (ADVIL PO) Take 200 mg by mouth     losartan (COZAAR) 25 MG tablet Take 25 mg by mouth     Multiple Vitamin (DAILY MULTIVITAMIN PO) Take 2 tablets by mouth every morning     predniSONE (DELTASONE) 1 MG tablet Patient takes 14 mg daily take 4 1mg tabs by mouth daily with 1 10mg tab. (Patient taking differently: Take 15 mg by mouth daily Patient takes 14 mg daily take 4 1mg tabs by mouth daily with 1 10mg tab.)     predniSONE (DELTASONE) 10 MG tablet Take 1 tablet (10 mg) by  mouth daily Patient takes a total dose of 13mg daily     predniSONE (DELTASONE) 5 MG tablet Take 3 tablets (15 mg) by mouth daily     predniSONE (DELTASONE) 5 MG tablet Take 1 tablet (5 mg) by mouth daily     sulfamethoxazole-trimethoprim (BACTRIM DS/SEPTRA DS) 800-160 MG per tablet Take 1 tablet by mouth Every Mon, Wed, Fri Morning 400-80     No current facility-administered medications for this visit.              Past Medical History:     Past Medical History:   Diagnosis Date     Rheumatoid arthritis (H)              Past Surgical History:     Past Surgical History:   Procedure Laterality Date     APPENDECTOMY       BACK SURGERY      Compression fx thoracic vertebra     COLONOSCOPY       ORTHOPEDIC SURGERY      Bunionectomy lt foot     SOFT TISSUE SURGERY      Bursitis rt hip cortisone injection 11 15 2015     THORACIC SURGERY  2012    Pulmonary fibrosis             Social History:     Social History     Social History     Marital status:      Spouse name: N/A     Number of children: N/A     Years of education: N/A     Occupational History     Not on file.     Social History Main Topics     Smoking status: Former Smoker     Packs/day: 0.50     Years: 5.00     Types: Cigarettes     Start date: 1957     Quit date: 1962     Smokeless tobacco: Never Used     Alcohol use No      Comment: Occasionally     Drug use: No     Sexual activity: Not on file     Other Topics Concern     Not on file     Social History Narrative             Family History:     Family History   Problem Relation Age of Onset     Coronary Artery Disease Father      Age 77  age 80     Hyperlipidemia Father      Hypertension Mother       age 105             Immunization:     There is no immunization history on file for this patient.           Review of Systems:   I have done 10 points of review systems and pertinent findings are as above, otherwise negative.             Physical Examination:   General:  Alert, oriented, not in distress  B/P: 121/85, T: Data Unavailable, P: 85, R: 17  HEENT: neck supple, symmetrical, no lymph node enlargement, thyromegaly, bruit, JVD, pupils are isocoric and equally responsive to the light.   Lungs: both hemithoraces are symmetrical, normal to palpation, no dullness to percussion, auscultation of lungs revealed normal breathing sounds  CVS: Normal S1 and S2, no additional heart sounds, murmur, rub, normal peripheral pulses  Abdomen: Bowel sounds present, soft, non tender, non distended, no organomegaly, ascitis, mass  Extremities/musculoskeletal: no peripheral edema, deformity, cyanosis, clubbing  Neurology: alert, orientedx3, no motor/sensorial deficits, cranial nerves grossly normal  Skin: no rash  Psychiatry: Mood and affect are appropriate             DATA:     CBC RESULTS:     Recent Labs   Lab Test  04/24/18   1628  04/24/18   1138   WBC  7.3  10.1   RBC  4.40  4.44   HGB  13.3  13.5   HCT  42.4  43.2   PLT  221  207       Basic Metabolic Panel:  Recent Labs   Lab Test  04/24/18   1628  04/24/18   1138   NA  146*  137   POTASSIUM  4.8  4.1   CHLORIDE  107  99   CO2  33*  28   BUN  18  18   CR  0.79  0.81  0.81   GLC  106*  114*   SILVINO  9.3  9.3       INR  Recent Labs   Lab Test  04/24/18   1628   INR  1.00        PFT   PFT Latest Ref Rng & Units 5/9/2018   FVC L 1.17   FEV1 L 0.93   FVC% % 34   FEV1% % 36             Thank you for allowing me participate in the care of J Luis Wolf.    Bill Kraft M.D.  Associate Professor of Medicine  Pulmonary, Allergy, Critical Care and Sleep      Answers for HPI/ROS submitted by the patient on 5/7/2018   General Symptoms: No  Skin Symptoms: No  HENT Symptoms: No  EYE SYMPTOMS: No  HEART SYMPTOMS: No  LUNG SYMPTOMS: No  INTESTINAL SYMPTOMS: No  URINARY SYMPTOMS: No  REPRODUCTIVE SYMPTOMS: No  SKELETAL SYMPTOMS: No  BLOOD SYMPTOMS: No  NERVOUS SYSTEM SYMPTOMS: No  MENTAL HEALTH SYMPTOMS: No  Ear pain: No  Ear discharge: No  Hearing loss:  No  Tinnitus: No  Nosebleeds: No  Congestion: No  Sinus pain: No  Trouble swallowing: No   Voice hoarseness: Yes  Mouth sores: No  Sore throat: No  Tooth pain: No  Gum tenderness: No  Bleeding gums: No  Change in taste: No  Change in sense of smell: No  Dry mouth: No  Hearing aid used: No  Neck lump: No

## 2018-05-09 NOTE — MR AVS SNAPSHOT
After Visit Summary   5/9/2018    J [place] Wolf    MRN: [id number]           Patient Information     Date Of Birth          1940        Visit Information        Provider Department      5/9/2018 4:30 PM Bill Kraft MD [facility]        Today's Diagnoses     ILD (interstitial lung disease) (H)          Care Instructions    Patient is instructed to call if there is any changes in symptoms.          Follow-ups after your visit        Follow-up notes from your care team     Return in about 3 months (around 8/9/2018).      Your next 10 appointments already scheduled     May 10, 2018  8:15 AM CDT   (Arrive by 6:45 AM)   IR LIVER BIOPSY PERCUTANEOUS with [id number]   [facility] ([facility])    [address] [facility]  5th [facility] [address] [phone]           1. Laboratory test are to be obtained by your doctor prior to the exam (CBCP, INR and PTT) 2. Someone will need to drive you to and from the hospital. 3. If you are or may be pregnant, contact your doctor or a Radiology nurse prior to the day of the exam. 4. If you have diabetes, check with your doctor or a Radiology nurse to see if your insulin needs to be adjusted for the exam. 5. If you are taking Coumadin (to thin you blood) please contact your doctor or a Radiology nurse at least 3 days before the exam for special instructions. 6. The day before your exam you may eat your regular diet and are encouraged to drink at least 2 quarts of clear liquids. Drink no alcoholic beverages for 24 hours prior to the exam. 7. Do not eat any solid food or milk products for 6 hours prior to the exam. You may drink clear liquids until 2 hours prior to the exam. Clear liquids include the following: water, Jell-O, clear broth, apple juice or any noncarbonated drink that you can see through (no pop!) 8. The morning of the exam you may brush your teeth and take medications as  directed with a sip of water. 9. Tell the Radiology nurse if you have any allergies. 10. You will be asked to empty your bladder before the exam begins. 11. You may resume your normal activities the day after the exam. Do not do any strenuous exercises or lifting for 48 hours. 12. If a drainage tube is to remain in place, your doctor s office will need to make arrangements for you to learn how to care for this tube. Ask them about this before the date of the exam. You may need to obtain supplies from your local pharmacy. Please make an appointment before leaving your current appointment. You should understand the plan for your care prior to leaving this appointment            May 10, 2018   Procedure with Raymon Gann PA-C   [facility] ([facility])    [address] [place] [address]  5th [place] [id number]   [phone]           Located in the Clinics and Surgery Center at [address] [place] [address].   parking is very convenient and highly recommended.  is a $6 flat rate fee.  Both  and self parkers should enter the main arrival plaza from [facility]; parking attendants will direct you based on your parking preference.            May 15, 2018  9:45 AM CDT   Pulmonary Treatment with Rh Pulmonary Rehab 1   [facility] ([facility])    [address] [place] [address], [address]  [facility] [address]   [phone]            May 17, 2018  9:45 AM CDT   Pulmonary Treatment with Rh Pulmonary Rehab [address] [place] [address] ([facility])    [address], [address]  [facility] [address]   [phone]            May 31, 2018  1:00 PM CDT   Pulmonary Treatment with [facility] Pulmonary Re[facility] [address] [place] [address] ([facility])    [address], [address]  [facility] [address]   [phone]            Jun 05, 2018  1:00 PM CDT   Pulmonary Treatment  with Rh Pulmonary Rehab 1   [facility] ([facility])    [address], [address]  [facility] [address]   [phone]            Jun 18, 2018 11:00 AM CDT   Pulmonary Treatment with Rh Pulmonary Rehab 2   [facility] ([facility])    [address], [address]  [facility] [address]   [phone]            Aug 22, 2018 11:00 AM CDT   Six Minute Walk with UC PFL 6 MINUTE WALK 1   [facility] ([facility])    [address]  3rd Floor  [facility] [address]   [phone]            Aug 22, 2018 12:00 PM CDT   FULL PULMONARY FUNCTION with UC PFL A   [facility] ([facility])    [address]  3rd Floor  [facility] [address]   [phone]            Aug 22, 2018  1:00 PM CDT   (Arrive by 12:45 PM)   Return Interstitial Lung with Bill Kraft MD   [facility] ([facility])    [address]  [address]  [facility] [address] [phone]              Future tests that were ordered for you today     Open Future Orders        Priority Expected Expires Ordered    General PFT Lab (Please always keep checked) Routine  5/9/2019 5/9/2018    Pulmonary Function Test Routine  5/9/2019 5/9/2018    6 minute walk test Routine  5/9/2019 5/9/2018    Echocardiogram Complete Routine  5/9/2019 5/9/2018            Who to contact     If you have questions or need follow up information about today's clinic visit or your schedule please contact [facility] directly at [phone].  Normal or non-critical lab and imaging results will be communicated to you by MyChart, letter or phone within 4 business days after the clinic has received the results. If you do not hear from us within 7 days, please contact the clinic through MyChart or phone. If you  "have a critical or abnormal lab result, we will notify you by phone as soon as possible.  Submit refill requests through [employer] or call your pharmacy and they will forward the refill request to us. Please allow 3 business days for your refill to be completed.          Additional Information About Your Visit        [employer] Information     [employer] gives you secure access to your electronic health record. If you see a primary care provider, you can also send messages to your care team and make appointments. If you have questions, please call your primary care clinic.  If you do not have a primary care provider, please call [phone] and they will assist you.        Care EveryWhere ID     This is your Care EveryWhere ID. This could be used by other organizations to access your [place] medical records  [id number]        Your Vitals Were     Pulse Respirations Height Pulse Oximetry BMI (Body Mass Index)       85 17 1.702 m (5' 7\") 97% 23.81 kg/m2        Blood Pressure from Last 3 Encounters:   05/09/18 121/85   04/27/18 132/79   04/24/18 (!) 140/108    Weight from Last 3 Encounters:   05/09/18 68.9 kg (152 lb)   04/27/18 69.7 kg (153 lb 11.2 oz)   04/24/18 70.1 kg (154 lb 9.6 oz)                 Today's Medication Changes          These changes are accurate as of 5/9/18  5:11 PM.  If you have any questions, ask your nurse or doctor.               Start taking these medicines.        Dose/Directions    cyclobenzaprine 5 MG tablet   Commonly known as:  FLEXERIL   Used for:  ILD (interstitial lung disease) (H)   Started by:  Bill Kraft MD        Dose:  5 mg   Take 1 tablet (5 mg) by mouth 3 times daily as needed for muscle spasms   Quantity:  30 tablet   Refills:  0         These medicines have changed or have updated prescriptions.        Dose/Directions    * predniSONE 1 MG tablet   Commonly known as:  DELTASONE   This may have changed:    - how much to take  - how to take this  - when to take this  - additional " instructions   Used for:  ILD (interstitial lung disease) (H)        Patient takes 14 mg daily take 4 1mg tabs by mouth daily with 1 10mg tab.   Quantity:  360 tablet   Refills:  3       * predniSONE 5 MG tablet   Commonly known as:  DELTASONE   This may have changed:    - Another medication with the same name was added. Make sure you understand how and when to take each.  - Another medication with the same name was changed. Make sure you understand how and when to take each.   Used for:  ILD (interstitial lung disease) (H)   Changed by:  Bill Kraft MD        Dose:  15 mg   Take 3 tablets (15 mg) by mouth daily   Quantity:  45 tablet   Refills:  3       * predniSONE 10 MG tablet   Commonly known as:  DELTASONE   This may have changed:    - Another medication with the same name was added. Make sure you understand how and when to take each.  - Another medication with the same name was changed. Make sure you understand how and when to take each.   Used for:  ILD (interstitial lung disease) (H)   Changed by:  Bill Kraft MD        Dose:  10 mg   Take 1 tablet (10 mg) by mouth daily Patient takes a total dose of 13mg daily   Quantity:  90 tablet   Refills:  3       * predniSONE 5 MG tablet   Commonly known as:  DELTASONE   This may have changed:  You were already taking a medication with the same name, and this prescription was added. Make sure you understand how and when to take each.   Used for:  ILD (interstitial lung disease) (H)   Changed by:  Bill Kraft MD        Dose:  5 mg   Take 1 tablet (5 mg) by mouth daily   Quantity:  90 tablet   Refills:  3       * Notice:  This list has 4 medication(s) that are the same as other medications prescribed for you. Read the directions carefully, and ask your doctor or other care provider to review them with you.         Where to get your medicines      These medications were sent to [facility] [address] [place] [address] [address] AT [place] [address] [address]  [address] [facility] [address], [facility] [address]     Phone:  [phone]     cyclobenzaprine 5 MG tablet    predniSONE 5 MG tablet                Primary Care Provider Office Phone # Fax #    Mario Foster [phone]       [facility] [address] [place] [address]        Equal Access to Services     [facility]: Hadii aad ku hadasho Soomaali, waaxda luqadaha, qaybta kaalmada adeegyada, waxay idiin hayaan adeeg kharash la'aan . So [facility] [phone].    ATENCIÓN: Si habla español, tiene a rivas disposición servicios gratuitos de asistencia lingüística. [facility] [phone].    We comply with applicable federal civil rights laws and [place] laws. We do not discriminate on the basis of race, color, national origin, age, disability, sex, sexual orientation, or gender identity.            Thank you!     Thank you for choosing [facility]  for your care. Our goal is always to provide you with excellent care. Hearing back from our patients is one way we can continue to improve our services. Please take a few minutes to complete the written survey that you may receive in the mail after your visit with us. Thank you!             Your Updated Medication List - Protect others around you: Learn how to safely use, store and throw away your medicines at www.disposemymeds.org.          This list is accurate as of 5/9/18  5:11 PM.  Always use your most recent med list.                   Brand Name Dispense Instructions for use Diagnosis    ADVIL PO      Take 200 mg by mouth        alendronate 70 MG tablet    FOSAMAX    12 tablet    Take 1 tablet (70 mg) by mouth every 7 days    ILD (interstitial lung disease) (H), Long-term use of immunosuppressant medication, Rheumatoid arthritis involving multiple sites with positive rheumatoid factor (H), Disorder of bone and cartilage, Osteoporosis without current pathological fracture, unspecified osteoporosis type        azelastine 0.1 % spray    ASTELIN     Spray 1 spray into both nostrils 2 times daily        benzonatate 100 MG capsule    TESSALON    180 capsule    Take 1 capsule (100 mg) by mouth 3 times daily as needed for cough    ILD (interstitial lung disease) (H)       cetirizine 10 MG tablet    zyrTEC     Take 10 mg by mouth daily        cyclobenzaprine 5 MG tablet    FLEXERIL    30 tablet    Take 1 tablet (5 mg) by mouth 3 times daily as needed for muscle spasms    ILD (interstitial lung disease) (H)       D3-1000 PO      Take by mouth daily        DAILY MULTIVITAMIN PO      Take 2 tablets by mouth every morning        hydroxychloroquine 200 MG tablet    PLAQUENIL    180 tablet    Take 2 tablets (400 mg) by mouth daily    ILD (interstitial lung disease) (H), Long-term use of immunosuppressant medication, Rheumatoid arthritis involving multiple sites with positive rheumatoid factor (H), Disorder of bone and cartilage, Osteoporosis without current pathological fracture, unspecified osteoporosis type       losartan 25 MG tablet    COZAAR     Take 25 mg by mouth        MUCINEX DM PO           * predniSONE 1 MG tablet    DELTASONE    360 tablet    Patient takes 14 mg daily take 4 1mg tabs by mouth daily with 1 10mg tab.    ILD (interstitial lung disease) (H)       * predniSONE 5 MG tablet    DELTASONE    45 tablet    Take 3 tablets (15 mg) by mouth daily    ILD (interstitial lung disease) (H)       * predniSONE 10 MG tablet    DELTASONE    90 tablet    Take 1 tablet (10 mg) by mouth daily Patient takes a total dose of 13mg daily    ILD (interstitial lung disease) (H)       * predniSONE 5 MG tablet    DELTASONE    90 tablet    Take 1 tablet (5 mg) by mouth daily    ILD (interstitial lung disease) (H)       SM CALCIUM CITRATE+/VIT D3 PO      Take by mouth 2 times daily 630/500iu        sulfamethoxazole-trimethoprim 800-160 MG per tablet    BACTRIM DS/SEPTRA DS    36 tablet    Take 1 tablet by mouth Every Mon, Wed, Fri Morning  400-80    ILD (interstitial lung disease) (H)       * Notice:  This list has 4 medication(s) that are the same as other medications prescribed for you. Read the directions carefully, and ask your doctor or other care provider to review them with you.

## 2018-05-10 ENCOUNTER — RADIANT APPOINTMENT (OUTPATIENT)
Dept: RADIOLOGY | Facility: AMBULATORY SURGERY CENTER | Age: 78
End: 2018-05-10
Attending: INTERNAL MEDICINE
Payer: COMMERCIAL

## 2018-05-10 ENCOUNTER — ANESTHESIA (OUTPATIENT)
Dept: SURGERY | Facility: AMBULATORY SURGERY CENTER | Age: 78
End: 2018-05-10

## 2018-05-10 ENCOUNTER — HOSPITAL ENCOUNTER (OUTPATIENT)
Facility: AMBULATORY SURGERY CENTER | Age: 78
End: 2018-05-10
Attending: PHYSICIAN ASSISTANT
Payer: COMMERCIAL

## 2018-05-10 ENCOUNTER — SURGERY (OUTPATIENT)
Age: 78
End: 2018-05-10

## 2018-05-10 VITALS
RESPIRATION RATE: 16 BRPM | DIASTOLIC BLOOD PRESSURE: 94 MMHG | HEIGHT: 67 IN | BODY MASS INDEX: 23.86 KG/M2 | SYSTOLIC BLOOD PRESSURE: 142 MMHG | HEART RATE: 86 BPM | OXYGEN SATURATION: 93 % | TEMPERATURE: 97.9 F | WEIGHT: 152 LBS

## 2018-05-10 DIAGNOSIS — M54.9 BACK PAIN: Primary | ICD-10-CM

## 2018-05-10 DIAGNOSIS — J84.9 ILD (INTERSTITIAL LUNG DISEASE) (H): ICD-10-CM

## 2018-05-10 DIAGNOSIS — R79.89 ELEVATED LFTS: ICD-10-CM

## 2018-05-10 RX ORDER — ONDANSETRON 2 MG/ML
4 INJECTION INTRAMUSCULAR; INTRAVENOUS EVERY 30 MIN PRN
Status: DISCONTINUED | OUTPATIENT
Start: 2018-05-10 | End: 2018-05-11 | Stop reason: HOSPADM

## 2018-05-10 RX ORDER — NALOXONE HYDROCHLORIDE 0.4 MG/ML
.1-.4 INJECTION, SOLUTION INTRAMUSCULAR; INTRAVENOUS; SUBCUTANEOUS
Status: DISCONTINUED | OUTPATIENT
Start: 2018-05-10 | End: 2018-05-11 | Stop reason: HOSPADM

## 2018-05-10 RX ORDER — FENTANYL CITRATE 50 UG/ML
25-50 INJECTION, SOLUTION INTRAMUSCULAR; INTRAVENOUS EVERY 5 MIN PRN
Status: DISCONTINUED | OUTPATIENT
Start: 2018-05-10 | End: 2018-05-11 | Stop reason: HOSPADM

## 2018-05-10 RX ORDER — ACETAMINOPHEN 325 MG/1
975 TABLET ORAL ONCE
Status: DISCONTINUED | OUTPATIENT
Start: 2018-05-10 | End: 2018-05-11 | Stop reason: HOSPADM

## 2018-05-10 RX ORDER — SODIUM CHLORIDE, SODIUM LACTATE, POTASSIUM CHLORIDE, CALCIUM CHLORIDE 600; 310; 30; 20 MG/100ML; MG/100ML; MG/100ML; MG/100ML
INJECTION, SOLUTION INTRAVENOUS CONTINUOUS
Status: DISCONTINUED | OUTPATIENT
Start: 2018-05-10 | End: 2018-05-11 | Stop reason: HOSPADM

## 2018-05-10 RX ORDER — GABAPENTIN 300 MG/1
300 CAPSULE ORAL ONCE
Status: COMPLETED | OUTPATIENT
Start: 2018-05-10 | End: 2018-05-10

## 2018-05-10 RX ORDER — ONDANSETRON 4 MG/1
4 TABLET, ORALLY DISINTEGRATING ORAL EVERY 30 MIN PRN
Status: DISCONTINUED | OUTPATIENT
Start: 2018-05-10 | End: 2018-05-11 | Stop reason: HOSPADM

## 2018-05-10 RX ORDER — MEPERIDINE HYDROCHLORIDE 25 MG/ML
12.5 INJECTION INTRAMUSCULAR; INTRAVENOUS; SUBCUTANEOUS
Status: DISCONTINUED | OUTPATIENT
Start: 2018-05-10 | End: 2018-05-11 | Stop reason: HOSPADM

## 2018-05-10 RX ORDER — SODIUM CHLORIDE, SODIUM LACTATE, POTASSIUM CHLORIDE, CALCIUM CHLORIDE 600; 310; 30; 20 MG/100ML; MG/100ML; MG/100ML; MG/100ML
INJECTION, SOLUTION INTRAVENOUS CONTINUOUS PRN
Status: DISCONTINUED | OUTPATIENT
Start: 2018-05-10 | End: 2018-05-10

## 2018-05-10 RX ORDER — CYCLOBENZAPRINE HCL 5 MG
5 TABLET ORAL 3 TIMES DAILY PRN
Qty: 30 TABLET | Refills: 0 | Status: SHIPPED | OUTPATIENT
Start: 2018-05-10 | End: 2018-08-22

## 2018-05-10 RX ORDER — SODIUM CHLORIDE 9 MG/ML
INJECTION, SOLUTION INTRAVENOUS CONTINUOUS
Status: DISCONTINUED | OUTPATIENT
Start: 2018-05-10 | End: 2018-05-11 | Stop reason: HOSPADM

## 2018-05-10 RX ADMIN — SODIUM CHLORIDE, SODIUM LACTATE, POTASSIUM CHLORIDE, CALCIUM CHLORIDE: 600; 310; 30; 20 INJECTION, SOLUTION INTRAVENOUS at 08:58

## 2018-05-10 RX ADMIN — GABAPENTIN 300 MG: 300 CAPSULE ORAL at 07:59

## 2018-05-10 RX ADMIN — Medication 12 ML: at 09:57

## 2018-05-10 ASSESSMENT — LIFESTYLE VARIABLES: TOBACCO_USE: 1

## 2018-05-10 NOTE — ANESTHESIA POSTPROCEDURE EVALUATION
Patient: J Luis Wolf    Procedure(s):  Percutaneous Liver Biopsy - Wound Class: I-Clean    Diagnosis:Elevated LFTs  Diagnosis Additional Information: No value filed.    Anesthesia Type:  Other, MAC    Note:  Anesthesia Post Evaluation    Patient location during evaluation: Phase 2  Patient participation: Able to fully participate in evaluation  Level of consciousness: awake and alert  Pain management: adequate  Airway patency: patent  Cardiovascular status: acceptable  Respiratory status: acceptable and nasal cannula  Hydration status: acceptable  PONV: none     Anesthetic complications: None    Comments: Patient is on home O2        Last vitals:  Vitals:    05/10/18 1100 05/10/18 1130 05/10/18 1202   BP: 131/77 115/75 (!) 142/94   Pulse: 88 85 86   Resp: 14 14 16   Temp:      SpO2: 96% 94% 93%         Electronically Signed By: Kaiser Roy DO  May 10, 2018  1:22 PM

## 2018-05-10 NOTE — OR NURSING
Patient on bedrest x 2 hours.  No signs of bleeding.  Vital signs stable.  Patient discharged on NC 2L, per home use.  Reviewed discharge instructions.

## 2018-05-10 NOTE — BRIEF OP NOTE
Interventional Radiology Brief Post Procedure Note    Procedure: Random liver biopsy    Proceduralist: Gregor Cotto PA-C    Assistant: DAWSON Pruitt    Time Out: Prior to the start of the procedure and with procedural staff participation, I verbally confirmed the patient s identity using two indicators, relevant allergies, that the procedure was appropriate and matched the consent or emergent situation, and that the correct equipment/implants were available. Immediately prior to starting the procedure I conducted the Time Out with the procedural staff and re-confirmed the patient s name, procedure, and site/side. (The Joint Commission universal protocol was followed.)  Yes        Sedation: Monitored Anesthesia Care (MAC) administered and documented by Anesthesia Care Provider    Findings: Ultrasound guided random liver biopsy. Performed using an intercostal approach.    Estimated Blood Loss: Less than 10 ml    Fluoroscopy Time: 0 minute(s)    SPECIMENS: None    Complications: 1. None     Condition: Stable    Plan: Follow up per primary team.     Comments: See dictated procedure note for full details.    Raymon Gann PA-C

## 2018-05-10 NOTE — IP AVS SNAPSHOT
[facility]    [address] [place] [address]    Phone:  [phone]    Fax:  [phone]                                       After Visit Summary   5/10/2018    J [place] Wolf    MRN: [id number]           After Visit Summary Signature Page     I have received my discharge instructions, and my questions have been answered. I have discussed any challenges I see with this plan with the nurse or doctor.    ..........................................................................................................................................  Patient/Patient Representative Signature      ..........................................................................................................................................  Patient Representative Print Name and Relationship to Patient    ..................................................               ................................................  Date                                            Time    ..........................................................................................................................................  Reviewed by Signature/Title    ...................................................              ..............................................  Date                                                            Time

## 2018-05-10 NOTE — ANESTHESIA CARE TRANSFER NOTE
Patient: J Luis Wolf    Procedure(s):  Percutaneous Liver Biopsy - Wound Class: I-Clean    Diagnosis: Elevated LFTs  Diagnosis Additional Information: No value filed.    Anesthesia Type:   Other, MAC     Note:  Airway :Room Air  Patient transferred to:Phase II  Comments: Report to RN    116/73, 98.2, 16, 76, 97%Handoff Report: Identifed the Patient, Identified the Reponsible Provider, Reviewed the pertinent medical history, Discussed the surgical course, Reviewed Intra-OP anesthesia mangement and issues during anesthesia, Set expectations for post-procedure period and Allowed opportunity for questions and acknowledgement of understanding      Vitals: (Last set prior to Anesthesia Care Transfer)    CRNA VITALS  5/10/2018 0928 - 5/10/2018 1005      5/10/2018             Pulse: 69    SpO2: (!)  86 %    Resp Rate (set): 10                Electronically Signed By: DEANDRE Lal CRNA  May 10, 2018  10:05 AM

## 2018-05-10 NOTE — ANESTHESIA PREPROCEDURE EVALUATION
Anesthesia Evaluation     . Pt has had prior anesthetic.     No history of anesthetic complications          ROS/MED HX    ENT/Pulmonary:     (+)allergic rhinitis, tobacco use, Past use , . Other pulmonary disease Interstitial lung disease, on home O2.    Neurologic:  - neg neurologic ROS     Cardiovascular:     (+) Dyslipidemia, ----. : . . . :. . No previous cardiac testing       METS/Exercise Tolerance:  >4 METS   Hematologic:  - neg hematologic  ROS       Musculoskeletal:  - neg musculoskeletal ROS       GI/Hepatic:  - neg GI/hepatic ROS       Renal/Genitourinary:  - ROS Renal section negative       Endo:  - neg endo ROS       Psychiatric:  - neg psychiatric ROS       Infectious Disease:  - neg infectious disease ROS       Malignancy:      - no malignancy   Other:    - neg other ROS                 Physical Exam  Normal systems: cardiovascular, pulmonary and dental    Airway   Mallampati: I  TM distance: >3 FB  Neck ROM: full    Dental     Cardiovascular   Rhythm and rate: regular and normal      Pulmonary (+) wheezes                       Anesthesia Plan      History & Physical Review  History and physical reviewed and following examination; no interval change.    ASA Status:  3 .    NPO Status:  > 8 hours    Plan for Other (Attempt for local only, minimal sedation if necessary)          Postoperative Care      Consents  Anesthetic plan, risks, benefits and alternatives discussed with:  Patient.  Use of blood products discussed: No .   .                          .

## 2018-05-10 NOTE — DISCHARGE INSTRUCTIONS
Discharge Instructions for Liver Biopsy  You had a procedure called liver biopsy. A healthcare provider used a special needle to remove a small piece of tissue from your liver.  A liver biopsy is ordered after other tests have shown that your liver is not working properly. You may also have a liver biopsy when liver disease is suspected.  Home care  Recommendations include the following:     If you had anesthesia, you should not drive until the day after your biopsy.     Remove the bandage covering the biopsy site 48 hours after the procedure.    Bedrest for 2 hours immediately after the procedure.    Don t shower for 24 hours after the biopsy. If you wish, you may wash yourself with a sponge or washcloth. When you are able to shower, don t scrub the site. Gently wash the area and pat it dry.    Don t lift anything heavier than 10 pounds for 3 days after the procedure, or as advised by your healthcare provider.    Don't do strenuous activities or exercises after the procedure.    Ask your healthcare provider when you can return to work.    Do not start taking blood thinners without clear instructions from your healthcare provider.  Follow-up care  Make a follow-up appointment as directed by our staff.     When to call your healthcare provider  Call your healthcare provider immediately if you have any of the following:    Bleeding from the biopsy site    Dizziness or lightheadedness    Sudden or increased shortness of breath    Sudden chest pain    Fever of 100.4 F (38.0 C) or higher, or as directed by your healthcare provider    Shaking chills    Increasing redness, tenderness, or swelling at the biopsy site    Drainage from the biopsy site    Opening of the biopsy site    Increasing pain, with or without activity, in the liver or belly area, or pain shooting to the right shoulder     Additional Instructions:    Please call our IR service for the following problems:       - If the skin around the biopsy sight is  "swollen, reddened, painful, or has any discharge.  - If you have persistent pain in biopsy sight.  - If you have a fever of greater than 100.5  F and chills.  - If you feel nauseated and \"just not right.\"      [facility]  Interventional Radiology (IR)  [address]  2nd [facility] [place] [address]    Contact Number:  [phone]  (IR control desk)  - Monday - Friday 8:00 am - 4:30 pm    After hours for urgent concerns:  [phone]  - After 4:30 pm Monday - Friday, Weekends and Holidays.   - Ask for Interventional Radiology on-call.  Someone is available 24 hours a day.  - [employer] toll free number:  [phone]               "

## 2018-05-10 NOTE — IP AVS SNAPSHOT
MRN:[id number]                      After Visit Summary   5/10/2018    J Luis Wolf    MRN: [id number]           Thank you!     Thank you for choosing [facility] for your care. Our goal is always to provide you with excellent care. Hearing back from our patients is one way we can continue to improve our services. Please take a few minutes to complete the written survey that you may receive in the mail after you visit with us. Thank you!        Patient Information     Date Of Birth          1940        About your hospital stay     You were admitted on:  May 10, 2018 [facility]    You were discharged on:  May 10, 2018       Who to Call     For medical emergencies, please call 911.  For non-urgent questions about your medical care, please call your primary care provider or clinic, [phone]  For questions related to your surgery, please call your surgery clinic        Attending Provider     Provider Raymon Burns PA-C Radiology       Primary Care Provider Office Phone # Fax #    Mario Foster [phone]      Your next 10 appointments already scheduled     May 15, 2018  9:45 AM CDT   Pulmonary Treatment with Rh Pulmonary Rehab 1   [facility] ([facility])    [address], [address]  [facility] [address]   [phone]            May 17, 2018  9:45 AM CDT   Pulmonary Treatment with Rh Pulmonary Rehab 1   [facility] ([facility])    [address], [address]  [facility] [address]   [phone]            May 31, 2018  1:00 PM CDT   Pulmonary Treatment with Rh Pulmonary Rehab 1   [facility] ([facility])    [address], [address]  [facility] [address]   [phone]            Jun 05, 2018  1:00 PM CDT   Pulmonary Treatment with Rh Pulmonary Rehab 1   [facility] ([facility])    [address]  [facility] [address]   [phone]            Jun 18, 2018 11:00 AM CDT   Pulmonary Treatment with Rh Pulmonary Rehab 2   [facility] ([facility])    [address]  [facility] [address]   [phone]            Aug 22, 2018 11:00 AM CDT   Six Minute Walk with UC PFL 6 MINUTE WALK 1   [facility] Pulmonary Function Testing ([facility])    [address]  3rd [address]  [facility] [address]   [phone]            Aug 22, 2018 12:00 PM CDT   FULL PULMONARY FUNCTION with UC PFL A   [facility] Pulmonary Function Testing ([facility])    [address]  3rd [address]  [facility] [address]   [phone]            Aug 22, 2018  1:00 PM CDT   (Arrive by 12:45 PM)   Return Interstitial Lung with Bill Kraft MD   [facility] ([facility])    [address] [place] [address]  [address]  [facility] [address] [phone]            Nov 06, 2018 10:00 AM CST   DX HIP/PELVIS/SPINE with UCDX1   [facility] ([facility])    [address] [place] [address]  1st Floor  [facility] [address] [phone]           Please do not take any of the following 24 hours prior to the day of your exam: vitamins, calcium tablets, antacids.  If possible, please wear clothes without metal (snaps, zippers). A sweatsuit works well.            Nov 06, 2018 10:30 AM CST   (Arrive by 10:15 AM)   Return Visit with Edu Roberts MD   [facility] ([facility])    [address] [place] [address]  [address]  [facility] [id number] [phone]              Further instructions from your care team         Discharge Instructions for Liver Biopsy  You had a procedure called liver biopsy. A healthcare provider used a special needle to remove a small piece  of tissue from your liver.  A liver biopsy is ordered after other tests have shown that your liver is not working properly. You may also have a liver biopsy when liver disease is suspected.  Home care  Recommendations include the following:     If you had anesthesia, you should not drive until the day after your biopsy.     Remove the bandage covering the biopsy site 48 hours after the procedure.    Bedrest for 2 hours immediately after the procedure.    Don t shower for 24 hours after the biopsy. If you wish, you may wash yourself with a sponge or washcloth. When you are able to shower, don t scrub the site. Gently wash the area and pat it dry.    Don t lift anything heavier than 10 pounds for 3 days after the procedure, or as advised by your healthcare provider.    Don't do strenuous activities or exercises after the procedure.    Ask your healthcare provider when you can return to work.    Do not start taking blood thinners without clear instructions from your healthcare provider.  Follow-up care  Make a follow-up appointment as directed by our staff.     When to call your healthcare provider  Call your healthcare provider immediately if you have any of the following:    Bleeding from the biopsy site    Dizziness or lightheadedness    Sudden or increased shortness of breath    Sudden chest pain    Fever of 100.4 F (38.0 C) or higher, or as directed by your healthcare provider    Shaking chills    Increasing redness, tenderness, or swelling at the biopsy site    Drainage from the biopsy site    Opening of the biopsy site    Increasing pain, with or without activity, in the liver or belly area, or pain shooting to the right shoulder     Additional Instructions:    Please call our IR service for the following problems:       - If the skin around the biopsy sight is swollen, reddened, painful, or has any discharge.  - If you have persistent pain in biopsy sight.  - If you have a fever of greater than 100.5  F and  "chills.  - If you feel nauseated and \"just not right.\"      [facility]  Interventional Radiology (IR)  [address]  [address]nd Floor, [place] [address]    Contact Number:  [phone]  (IR control desk)  - Monday - Friday 8:00 am - 4:30 pm    After hours for urgent concerns:  [phone]  - After 4:30 pm Monday - Friday, Weekends and Holidays.   - Ask for Interventional Radiology on-call.  Someone is available 24 hours a day.  - [facility] toll free number:  [phone]                 Pending Results     Date and Time Order Name Status Description    5/10/2018 0852 IR LIVER BIOPSY PERCUTANEOUS In process             Admission Information     Date & Time Provider Department Dept. Phone    5/10/2018 Raymon Gann PA-C [facility] [phone]      Your Vitals Were     Blood Pressure Pulse Temperature Respirations Height Weight    123/73 92 97.3  F (36.3  C) (Oral) 16 1.702 m (5' 7\") 68.9 kg (152 lb)    Pulse Oximetry BMI (Body Mass Index)                96% 23.81 kg/m2          [employer] Information     [employer] gives you secure access to your electronic health record. If you see a primary care provider, you can also send messages to your care team and make appointments. If you have questions, please call your primary care clinic.  If you do not have a primary care provider, please call [phone] and they will assist you.      [employer] is an electronic gateway that provides easy, online access to your medical records. With [employer], you can request a clinic appointment, read your test results, renew a prescription or communicate with your care team.     To access your existing account, please contact your [facility] or call [phone] for assistance.        Care EveryWhere ID     This is your Care EveryWhere ID. This could be used by other organizations to access your [facility] medical " records  [id number]        Equal Access to Services     [facility] : Hadii sendy najera diliaradha Soalfred, waaxda luqadaha, qaybta kaabrahamstephanie morales, jose alberto mazariegosroberlexie sanchez. So [facility] [phone].    ATENCIÓN: Si habla español, tiene a rivas disposición servicios gratuitos de asistencia lingüística. Llame al [phone].    We comply with applicable federal civil rights laws and [place] laws. We do not discriminate on the basis of race, color, national origin, age, disability, sex, sexual orientation, or gender identity.               Review of your medicines      UNREVIEWED medicines. Ask your doctor about these medicines        Dose / Directions    ADVIL PO        Dose:  200 mg   Take 200 mg by mouth   Refills:  0       alendronate 70 MG tablet   Commonly known as:  FOSAMAX   Used for:  ILD (interstitial lung disease) (H), Long-term use of immunosuppressant medication, Rheumatoid arthritis involving multiple sites with positive rheumatoid factor (H), Disorder of bone and cartilage, Osteoporosis without current pathological fracture, unspecified osteoporosis type        Dose:  70 mg   Take 1 tablet (70 mg) by mouth every 7 days   Quantity:  12 tablet   Refills:  3       azelastine 0.1 % spray   Commonly known as:  ASTELIN        Dose:  1 spray   Spray 1 spray into both nostrils 2 times daily   Refills:  0       benzonatate 100 MG capsule   Commonly known as:  TESSALON   Used for:  ILD (interstitial lung disease) (H)        Dose:  100 mg   Take 1 capsule (100 mg) by mouth 3 times daily as needed for cough   Quantity:  180 capsule   Refills:  11       cetirizine 10 MG tablet   Commonly known as:  zyrTEC        Dose:  10 mg   Take 10 mg by mouth daily   Refills:  0       cyclobenzaprine 5 MG tablet   Commonly known as:  FLEXERIL   Used for:  ILD (interstitial lung disease) (H)        Dose:  5 mg   Take 1 tablet (5 mg) by mouth 3 times daily as needed for muscle spasms   Quantity:  30 tablet   Refills:   0       D3-1000 PO        Take by mouth daily   Refills:  0       DAILY MULTIVITAMIN PO        Dose:  2 tablet   Take 2 tablets by mouth every morning   Refills:  0       hydroxychloroquine 200 MG tablet   Commonly known as:  PLAQUENIL   Used for:  ILD (interstitial lung disease) (H), Long-term use of immunosuppressant medication, Rheumatoid arthritis involving multiple sites with positive rheumatoid factor (H), Disorder of bone and cartilage, Osteoporosis without current pathological fracture, unspecified osteoporosis type        Dose:  400 mg   Take 2 tablets (400 mg) by mouth daily   Quantity:  180 tablet   Refills:  3       losartan 25 MG tablet   Commonly known as:  COZAAR        Dose:  25 mg   Take 25 mg by mouth   Refills:  0       MUCINEX DM PO        Refills:  0       * predniSONE 1 MG tablet   Commonly known as:  DELTASONE   Used for:  ILD (interstitial lung disease) (H)        Patient takes 14 mg daily take 4 1mg tabs by mouth daily with 1 10mg tab.   Quantity:  360 tablet   Refills:  3       * predniSONE 5 MG tablet   Commonly known as:  DELTASONE   Used for:  ILD (interstitial lung disease) (H)        Dose:  15 mg   Take 3 tablets (15 mg) by mouth daily   Quantity:  45 tablet   Refills:  3       * predniSONE 10 MG tablet   Commonly known as:  DELTASONE   Used for:  ILD (interstitial lung disease) (H)        Dose:  10 mg   Take 1 tablet (10 mg) by mouth daily Patient takes a total dose of 13mg daily   Quantity:  90 tablet   Refills:  3       * predniSONE 5 MG tablet   Commonly known as:  DELTASONE   Used for:  ILD (interstitial lung disease) (H)        Dose:  5 mg   Take 1 tablet (5 mg) by mouth daily   Quantity:  90 tablet   Refills:  3       SM CALCIUM CITRATE+/VIT D3 PO        Take by mouth 2 times daily 630/500iu   Refills:  0       sulfamethoxazole-trimethoprim 800-160 MG per tablet   Commonly known as:  BACTRIM DS/SEPTRA DS   Used for:  ILD (interstitial lung disease) (H)        Dose:  1 tablet    Take 1 tablet by mouth Every Mon, Wed, Fri Morning 400-80   Quantity:  36 tablet   Refills:  3       * Notice:  This list has 4 medication(s) that are the same as other medications prescribed for you. Read the directions carefully, and ask your doctor or other care provider to review them with you.             Protect others around you: Learn how to safely use, store and throw away your medicines at www.disposemymeds.org.             Medication List: This is a list of all your medications and when to take them. Check marks below indicate your daily home schedule. Keep this list as a reference.      Medications           Morning Afternoon Evening Bedtime As Needed    ADVIL PO   Take 200 mg by mouth                                alendronate 70 MG tablet   Commonly known as:  FOSAMAX   Take 1 tablet (70 mg) by mouth every 7 days                                azelastine 0.1 % spray   Commonly known as:  ASTELIN   Spray 1 spray into both nostrils 2 times daily                                benzonatate 100 MG capsule   Commonly known as:  TESSALON   Take 1 capsule (100 mg) by mouth 3 times daily as needed for cough                                cetirizine 10 MG tablet   Commonly known as:  zyrTEC   Take 10 mg by mouth daily                                cyclobenzaprine 5 MG tablet   Commonly known as:  FLEXERIL   Take 1 tablet (5 mg) by mouth 3 times daily as needed for muscle spasms                                D3-1000 PO   Take by mouth daily                                DAILY MULTIVITAMIN PO   Take 2 tablets by mouth every morning                                hydroxychloroquine 200 MG tablet   Commonly known as:  PLAQUENIL   Take 2 tablets (400 mg) by mouth daily                                losartan 25 MG tablet   Commonly known as:  COZAAR   Take 25 mg by mouth                                MUCINEX DM PO                                * predniSONE 1 MG tablet   Commonly known as:  DELTASONE   Patient  takes 14 mg daily take 4 1mg tabs by mouth daily with 1 10mg tab.                                * predniSONE 5 MG tablet   Commonly known as:  DELTASONE   Take 3 tablets (15 mg) by mouth daily                                * predniSONE 10 MG tablet   Commonly known as:  DELTASONE   Take 1 tablet (10 mg) by mouth daily Patient takes a total dose of 13mg daily                                * predniSONE 5 MG tablet   Commonly known as:  DELTASONE   Take 1 tablet (5 mg) by mouth daily                                SM CALCIUM CITRATE+/VIT D3 PO   Take by mouth 2 times daily 630/500iu                                sulfamethoxazole-trimethoprim 800-160 MG per tablet   Commonly known as:  BACTRIM DS/SEPTRA DS   Take 1 tablet by mouth Every Mon, Wed, Fri Morning 400-80                                * Notice:  This list has 4 medication(s) that are the same as other medications prescribed for you. Read the directions carefully, and ask your doctor or other care provider to review them with you.

## 2018-05-11 LAB — COPATH REPORT: NORMAL

## 2018-05-14 ENCOUNTER — TELEPHONE (OUTPATIENT)
Dept: GASTROENTEROLOGY | Facility: CLINIC | Age: 78
End: 2018-05-14

## 2018-05-14 ENCOUNTER — HOSPITAL ENCOUNTER (OUTPATIENT)
Dept: LAB | Facility: CLINIC | Age: 78
Discharge: HOME | End: 2018-05-14
Attending: INTERNAL MEDICINE | Admitting: INTERNAL MEDICINE
Payer: COMMERCIAL

## 2018-05-14 DIAGNOSIS — R94.5 ABNORMAL RESULTS OF LIVER FUNCTION STUDIES: Primary | ICD-10-CM

## 2018-05-14 DIAGNOSIS — R94.5 ABNORMAL RESULTS OF LIVER FUNCTION STUDIES: ICD-10-CM

## 2018-05-14 LAB
ALBUMIN SERPL-MCNC: 3.3 G/DL (ref 3.4–5)
ALP SERPL-CCNC: 254 U/L (ref 40–150)
ALT SERPL W P-5'-P-CCNC: 334 U/L (ref 0–70)
AST SERPL W P-5'-P-CCNC: 104 U/L (ref 0–45)
BILIRUB DIRECT SERPL-MCNC: 0.2 MG/DL (ref 0–0.2)
BILIRUB SERPL-MCNC: 0.5 MG/DL (ref 0.2–1.3)
PROT SERPL-MCNC: 7.3 G/DL (ref 6.8–8.8)

## 2018-05-14 PROCEDURE — 80076 HEPATIC FUNCTION PANEL: CPT | Performed by: INTERNAL MEDICINE

## 2018-05-14 PROCEDURE — 36415 COLL VENOUS BLD VENIPUNCTURE: CPT | Performed by: INTERNAL MEDICINE

## 2018-05-15 ENCOUNTER — HOSPITAL ENCOUNTER (OUTPATIENT)
Dept: CARDIAC REHAB | Facility: CLINIC | Age: 78
End: 2018-05-15
Attending: INTERNAL MEDICINE
Payer: COMMERCIAL

## 2018-05-15 DIAGNOSIS — R74.8 ELEVATED LIVER ENZYMES: Primary | ICD-10-CM

## 2018-05-15 LAB
DLCOUNC-%PRED-PRE: 47 %
DLCOUNC-PRE: 10.64 ML/MIN/MMHG
DLCOUNC-PRED: 22.58 ML/MIN/MMHG
ERV-%PRED-PRE: 16 %
ERV-PRE: 0.14 L
ERV-PRED: 0.86 L
EXPTIME-PRE: 6.29 SEC
FEF2575-%PRED-PRE: 43 %
FEF2575-PRE: 0.82 L/SEC
FEF2575-PRED: 1.9 L/SEC
FEFMAX-%PRED-PRE: 60 %
FEFMAX-PRE: 3.96 L/SEC
FEFMAX-PRED: 6.58 L/SEC
FEV1-%PRED-PRE: 36 %
FEV1-PRE: 0.93 L
FEV1FEV6-PRE: 80 %
FEV1FEV6-PRED: 77 %
FEV1FVC-PRE: 80 %
FEV1FVC-PRED: 72 %
FEV1SVC-PRE: 78 %
FEV1SVC-PRED: 66 %
FIFMAX-PRE: 2.99 L/SEC
FRCPLETH-%PRED-PRE: 54 %
FRCPLETH-PRE: 1.94 L
FRCPLETH-PRED: 3.54 L
FVC-%PRED-PRE: 34 %
FVC-PRE: 1.17 L
FVC-PRED: 3.43 L
IC-%PRED-PRE: 35 %
IC-PRE: 1.06 L
IC-PRED: 3.01 L
RVPLETH-%PRED-PRE: 66 %
RVPLETH-PRE: 1.8 L
RVPLETH-PRED: 2.69 L
TLCPLETH-%PRED-PRE: 47 %
TLCPLETH-PRE: 3 L
TLCPLETH-PRED: 6.31 L
VA-%PRED-PRE: 44 %
VA-PRE: 2.7 L
VC-%PRED-PRE: 30 %
VC-PRE: 1.2 L
VC-PRED: 3.87 L

## 2018-05-15 PROCEDURE — G0239 OTH RESP PROC, GROUP: HCPCS

## 2018-05-15 PROCEDURE — 40000244 ZZH STATISTIC VISIT PULM REHAB

## 2018-05-31 ENCOUNTER — HOSPITAL ENCOUNTER (OUTPATIENT)
Dept: CARDIAC REHAB | Facility: CLINIC | Age: 78
End: 2018-05-31
Attending: INTERNAL MEDICINE
Payer: COMMERCIAL

## 2018-05-31 PROCEDURE — 40000244 ZZH STATISTIC VISIT PULM REHAB

## 2018-05-31 PROCEDURE — G0239 OTH RESP PROC, GROUP: HCPCS

## 2018-06-04 DIAGNOSIS — J84.9 ILD (INTERSTITIAL LUNG DISEASE) (H): ICD-10-CM

## 2018-06-04 RX ORDER — BENZONATATE 100 MG/1
100 CAPSULE ORAL 3 TIMES DAILY PRN
Qty: 180 CAPSULE | Refills: 11 | Status: SHIPPED | OUTPATIENT
Start: 2018-06-04 | End: 2018-06-05

## 2018-06-05 ENCOUNTER — HOSPITAL ENCOUNTER (OUTPATIENT)
Dept: CARDIAC REHAB | Facility: CLINIC | Age: 78
End: 2018-06-05
Attending: INTERNAL MEDICINE
Payer: COMMERCIAL

## 2018-06-05 DIAGNOSIS — J84.9 ILD (INTERSTITIAL LUNG DISEASE) (H): ICD-10-CM

## 2018-06-05 PROCEDURE — 40000244 ZZH STATISTIC VISIT PULM REHAB

## 2018-06-05 PROCEDURE — G0239 OTH RESP PROC, GROUP: HCPCS

## 2018-06-05 RX ORDER — BENZONATATE 100 MG/1
100 CAPSULE ORAL 3 TIMES DAILY PRN
Qty: 180 CAPSULE | Refills: 11 | Status: SHIPPED | OUTPATIENT
Start: 2018-06-05 | End: 2019-01-01

## 2018-06-18 ENCOUNTER — HOSPITAL ENCOUNTER (OUTPATIENT)
Dept: CARDIAC REHAB | Facility: CLINIC | Age: 78
End: 2018-06-18
Attending: INTERNAL MEDICINE
Payer: COMMERCIAL

## 2018-06-18 PROCEDURE — G0237 THERAPEUTIC PROCD STRG ENDUR: HCPCS

## 2018-06-18 PROCEDURE — 40000244 ZZH STATISTIC VISIT PULM REHAB

## 2018-06-18 PROCEDURE — G0238 OTH RESP PROC, INDIV: HCPCS

## 2018-06-28 ENCOUNTER — HOSPITAL ENCOUNTER (OUTPATIENT)
Dept: CARDIAC REHAB | Facility: CLINIC | Age: 78
End: 2018-06-28
Attending: INTERNAL MEDICINE
Payer: COMMERCIAL

## 2018-06-28 PROCEDURE — G0239 OTH RESP PROC, GROUP: HCPCS

## 2018-06-28 PROCEDURE — 40000244 ZZH STATISTIC VISIT PULM REHAB

## 2018-07-03 ENCOUNTER — HOSPITAL ENCOUNTER (OUTPATIENT)
Dept: CARDIAC REHAB | Facility: CLINIC | Age: 78
End: 2018-07-03
Attending: INTERNAL MEDICINE
Payer: COMMERCIAL

## 2018-07-03 PROCEDURE — G0239 OTH RESP PROC, GROUP: HCPCS

## 2018-07-03 PROCEDURE — 40000244 ZZH STATISTIC VISIT PULM REHAB

## 2018-07-05 ENCOUNTER — HOSPITAL ENCOUNTER (OUTPATIENT)
Dept: CARDIAC REHAB | Facility: CLINIC | Age: 78
End: 2018-07-05
Attending: INTERNAL MEDICINE
Payer: COMMERCIAL

## 2018-07-05 PROCEDURE — 40000244 ZZH STATISTIC VISIT PULM REHAB

## 2018-07-05 PROCEDURE — G0239 OTH RESP PROC, GROUP: HCPCS

## 2018-07-09 ENCOUNTER — MYC MEDICAL ADVICE (OUTPATIENT)
Dept: GASTROENTEROLOGY | Facility: CLINIC | Age: 78
End: 2018-07-09

## 2018-07-11 DIAGNOSIS — K76.9 LIVER LESION: Primary | ICD-10-CM

## 2018-08-14 ENCOUNTER — HOSPITAL ENCOUNTER (OUTPATIENT)
Dept: CARDIAC REHAB | Facility: CLINIC | Age: 78
End: 2018-08-14
Attending: INTERNAL MEDICINE
Payer: COMMERCIAL

## 2018-08-14 PROCEDURE — 40000244 ZZH STATISTIC VISIT PULM REHAB

## 2018-08-14 PROCEDURE — G0239 OTH RESP PROC, GROUP: HCPCS

## 2018-08-16 ENCOUNTER — HOSPITAL ENCOUNTER (OUTPATIENT)
Dept: CARDIAC REHAB | Facility: CLINIC | Age: 78
End: 2018-08-16
Attending: INTERNAL MEDICINE
Payer: COMMERCIAL

## 2018-08-16 PROCEDURE — 40000244 ZZH STATISTIC VISIT PULM REHAB

## 2018-08-16 PROCEDURE — G0239 OTH RESP PROC, GROUP: HCPCS

## 2018-08-20 ENCOUNTER — HOSPITAL ENCOUNTER (OUTPATIENT)
Dept: CARDIOLOGY | Facility: CLINIC | Age: 78
Discharge: HOME | End: 2018-08-20
Attending: INTERNAL MEDICINE | Admitting: INTERNAL MEDICINE
Payer: COMMERCIAL

## 2018-08-20 ENCOUNTER — HOSPITAL ENCOUNTER (OUTPATIENT)
Dept: CARDIAC REHAB | Facility: CLINIC | Age: 78
End: 2018-08-20
Attending: INTERNAL MEDICINE
Payer: COMMERCIAL

## 2018-08-20 DIAGNOSIS — J84.9 ILD (INTERSTITIAL LUNG DISEASE) (H): ICD-10-CM

## 2018-08-20 PROCEDURE — 40000244 ZZH STATISTIC VISIT PULM REHAB

## 2018-08-20 PROCEDURE — 93306 TTE W/DOPPLER COMPLETE: CPT

## 2018-08-20 PROCEDURE — G0239 OTH RESP PROC, GROUP: HCPCS

## 2018-08-20 PROCEDURE — 93306 TTE W/DOPPLER COMPLETE: CPT | Mod: 26 | Performed by: INTERNAL MEDICINE

## 2018-08-22 ENCOUNTER — OFFICE VISIT (OUTPATIENT)
Dept: PULMONOLOGY | Facility: CLINIC | Age: 78
End: 2018-08-22
Attending: INTERNAL MEDICINE
Payer: COMMERCIAL

## 2018-08-22 ENCOUNTER — RADIANT APPOINTMENT (OUTPATIENT)
Dept: CT IMAGING | Facility: CLINIC | Age: 78
End: 2018-08-22
Attending: INTERNAL MEDICINE
Payer: COMMERCIAL

## 2018-08-22 VITALS
RESPIRATION RATE: 16 BRPM | WEIGHT: 148.1 LBS | SYSTOLIC BLOOD PRESSURE: 137 MMHG | OXYGEN SATURATION: 93 % | HEART RATE: 75 BPM | BODY MASS INDEX: 23.2 KG/M2 | DIASTOLIC BLOOD PRESSURE: 84 MMHG

## 2018-08-22 DIAGNOSIS — J84.9 ILD (INTERSTITIAL LUNG DISEASE) (H): ICD-10-CM

## 2018-08-22 LAB
6 MIN WALK (FT): 1060 FT
6 MIN WALK (M): 323 M

## 2018-08-22 PROCEDURE — G0463 HOSPITAL OUTPT CLINIC VISIT: HCPCS | Mod: ZF

## 2018-08-22 ASSESSMENT — PAIN SCALES - GENERAL: PAINLEVEL: NO PAIN (0)

## 2018-08-22 NOTE — NURSING NOTE
Chief Complaint   Patient presents with     RECHECK     follow up RA/Interstitial lung disease     Pratima Escobar, JCARLOS

## 2018-08-22 NOTE — MR AVS SNAPSHOT
After Visit Summary   8/22/2018    J Luis Wolf    MRN: [id number]           Patient Information     Date Of Birth          1940        Visit Information        Provider Department      8/22/2018 11:30 AM Nickie Millan MD [facility]        Today's Diagnoses     ILD (interstitial lung disease) (H)           Follow-ups after your visit        Follow-up notes from your care team     Return in about 3 months (around 11/22/2018).      Your next 10 appointments already scheduled     Aug 22, 2018  8:00 PM CDT   CT CHEST W/O CONTRAST with UCCT2   [facility] ([facility])    [address] [address] [phone]           Please bring any scans or X-rays taken at other hospitals, if similar tests were done. Also bring a list of your medicines, including vitamins, minerals and over-the-counter drugs. It is safest to leave personal items at home.  Be sure to tell your doctor:   If you have any allergies.   If there s any chance you are pregnant.   If you are breastfeeding.  You do not need to do anything special to prepare for this exam.  Please wear loose clothing, such as a sweat suit or jogging clothes. Avoid snaps, zippers and other metal. We may ask you to undress and put on a hospital gown.            Aug 23, 2018  9:45 AM CDT   Pulmonary Treatment with [facility] Rehab 1   [facility] ([facility])    [address] [place] [address], [address] [address]   [phone]            Sep 13, 2018  9:45 AM CDT   Pulmonary Treatment with Rh Pulmonary Rehab 1   [facility] ([facility])    [address] [place] [address], [address] [address]   [phone]            Sep 18, 2018  9:45 AM CDT   Pulmonary Treatment with Rh Pulmonary Rehab 1   [facility] ([facility])    [address] [place] [address], [facility]  [address]  [facility] [address]   [phone]            Sep 20, 2018  9:45 AM CDT   Pulmonary Treatment with Rh Pulmonary Rehab 1   [facility] ([facility])    [address], [address]  [facility] [address]   [phone]            Sep 24, 2018 11:00 AM CDT   Pulmonary D/C Session with Rh Pulmonary Rehab 2   [facility] ([facility])    [address], [address]  [facility] [address]   [phone]            Nov 06, 2018 10:00 AM CST   DX HIP/PELVIS/SPINE with UCDX1   [facility] ([facility])    [address] [facility]  1st [address]  [facility] [address] [phone]           Please do not take any of the following 24 hours prior to the day of your exam: vitamins, calcium tablets, antacids.  If possible, please wear clothes without metal (snaps, zippers). A sweatsuit works well.            Nov 06, 2018 10:30 AM CST   (Arrive by 10:15 AM)   Return Visit with Edu Roberts MD   [facility] ([facility])    [address] [facility] [address] [facility]  [address]  [facility] [address] [phone]            Dec 07, 2018 10:00 AM CST   FULL PULMONARY FUNCTION with  PFL B   [facility] ([facility])    [address] [facility]  3rd Floor  [facility] [address] [phone]            Dec 07, 2018 11:00 AM CST   (Arrive by 10:45 AM)   Return Interstitial Lung with Nickie Millan MD   [facility] ([facility])    [address] [facility] [address] [facility]  [address]  [facility] [address] [phone]              Future tests that were ordered for you today     Open Future Orders        Priority Expected Expires Ordered    General PFT Lab (Please always keep checked) Routine 11/22/2018 8/22/2019 8/22/2018    Pulmonary Function Test Routine 11/22/2018 8/22/2019 8/22/2018            Who  to contact     If you have questions or need follow up information about today's clinic visit or your schedule please contact [facility] directly at [phone].  Normal or non-critical lab and imaging results will be communicated to you by MyChart, letter or phone within 4 business days after the clinic has received the results. If you do not hear from us within 7 days, please contact the clinic through [facility] or phone. If you have a critical or abnormal lab result, we will notify you by phone as soon as possible.  Submit refill requests through [employer] or call your pharmacy and they will forward the refill request to us. Please allow 3 business days for your refill to be completed.          Additional Information About Your Visit        [facility] Information     [employer] gives you secure access to your electronic health record. If you see a primary care provider, you can also send messages to your care team and make appointments. If you have questions, please call your primary care clinic.  If you do not have a primary care provider, please call [phone] and they will assist you.        Care EveryWhere ID     This is your Care EveryWhere ID. This could be used by other organizations to access your [facility] medical records  [id number]        Your Vitals Were     Pulse Respirations Pulse Oximetry BMI (Body Mass Index)          75 16 93% 23.2 kg/m2         Blood Pressure from Last 3 Encounters:   08/22/18 137/84   05/10/18 (!) 142/94   05/09/18 121/85    Weight from Last 3 Encounters:   08/22/18 67.2 kg (148 lb 1.6 oz)   05/10/18 68.9 kg (152 lb)   05/09/18 68.9 kg (152 lb)              We Performed the Following     CT Chest w/o Contrast        Primary Care Provider Office Phone # Fax #    Mario Foster [phone]       [facility] [address] [address]        Equal Access to Services     HARPREET MARCANO AH: jc Ceja  shant tommyblaswale karynjose alberto hopper ah. So [facility] [phone].    ATENCIÓN: Si misha soliman, tiene a rivas disposición servicios gratuitos de asistencia lingüística. Vitor al [phone].    We comply with applicable federal civil rights laws and [place] laws. We do not discriminate on the basis of race, color, national origin, age, disability, sex, sexual orientation, or gender identity.            Thank you!     Thank you for choosing [facility]  for your care. Our goal is always to provide you with excellent care. Hearing back from our patients is one way we can continue to improve our services. Please take a few minutes to complete the written survey that you may receive in the mail after your visit with us. Thank you!             Your Updated Medication List - Protect others around you: Learn how to safely use, store and throw away your medicines at www.disposemymeds.org.          This list is accurate as of 8/22/18  1:14 PM.  Always use your most recent med list.                   Brand Name Dispense Instructions for use Diagnosis    ADVIL PO      Take 200 mg by mouth        alendronate 70 MG tablet    FOSAMAX    12 tablet    Take 1 tablet (70 mg) by mouth every 7 days    ILD (interstitial lung disease) (H), Long-term use of immunosuppressant medication, Rheumatoid arthritis involving multiple sites with positive rheumatoid factor (H), Disorder of bone and cartilage, Osteoporosis without current pathological fracture, unspecified osteoporosis type       azelastine 0.1 % nasal spray    ASTELIN     Spray 1 spray into both nostrils 2 times daily        benzonatate 100 MG capsule    TESSALON    180 capsule    Take 1 capsule (100 mg) by mouth 3 times daily as needed for cough    ILD (interstitial lung disease) (H)       cetirizine 10 MG tablet    zyrTEC     Take 10 mg by mouth daily        D3-1000 PO      Take by mouth daily        DAILY  MULTIVITAMIN PO      Take 2 tablets by mouth every morning        hydroxychloroquine 200 MG tablet    PLAQUENIL    180 tablet    Take 2 tablets (400 mg) by mouth daily    ILD (interstitial lung disease) (H), Long-term use of immunosuppressant medication, Rheumatoid arthritis involving multiple sites with positive rheumatoid factor (H), Disorder of bone and cartilage, Osteoporosis without current pathological fracture, unspecified osteoporosis type       losartan 25 MG tablet    COZAAR     Take 25 mg by mouth        MUCINEX DM PO           * predniSONE 1 MG tablet    DELTASONE    360 tablet    Patient takes 14 mg daily take 4 1mg tabs by mouth daily with 1 10mg tab.    ILD (interstitial lung disease) (H)       * predniSONE 5 MG tablet    DELTASONE    45 tablet    Take 3 tablets (15 mg) by mouth daily    ILD (interstitial lung disease) (H)       * predniSONE 10 MG tablet    DELTASONE    90 tablet    Take 1 tablet (10 mg) by mouth daily Patient takes a total dose of 13mg daily    ILD (interstitial lung disease) (H)       * predniSONE 5 MG tablet    DELTASONE    90 tablet    Take 1 tablet (5 mg) by mouth daily    ILD (interstitial lung disease) (H)       SM CALCIUM CITRATE+/VIT D3 PO      Take by mouth 2 times daily 630/500iu        sulfamethoxazole-trimethoprim 800-160 MG per tablet    BACTRIM DS/SEPTRA DS    36 tablet    Take 1 tablet by mouth Every Mon, Wed, Fri Morning 400-80    ILD (interstitial lung disease) (H)       * Notice:  This list has 4 medication(s) that are the same as other medications prescribed for you. Read the directions carefully, and ask your doctor or other care provider to review them with you.

## 2018-08-22 NOTE — LETTER
8/22/2018      RE: J Luis Wolf  [address] [facility] [address]       [facility]    Reason for Visit  J Lius Wolf is a 78 year old year old male who is being seen for RECHECK (follow up RA/Interstitial lung disease)    HPI    Mr. Wolf is a 78-year-old who is here for follow-up of rheumatoid arthritis interstitial lung disease.  He usually sees Dr. Kraft, but he is seeing me today because Dr. Kraft has left the [facility].  Mr. Wolf reports that his breathing feels stable to him.  He does get short of breath when he walks uphill and slightly winded when he walks up one flight of stairs.  Uses oxygen at home 2-3 L/min.  He has a chronic cough which is a little bit worse now because of allergies.  He denies fevers.  He does take prednisone 15 mg daily and Plaquenil for his rheumatoid arthritis.  His joints are under relatively good control with this regimen.    In the past he has been treated with mycophenolate for his rheumatoid arthritis ILD, but it was stopped because of 20 pound weight loss and anorexia after less than a year.  He also has tried Imuran, but that was stopped because of chills and fevers.  He has been on prednisone chronically for the past at least 3 years.  He currently is participating in pulmonary rehab and finds it beneficial.  He reports that his oxygen saturation dropped down to a low of 88-89% during exercise at pulmonary rehab on 4 L/min oxygen.    He had elevated liver enzymes in the spring and was evaluated by GI.  Last liver enzymes in July were normal.        Current Outpatient Prescriptions   Medication     alendronate (FOSAMAX) 70 MG tablet     azelastine (ASTELIN) 137 MCG/SPRAY nasal spray     benzonatate (TESSALON) 100 MG capsule     Calcium Citrate-Vitamin D (SM CALCIUM CITRATE+/VIT D3 PO)     cetirizine (ZYRTEC) 10 MG tablet     Cholecalciferol (D3-1000 PO)     Dextromethorphan-Guaifenesin (MUCINEX DM PO)      hydroxychloroquine (PLAQUENIL) 200 MG tablet     Ibuprofen (ADVIL PO)     losartan (COZAAR) 25 MG tablet     Multiple Vitamin (DAILY MULTIVITAMIN PO)     predniSONE (DELTASONE) 10 MG tablet     predniSONE (DELTASONE) 5 MG tablet     predniSONE (DELTASONE) 5 MG tablet     sulfamethoxazole-trimethoprim (BACTRIM DS/SEPTRA DS) 800-160 MG per tablet     predniSONE (DELTASONE) 1 MG tablet     No current facility-administered medications for this visit.      Allergies   Allergen Reactions     Flu Virus Vaccine      Swollen face     Pistachios [Nuts] Swelling and Cough     Not tested medically     Past Medical History:   Diagnosis Date     ILD (interstitial lung disease) (H)     associated with rheumatoid arthritis, methotrexate stopped 7/2013, started MMF 12/2014 but stopped 2015 due to 20 lb weight loss and anorexia.  Did not tolerate imuran due to chills/fevers.     Rheumatoid arthritis (H)     seropositive RA dx 2012       Past Surgical History:   Procedure Laterality Date     APPENDECTOMY  1956     BACK SURGERY  2014    Compression fx thoracic vertebra     COLONOSCOPY  2014     ORTHOPEDIC SURGERY  2015    Bunionectomy lt foot     PERCUTANEOUS BIOPSY LIVER N/A 5/10/2018    Procedure: PERCUTANEOUS BIOPSY LIVER;  Percutaneous Liver Biopsy;  Surgeon: Raymon Gann PA-C;  Location: [facility] OR     SOFT TISSUE SURGERY  2015    Bursitis rt hip cortisone injection 11 15 2015     THORACIC SURGERY  2012    Pulmonary fibrosis       Social History     Social History     Marital status:      Spouse name: N/A     Number of children: N/A     Years of education: N/A     Occupational History     Not on file.     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       Family History   Problem Relation Age of Onset     Coronary Artery Disease Father      Age 77  age 80     Hyperlipidemia Father      Hypertension Mother       age 105     Rheumatoid Arthritis No family hx of              ROS Pulmonary  A complete ROS was otherwise negative except as noted in the HPI.    Vitals: /84 (BP Location: Right arm, Patient Position: Chair, Cuff Size: Adult Regular)  Pulse 75  Resp 16  Wt 67.2 kg (148 lb 1.6 oz)  SpO2 93%  BMI 23.2 kg/m2    Exam:   GENERAL APPEARANCE: Well developed, well nourished, alert, and in no apparent distress.  RESP: good air flow throughout.  Bibasilar inspiratory crackles. No rhonchi. No wheezes.  CV: Normal S1, S2, regular rhythm, normal rate. No murmur.  No LE edema.   MS: extremities normal. No clubbing. No cyanosis.  SKIN: no rash on limited exam.  NEURO: Mentation intact, speech normal, normal gait and stance.  PSYCH: mentation appears normal. and affect normal/bright.    Results:  Recent Results (from the past 168 hour(s))   6 minute walk test    Collection Time: 18 12:00 AM   Result Value Ref Range    6 min walk (FT) 1060 ft    6 Min Walk (M) 323 m   General PFT Lab (Please always keep checked)    Collection Time: 18  9:52 AM   Result Value Ref Range    FVC-Pred 3.42 L    FVC-Pre 1.12 L    FVC-%Pred-Pre 32 %    FEV1-Pre 0.86 L    FEV1-%Pred-Pre 33 %    FEV1FVC-Pred 72 %    FEV1FVC-Pre 76 %    FEFMax-Pred 6.54 L/sec    FEFMax-Pre 3.52 L/sec    FEFMax-%Pred-Pre 53 %    FEF2575-Pred 1.89 L/sec    FEF2575-Pre 0.65 L/sec    DIO1556-%Pred-Pre 34 %    ExpTime-Pre 7.20 sec    FIFMax-Pre 2.60 L/sec    VC-Pred 3.86 L    VC-Pre 1.22 L    VC-%Pred-Pre 31 %    IC-Pred 2.98 L    IC-Pre 0.90 L    IC-%Pred-Pre 30 %    ERV-Pred 0.88 L    ERV-Pre 0.32 L    ERV-%Pred-Pre 36 %     FEV1FEV6-Pred 76 %    FEV1FEV6-Pre 77 %    FRCPleth-Pred 3.54 L    FRCPleth-Pre 1.74 L    FRCPleth-%Pred-Pre 49 %    RVPleth-Pred 2.69 L    RVPleth-Pre 1.42 L    RVPleth-%Pred-Pre 52 %    TLCPleth-Pred 6.31 L    TLCPleth-Pre 2.64 L    TLCPleth-%Pred-Pre 41 %    DLCOunc-Pred 22.52 ml/min/mmHg    DLCOunc-Pre 9.06 ml/min/mmHg    DLCOunc-%Pred-Pre 40 %    VA-Pre 2.40 L    VA-%Pred-Pre 39 %    FEV1SVC-Pred 67 %    FEV1SVC-Pre 70 %       I reviewed pulmonary function test that was performed today as well as 6 minute walk test.  PFT today shows very severe restrictive lung disease with a moderately reduced diffusion.  Lung function is stable compared to 3 months ago, however it remains reduced compared to October 2017.  6 minute walk test today shows no hypoxia on 4 L/min oxygen.  Walk distance is reduced.    I reviewed results with the patient.      Assessment and plan:   Mr. Wolf is a 78-year-old who is here to follow-up on rheumatoid arthritis ILD.  1.  Rheumatoid arthritis ILD.  His dyspnea on exertion is stable with some worsening of his cough, but he attributes this to allergies.  However, PFT remains down compared to 10 months ago.  I will get a high-resolution chest CT with expiratory images today to further evaluate.  If the ILD is stable, then we will continue to observe.  If his ILD is worse, then we will discuss further treatments such as rituximab.  He did not tolerate mycophenolate in the past because of 20 pound weight loss and anorexia.  He also did not tolerate Imuran because of chills and fevers.  We will call him with results of the CT scan and further recommendations.  He will return in 3 months with full PFT.  In the meantime, he will continue his current prednisone dose of 15 mg daily.  2.  Exertional hypoxia.  He will continue supplemental oxygen up to 4 L/min with activity.  I have asked the nurse to talk with him about the [employer] portable oxygen concentrator and whether he can go up that  high.  3.  Healthcare maintenance.  He does not receive the flu vaccine because he had a significant reaction to the swine flu vaccine in the 1970s.              Nickie Millan MD

## 2018-08-22 NOTE — PROGRESS NOTES
[facility]    Reason for Visit  J Luis Wolf is a 78 year old year old male who is being seen for RECHECK (follow up RA/Interstitial lung disease)    HPI    Mr. Wolf is a 78-year-old who is here for follow-up of rheumatoid arthritis interstitial lung disease.  He usually sees Dr. Kraft, but he is seeing me today because Dr. Kraft has left the [facility].  Mr. Wolf reports that his breathing feels stable to him.  He does get short of breath when he walks uphill and slightly winded when he walks up one flight of stairs.  Uses oxygen at home 2-3 L/min.  He has a chronic cough which is a little bit worse now because of allergies.  He denies fevers.  He does take prednisone 15 mg daily and Plaquenil for his rheumatoid arthritis.  His joints are under relatively good control with this regimen.    In the past he has been treated with mycophenolate for his rheumatoid arthritis ILD, but it was stopped because of 20 pound weight loss and anorexia after less than a year.  He also has tried Imuran, but that was stopped because of chills and fevers.  He has been on prednisone chronically for the past at least 3 years.  He currently is participating in pulmonary rehab and finds it beneficial.  He reports that his oxygen saturation dropped down to a low of 88-89% during exercise at pulmonary rehab on 4 L/min oxygen.    He had elevated liver enzymes in the spring and was evaluated by GI.  Last liver enzymes in July were normal.        Current Outpatient Prescriptions   Medication     alendronate (FOSAMAX) 70 MG tablet     azelastine (ASTELIN) 137 MCG/SPRAY nasal spray     benzonatate (TESSALON) 100 MG capsule     Calcium Citrate-Vitamin D (SM CALCIUM CITRATE+/VIT D3 PO)     cetirizine (ZYRTEC) 10 MG tablet     Cholecalciferol (D3-1000 PO)     Dextromethorphan-Guaifenesin (MUCINEX DM PO)     hydroxychloroquine (PLAQUENIL) 200 MG tablet     Ibuprofen (ADVIL PO)     losartan  (COZAAR) 25 MG tablet     Multiple Vitamin (DAILY MULTIVITAMIN PO)     predniSONE (DELTASONE) 10 MG tablet     predniSONE (DELTASONE) 5 MG tablet     predniSONE (DELTASONE) 5 MG tablet     sulfamethoxazole-trimethoprim (BACTRIM DS/SEPTRA DS) 800-160 MG per tablet     predniSONE (DELTASONE) 1 MG tablet     No current facility-administered medications for this visit.      Allergies   Allergen Reactions     Flu Virus Vaccine      Swollen face     Pistachios [Nuts] Swelling and Cough     Not tested medically     Past Medical History:   Diagnosis Date     ILD (interstitial lung disease) (H)     associated with rheumatoid arthritis, methotrexate stopped 7/2013, started MMF 12/2014 but stopped 2015 due to 20 lb weight loss and anorexia.  Did not tolerate imuran due to chills/fevers.     Rheumatoid arthritis (H)     seropositive RA dx 2012       Past Surgical History:   Procedure Laterality Date     APPENDECTOMY  1956     BACK SURGERY  2014    Compression fx thoracic vertebra     COLONOSCOPY  2014     ORTHOPEDIC SURGERY  2015    Bunionectomy lt foot     PERCUTANEOUS BIOPSY LIVER N/A 5/10/2018    Procedure: PERCUTANEOUS BIOPSY LIVER;  Percutaneous Liver Biopsy;  Surgeon: Raymon Gann PA-C;  Location: [facility]     SOFT TISSUE SURGERY  2015    Bursitis rt hip cortisone injection 11 15 2015     THORACIC SURGERY  2012    Pulmonary fibrosis       Social History     Social History     Marital status:      Spouse name: N/A     Number of children: N/A     Years of education: N/A     Occupational History     Not on file.     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       Family History   Problem Relation Age of Onset     Coronary Artery Disease Father      Age 77  age 80     Hyperlipidemia Father      Hypertension Mother       age 105     Rheumatoid Arthritis No family hx of              ROS Pulmonary  A complete ROS was otherwise negative except as noted in the HPI.    Vitals: /84 (BP Location: Right arm, Patient Position: Chair, Cuff Size: Adult Regular)  Pulse 75  Resp 16  Wt 67.2 kg (148 lb 1.6 oz)  SpO2 93%  BMI 23.2 kg/m2    Exam:   GENERAL APPEARANCE: Well developed, well nourished, alert, and in no apparent distress.  RESP: good air flow throughout.  Bibasilar inspiratory crackles. No rhonchi. No wheezes.  CV: Normal S1, S2, regular rhythm, normal rate. No murmur.  No LE edema.   MS: extremities normal. No clubbing. No cyanosis.  SKIN: no rash on limited exam.  NEURO: Mentation intact, speech normal, normal gait and stance.  PSYCH: mentation appears normal. and affect normal/bright.    Results:  Recent Results (from the past 168 hour(s))   6 minute walk test    Collection Time: 18 12:00 AM   Result Value Ref Range    6 min walk (FT) 1060 ft    6 Min Walk (M) 323 m   General PFT Lab (Please always keep checked)    Collection Time: 18  9:52 AM   Result Value Ref Range    FVC-Pred 3.42 L    FVC-Pre 1.12 L    FVC-%Pred-Pre 32 %    FEV1-Pre 0.86 L    FEV1-%Pred-Pre 33 %    FEV1FVC-Pred 72 %    FEV1FVC-Pre 76 %    FEFMax-Pred 6.54 L/sec    FEFMax-Pre 3.52 L/sec    FEFMax-%Pred-Pre 53 %    FEF2575-Pred 1.89 L/sec    FEF2575-Pre 0.65 L/sec    OKW6536-%Pred-Pre 34 %    ExpTime-Pre 7.20 sec    FIFMax-Pre 2.60 L/sec    VC-Pred 3.86 L    VC-Pre 1.22 L    VC-%Pred-Pre 31 %    IC-Pred 2.98 L    IC-Pre 0.90 L    IC-%Pred-Pre 30 %    ERV-Pred 0.88 L    ERV-Pre 0.32 L    ERV-%Pred-Pre 36 %    FEV1FEV6-Pred 76 %    FEV1FEV6-Pre 77 %    FRCPleth-Pred 3.54 L    FRCPleth-Pre 1.74  L    FRCPleth-%Pred-Pre 49 %    RVPleth-Pred 2.69 L    RVPleth-Pre 1.42 L    RVPleth-%Pred-Pre 52 %    TLCPleth-Pred 6.31 L    TLCPleth-Pre 2.64 L    TLCPleth-%Pred-Pre 41 %    DLCOunc-Pred 22.52 ml/min/mmHg    DLCOunc-Pre 9.06 ml/min/mmHg    DLCOunc-%Pred-Pre 40 %    VA-Pre 2.40 L    VA-%Pred-Pre 39 %    FEV1SVC-Pred 67 %    FEV1SVC-Pre 70 %       I reviewed pulmonary function test that was performed today as well as 6 minute walk test.  PFT today shows very severe restrictive lung disease with a moderately reduced diffusion.  Lung function is stable compared to 3 months ago, however it remains reduced compared to October 2017.  6 minute walk test today shows no hypoxia on 4 L/min oxygen.  Walk distance is reduced.    I reviewed results with the patient.      Assessment and plan:   Mr. Wolf is a 78-year-old who is here to follow-up on rheumatoid arthritis ILD.  1.  Rheumatoid arthritis ILD.  His dyspnea on exertion is stable with some worsening of his cough, but he attributes this to allergies.  However, PFT remains down compared to 10 months ago.  I will get a high-resolution chest CT with expiratory images today to further evaluate.  If the ILD is stable, then we will continue to observe.  If his ILD is worse, then we will discuss further treatments such as rituximab.  He did not tolerate mycophenolate in the past because of 20 pound weight loss and anorexia.  He also did not tolerate Imuran because of chills and fevers.  We will call him with results of the CT scan and further recommendations.  He will return in 3 months with full PFT.  In the meantime, he will continue his current prednisone dose of 15 mg daily.  2.  Exertional hypoxia.  He will continue supplemental oxygen up to 4 L/min with activity.  I have asked the nurse to talk with him about the [employer] portable oxygen concentrator and whether he can go up that high.  3.  Healthcare maintenance.  He does not receive the flu vaccine because he had a  significant reaction to the swine flu vaccine in the 1970s.

## 2018-08-23 ENCOUNTER — HOSPITAL ENCOUNTER (OUTPATIENT)
Dept: CARDIAC REHAB | Facility: CLINIC | Age: 78
End: 2018-08-23
Attending: INTERNAL MEDICINE
Payer: COMMERCIAL

## 2018-08-23 DIAGNOSIS — J84.9 ILD (INTERSTITIAL LUNG DISEASE) (H): ICD-10-CM

## 2018-08-23 DIAGNOSIS — M81.0 OSTEOPOROSIS WITHOUT CURRENT PATHOLOGICAL FRACTURE, UNSPECIFIED OSTEOPOROSIS TYPE: ICD-10-CM

## 2018-08-23 DIAGNOSIS — M05.79 RHEUMATOID ARTHRITIS INVOLVING MULTIPLE SITES WITH POSITIVE RHEUMATOID FACTOR (H): ICD-10-CM

## 2018-08-23 DIAGNOSIS — M94.9 DISORDER OF BONE AND CARTILAGE: ICD-10-CM

## 2018-08-23 DIAGNOSIS — M89.9 DISORDER OF BONE AND CARTILAGE: ICD-10-CM

## 2018-08-23 DIAGNOSIS — Z79.60 LONG-TERM USE OF IMMUNOSUPPRESSANT MEDICATION: ICD-10-CM

## 2018-08-23 PROCEDURE — 40000244 ZZH STATISTIC VISIT PULM REHAB

## 2018-08-23 PROCEDURE — G0239 OTH RESP PROC, GROUP: HCPCS

## 2018-08-24 RX ORDER — ALENDRONATE SODIUM 70 MG/1
70 TABLET ORAL
Qty: 12 TABLET | Refills: 0 | Status: CANCELLED | OUTPATIENT
Start: 2018-08-24

## 2018-08-24 RX ORDER — ALENDRONATE SODIUM 70 MG/1
70 TABLET ORAL
Qty: 12 TABLET | Refills: 0 | Status: SHIPPED | OUTPATIENT
Start: 2018-08-24 | End: 2018-01-01

## 2018-09-05 NOTE — TELEPHONE ENCOUNTER
[facility] Call Center    Phone Message    May a detailed message be left on voicemail: yes    Reason for Call: Other: The pt is making the december US appt, and he and his wife are wondering if they need to see Dr Calderon at this time also?  Please call the pt to discuss. Thanks.    Action Taken: Message routed to:  [facility] ([facility]): inder macdonald

## 2018-09-13 ENCOUNTER — HOSPITAL ENCOUNTER (OUTPATIENT)
Dept: CARDIAC REHAB | Facility: CLINIC | Age: 78
End: 2018-09-13
Attending: INTERNAL MEDICINE
Payer: COMMERCIAL

## 2018-09-13 PROCEDURE — G0239 OTH RESP PROC, GROUP: HCPCS

## 2018-09-13 PROCEDURE — 40000244 ZZH STATISTIC VISIT PULM REHAB

## 2018-09-17 LAB
DLCOUNC-%PRED-PRE: 40 %
DLCOUNC-PRE: 9.06 ML/MIN/MMHG
DLCOUNC-PRED: 22.52 ML/MIN/MMHG
ERV-%PRED-PRE: 36 %
ERV-PRE: 0.32 L
ERV-PRED: 0.88 L
EXPTIME-PRE: 7.2 SEC
FEF2575-%PRED-PRE: 34 %
FEF2575-PRE: 0.65 L/SEC
FEF2575-PRED: 1.89 L/SEC
FEFMAX-%PRED-PRE: 53 %
FEFMAX-PRE: 3.52 L/SEC
FEFMAX-PRED: 6.54 L/SEC
FEV1-%PRED-PRE: 33 %
FEV1-PRE: 0.86 L
FEV1FEV6-PRE: 77 %
FEV1FEV6-PRED: 76 %
FEV1FVC-PRE: 76 %
FEV1FVC-PRED: 72 %
FEV1SVC-PRE: 70 %
FEV1SVC-PRED: 67 %
FIFMAX-PRE: 2.6 L/SEC
FRCPLETH-%PRED-PRE: 49 %
FRCPLETH-PRE: 1.74 L
FRCPLETH-PRED: 3.54 L
FVC-%PRED-PRE: 32 %
FVC-PRE: 1.12 L
FVC-PRED: 3.42 L
IC-%PRED-PRE: 30 %
IC-PRE: 0.9 L
IC-PRED: 2.98 L
RVPLETH-%PRED-PRE: 52 %
RVPLETH-PRE: 1.42 L
RVPLETH-PRED: 2.69 L
TLCPLETH-%PRED-PRE: 41 %
TLCPLETH-PRE: 2.64 L
TLCPLETH-PRED: 6.31 L
VA-%PRED-PRE: 39 %
VA-PRE: 2.4 L
VC-%PRED-PRE: 31 %
VC-PRE: 1.22 L
VC-PRED: 3.86 L

## 2018-09-18 ENCOUNTER — HOSPITAL ENCOUNTER (OUTPATIENT)
Dept: CARDIAC REHAB | Facility: CLINIC | Age: 78
End: 2018-09-18
Attending: INTERNAL MEDICINE
Payer: COMMERCIAL

## 2018-09-18 PROCEDURE — 40000244 ZZH STATISTIC VISIT PULM REHAB

## 2018-09-18 PROCEDURE — G0239 OTH RESP PROC, GROUP: HCPCS

## 2018-09-20 ENCOUNTER — HOSPITAL ENCOUNTER (OUTPATIENT)
Dept: CARDIAC REHAB | Facility: CLINIC | Age: 78
End: 2018-09-20
Attending: INTERNAL MEDICINE
Payer: COMMERCIAL

## 2018-09-20 PROCEDURE — G0239 OTH RESP PROC, GROUP: HCPCS

## 2018-09-20 PROCEDURE — 40000244 ZZH STATISTIC VISIT PULM REHAB

## 2018-09-24 ENCOUNTER — HOSPITAL ENCOUNTER (OUTPATIENT)
Dept: CARDIAC REHAB | Facility: CLINIC | Age: 78
End: 2018-09-24
Attending: INTERNAL MEDICINE
Payer: COMMERCIAL

## 2018-09-24 PROCEDURE — G0238 OTH RESP PROC, INDIV: HCPCS

## 2018-09-24 PROCEDURE — 40000244 ZZH STATISTIC VISIT PULM REHAB

## 2018-09-24 PROCEDURE — G0237 THERAPEUTIC PROCD STRG ENDUR: HCPCS

## 2018-11-06 PROBLEM — M05.79 RHEUMATOID ARTHRITIS INVOLVING MULTIPLE SITES WITH POSITIVE RHEUMATOID FACTOR (H): Status: ACTIVE | Noted: 2018-01-01

## 2018-11-06 NOTE — PROGRESS NOTES
returns for management of RA complicated by ILD. +CCP, +RF, -GIANCARLO. No history of erosions. FVC 39%/DLCO 58% . Previously failed methotrexate, azathioprine, and mycophenolate. Prednisone dependent. Gabapentin failed due to somnolence.     His breathing is intermittently poor and he is currently exercising less due to this. He also reports persistent right leg radiculopathy pain. He failed use of gabapentin due to somnolence.     He continues to do well with his joints. He denies joint pains, increased dysfunction or deformities. Energy is reduced in part due to lack of exercise.     Prednisone 15 mg daily to control lung progression.  Hydroxychlorquine 400 mg daily with stable lab and eye testing. Tylenol 500 mg one tablet four times daily. No Advil at present. He has continues to use occasional excedrin for pain. Alendronate 70 mg once weekly    PMI:  Medical-RA with ILD, atopic rhinitis, hip bursitis and gluteal tendinopathy, osteoporosis with T4-5 vertebral fractures (T = -2.9 ), hyperlipidemia, right rotator cuff disease, DDD/DJD with foraminal stenosis.  Surgical-appendectomy, bunion surgery and toe surgical straightening, right elbow surgery  Injury-right elbow laceration, right knee injury, low back injury    SH:  Retired ,  with two children. Former smoker (5 years with occasional cigarette use), 1 EtOH per day, no illicit drug use. Silica and Right handed.    FH:  Mother lived to very old age, now dead  Father dead at 80 with heart disease and OA  Brother with celiac disease  Children and grandchildren are healthy    PMSF history personally reviewed by me today.    ROS:  +skin flaking lesion  +intermittent paresthesias  +seasonal allergy  Remainder of the 14 point ROS obtained and found negative.    Physical Exam:  Constitutional: WD-WN-WG cooperative; +continuous O2 therapy  Eyes: nl EOM, PERRLA, vision, conjunctiva, sclera  ENT: nl external ears, nose, hearing,  lips, teeth, gums, throat  Neck: no mass or thyroid enlargement  Resp: +lungs with bibasilar rales; nl to palpation, nl effort  CV: +irregularly irregular rhythm, no murmurs, rubs or gallops, no edema  GI: no ABD mass or tenderness, no HSM  : not tested  Lymph: no cervical or epitrochlear nodes  MS: +heberden's nodes with diffuse angular deformities; bilateral left>right wrist reduced extension and 1st CMC squaring;  +right shoulder with only minimal 30 degrees active abduction and limited rotation; neck rotation 45 degrees bilaterally and no extension; All other TMJ, neck, shoulder, elbow, wrist, hand, spine, hip, knee, ankle, and foot joints were examined and otherwise found normal. Normal  strength. Intact tuck. Fairly normal prayer.  Skin: no nail pitting, alopecia, rash  Neuro: nl cranial nerves, strength, sensation, no DTRs.   Psych: nl judgement, orientation, memory, affect.    Laboratory:      Copath Report 05/10/2018  9:14 AM 88   Patient Name: BOUCHRA DOMINGUEZ   MR#: [id number]   Specimen #: [id number]   Collected: 5/10/2018   Received: 5/10/2018   Reported: 5/11/2018 11:25   Ordering Phy(s): JUAN MEDINA   Additional Phy(s): LAINA CANTU     For improved result formatting, select 'View Enhanced Report Format' under    Linked Documents section.     SPECIMEN(S):   Liver needle biopsy     FINAL DIAGNOSIS:   Liver, needle biopsy   - Findings consistent with resolving hepatitis with minimal portal   fibrosis   - See microscopic description     I have personally reviewed all specimens and/or slides, including the   listed special stains, and used them   with my medical judgement to determine or confirm the final diagnosis.     Electronically signed out by:     Johnny Burns M.D., [facility]     CLINICAL HISTORY:   78-year-old male with a history of rheumatoid arthritis and interstitial   lung disease presents with increased   liver enzymes. On April 30 his AST was 817, ALT 1498, alkaline  "phosphatase    260 with a total bilirubin of 0.9.   Viral serologies including EBV and CMV are negative. Antinuclear and   F-actin antibodies are negative. The   patient routinely takes 2-3 Tylenol day and is on 15 mg of prednisone on   day.   GROSS:   A: The specimen is received in formalin with proper patient   identification, labeled \"liver biopsy\".  The   specimen consists of five tan cores ranging from 0.2-1.7 cm in length and   averaging 0.1 cm in diameter.  The   specimen is wrapped and entirely submitted in cassette A1.(Dictated by:   Fe COREY [facility] 5/10/2018 10:55   AM)     MICROSCOPIC:   Examination of the trichrome stain reveals liver showing normal   architecture with mild nonbridging portal   fibrosis. A reticulin stain is shows patchy areas of hepatocellular   atrophy without clear-cut collapse. The   hematoxylin and eosin stained slides demonstrate a mild portal infiltrate   of lymphocytes with easily   identified neutrophils and occasional eosinophils. Many portal tracts have    only a few scattered neutrophils.   Plasma cells are not prominent. The hepatocellular lobule does not   demonstrate significant hepatocellular   necrosis or fatty change. There are hepatocellular mitoses present.  There    are only a few small clusters of   lymphocytes present. There is pigment present in Kupffer cells consistent   with possible iron or lipofuscin   from prior necrosis.     The findings in this biopsy are nonspecific and are not consistent with   the laboratory values from April 30.   This would suggest that whatever was occurring on April 30 has partially   resolved at the current time. There   is some portal tract widening but there is no significant fibrosis.   Potential etiologies would include some   type of transient medication or toxic reaction, or a transient viral   infection although the common viral   agents have been excluded. The possibility of autoimmune hepatitis which   has " spontaneously resolved also   cannot be excluded.     CPT Codes:   A: 68750-GX9, 98279-YFBI, 69638-XORQ     TESTING LAB LOCATION:   [facility], [address]   [address] [phone]     COLLECTION SITE:   Client: [facility]   [facility]: [facility] ([facility])        Component      Latest Ref Rng & Units 4/27/2018   Bilirubin Direct      0.0 - 0.2 mg/dL 0.4 (H)   Bilirubin Total      0.2 - 1.3 mg/dL 0.7   Albumin      3.4 - 5.0 g/dL 3.4   Protein Total      6.8 - 8.8 g/dL 7.3   Alkaline Phosphatase      40 - 150 U/L 216 (H)   ALT      0 - 70 U/L 1080 (HH)   AST      0 - 45 U/L 646 (HH)   CMV DNA Quantitation Specimen       Plasma, EDTA anticoagulant   CMV Quant IU/mL      CMVND:CMV DNA Not Detected [IU]/mL CMV DNA Not Detected   Log IU/mL of CMVQNT      <2.1 Log:IU/mL Not Calculated   Iron      35 - 180 ug/dL 75   Iron Binding Cap      240 - 430 ug/dL 246   Iron Saturation Index      15 - 46 % 30   EBV DNA Copies/mL      EBVNEG:EBV DNA Not Detected Copies/mL EBV DNA Not Detected   EBV DNA Log of Copies      <2.7 Log:copies/mL Not Calculated   CMV Antibody IgG      0.0 - 0.8 AI <0.2   EBV Capsid Antibody IgG      0.0 - 0.8 AI 6.8 (H)   EBV Capsid Antibody IgM      0.0 - 0.8 AI 0.4   GIANCARLO interpretation      NEG:Negative Negative   Ferritin      26 - 388 ng/mL 1033 (H)   F-Actin Antibody IgG      0 - 19 Units 3   Mitochondrial M2 Antibody IgG      <4 U/mL 1     Component      Latest Ref Rng & Units 4/24/2018   HCV RNA Quant IU/ml      HCVND:HCV RNA Not Detected [IU]/mL HCV RNA Not Detected   Log of HCV RNA Qt      <1.2 Log IU/mL Not Calculated   Hepatitis C Antibody      NR:Nonreactive Nonreactive   Hep B Surface Agn      NR:Nonreactive Nonreactive   Hepatitis B Core IgM      NR:Nonreactive Nonreactive   Hepatitis A IgM Annemarie      NR:Nonreactive Nonreactive     Component      Latest Ref Rng & Units 4/24/2018  4/24/2018          11:38 AM 11:38 AM   Sodium      133 - 144 mmol/L  137   Potassium      3.4 - 5.3 mmol/L  4.1   Chloride      94 - 109 mmol/L  99   Carbon Dioxide      20 - 32 mmol/L  28   Anion Gap      3 - 14 mmol/L  9   Glucose      70 - 99 mg/dL  114 (H)   Urea Nitrogen      7 - 30 mg/dL  18   Creatinine      0.66 - 1.25 mg/dL 0.81 0.81   GFR Estimate      >60 mL/min/1.7m2 >90 >90   GFR Estimate If Black      >60 mL/min/1.7m2 >90 >90   Calcium      8.5 - 10.1 mg/dL  9.3   Bilirubin Direct      0.0 - 0.2 mg/dL 0.4 (H)    Bilirubin Total      0.2 - 1.3 mg/dL 0.8    Albumin      3.4 - 5.0 g/dL 3.2 (L)    Protein Total      6.8 - 8.8 g/dL 7.2    Alkaline Phosphatase      40 - 150 U/L 178 (H)    ALT      0 - 70 U/L 1417 (HH)    AST      0 - 45 U/L 1494 (HH)    CRP Inflammation      0.0 - 8.0 mg/L 41.4 (H)    Sed Rate      0 - 20 mm/h 39 (H)    CK Total      30 - 300 U/L 92      Component      Latest Ref Rng & Units 4/24/2018   WBC      4.0 - 11.0 10e9/L 10.1   RBC Count      4.4 - 5.9 10e12/L 4.44   Hemoglobin      13.3 - 17.7 g/dL 13.5   Hematocrit      40.0 - 53.0 % 43.2   MCV      78 - 100 fl 97   MCH      26.5 - 33.0 pg 30.4   MCHC      31.5 - 36.5 g/dL 31.3 (L)   RDW      10.0 - 15.0 % 14.0   Platelet Count      150 - 450 10e9/L 207   Diff Method       Automated Method   % Neutrophils      % 78.9   % Lymphocytes      % 7.1   % Monocytes      % 9.1   % Eosinophils      % 3.1   % Basophils      % 0.6   % Immature Granulocytes      % 1.2   Nucleated RBCs      0 /100 0   Absolute Neutrophil      1.6 - 8.3 10e9/L 8.0   Absolute Lymphocytes      0.8 - 5.3 10e9/L 0.7 (L)   Absolute Monocytes      0.0 - 1.3 10e9/L 0.9   Absolute Eosinophils      0.0 - 0.7 10e9/L 0.3   Absolute Basophils      0.0 - 0.2 10e9/L 0.1   Abs Immature Granulocytes      0 - 0.4 10e9/L 0.1   Absolute Nucleated RBC       0.0     Impression:    Seropositive Rheumatoid Arthritis-this is stable with no signs of active synovitis. He tolerates the  hydroxychloroquine well and we will continue this.    Osteoarthritis-with  and spine disease. Tylenol for analgesia.     Radiculopathy-with degenerative spine disease and chronic pain. He is intolerant of gabapentin, and we will therefore threat with Lyrica 25 mg BID.     Osteoporosis-DEXA with final result pending. Continue the fosamax 70 mg weekly dose.    Hepatitis-this has resolved, but follow up testing is planned.     Long term management of immunosuppression-with lab testing today. Immunizations through primary care, but he does not get Flu vaccination. He will pursue shingrix vaccination.    Plan RTC in 6 months.

## 2018-11-06 NOTE — NURSING NOTE
"Chief Complaint   Patient presents with     RECHECK     RA       /85 (BP Location: Right arm, Patient Position: Sitting, Cuff Size: Adult Regular)  Pulse 96  Temp 98  F (36.7  C) (Oral)  Resp 16  Ht 1.702 m (5' 7\")  Wt 66.1 kg (145 lb 11.2 oz)  SpO2 93%  BMI 22.82 kg/m2    Sallie Virk [facility]  11/6/2018 10:21 AM      "

## 2018-11-06 NOTE — LETTER
11/6/2018      RE: J Luis Wolf  [id number] [facility] [id number]        returns for management of RA complicated by ILD. +CCP, +RF, -GIANCARLO. No history of erosions. FVC 39%/DLCO 58% . Previously failed methotrexate, azathioprine, and mycophenolate. Prednisone dependent. Gabapentin failed due to somnolence.     His breathing is intermittently poor and he is currently exercising less due to this. He also reports persistent right leg radiculopathy pain. He failed use of gabapentin due to somnolence.     He continues to do well with his joints. He denies joint pains, increased dysfunction or deformities. Energy is reduced in part due to lack of exercise.     Prednisone 15 mg daily to control lung progression.  Hydroxychlorquine 400 mg daily with stable lab and eye testing. Tylenol 500 mg one tablet four times daily. No Advil at present. He has continues to use occasional excedrin for pain. Alendronate 70 mg once weekly    PMI:  Medical-RA with ILD, atopic rhinitis, hip bursitis and gluteal tendinopathy, osteoporosis with T4-5 vertebral fractures (T = -2.9 ), hyperlipidemia, right rotator cuff disease, DDD/DJD with foraminal stenosis.  Surgical-appendectomy, bunion surgery and toe surgical straightening, right elbow surgery  Injury-right elbow laceration, right knee injury, low back injury    SH:  Retired ,  with two children. Former smoker (5 years with occasional cigarette use), 1 EtOH per day, no illicit drug use. Silica and Right handed.    FH:  Mother lived to very old age, now dead  Father dead at 80 with heart disease and OA  Brother with celiac disease  Children and grandchildren are healthy    PMSF history personally reviewed by me today.    ROS:  +skin flaking lesion  +intermittent paresthesias  +seasonal allergy  Remainder of the 14 point ROS obtained and found negative.    Physical Exam:  Constitutional: WD-WN-WG cooperative; +continuous O2  therapy  Eyes: nl EOM, PERRLA, vision, conjunctiva, sclera  ENT: nl external ears, nose, hearing, lips, teeth, gums, throat  Neck: no mass or thyroid enlargement  Resp: +lungs with bibasilar rales; nl to palpation, nl effort  CV: +irregularly irregular rhythm, no murmurs, rubs or gallops, no edema  GI: no ABD mass or tenderness, no HSM  : not tested  Lymph: no cervical or epitrochlear nodes  MS: +heberden's nodes with diffuse angular deformities; bilateral left>right wrist reduced extension and 1st CMC squaring;  +right shoulder with only minimal 30 degrees active abduction and limited rotation; neck rotation 45 degrees bilaterally and no extension; All other TMJ, neck, shoulder, elbow, wrist, hand, spine, hip, knee, ankle, and foot joints were examined and otherwise found normal. Normal  strength. Intact tuck. Fairly normal prayer.  Skin: no nail pitting, alopecia, rash  Neuro: nl cranial nerves, strength, sensation, no DTRs.   Psych: nl judgement, orientation, memory, affect.    Laboratory:      Copath Report 05/10/2018  9:14 AM 88   Patient Name: BOUCHRA DOMINGUEZ   MR#: [id number]   Specimen #: [id number]   Collected: 5/10/2018   Received: 5/10/2018   Reported: 5/11/2018 11:25   Ordering Phy(s): JUAN MEDINA   Additional Phy(s): LANIA CANTU     For improved result formatting, select 'View Enhanced Report Format' under    Linked Documents section.     SPECIMEN(S):   Liver needle biopsy     FINAL DIAGNOSIS:   Liver, needle biopsy   - Findings consistent with resolving hepatitis with minimal portal   fibrosis   - See microscopic description     I have personally reviewed all specimens and/or slides, including the   listed special stains, and used them   with my medical judgement to determine or confirm the final diagnosis.     Electronically signed out by:     Johnny Burns M.D., [facility]     CLINICAL HISTORY:   78-year-old male with a history of rheumatoid arthritis and interstitial   lung  "disease presents with increased   liver enzymes. On April 30 his AST was 817, ALT 1498, alkaline phosphatase    260 with a total bilirubin of 0.9.   Viral serologies including EBV and CMV are negative. Antinuclear and   F-actin antibodies are negative. The   patient routinely takes 2-3 Tylenol day and is on 15 mg of prednisone on   day.   GROSS:   A: The specimen is received in formalin with proper patient   identification, labeled \"liver biopsy\".  The   specimen consists of five tan cores ranging from 0.2-1.7 cm in length and   averaging 0.1 cm in diameter.  The   specimen is wrapped and entirely submitted in cassette A1.(Dictated by:   Fe COREY [facility] 5/10/2018 10:55   AM)     MICROSCOPIC:   Examination of the trichrome stain reveals liver showing normal   architecture with mild nonbridging portal   fibrosis. A reticulin stain is shows patchy areas of hepatocellular   atrophy without clear-cut collapse. The   hematoxylin and eosin stained slides demonstrate a mild portal infiltrate   of lymphocytes with easily   identified neutrophils and occasional eosinophils. Many portal tracts have    only a few scattered neutrophils.   Plasma cells are not prominent. The hepatocellular lobule does not   demonstrate significant hepatocellular   necrosis or fatty change. There are hepatocellular mitoses present.  There    are only a few small clusters of   lymphocytes present. There is pigment present in Kupffer cells consistent   with possible iron or lipofuscin   from prior necrosis.     The findings in this biopsy are nonspecific and are not consistent with   the laboratory values from April 30.   This would suggest that whatever was occurring on April 30 has partially   resolved at the current time. There   is some portal tract widening but there is no significant fibrosis.   Potential etiologies would include some   type of transient medication or toxic reaction, or a transient viral   infection although the common " viral   agents have been excluded. The possibility of autoimmune hepatitis which   has spontaneously resolved also   cannot be excluded.     CPT Codes:   A: 62248-KV2, 31914-OVFU, 28689-SXRQ     TESTING LAB LOCATION:   [address]   [address] [phone]     COLLECTION SITE:   Client: [facility]   Location: [facility] (B)        Component      Latest Ref Rng & Units 4/27/2018   Bilirubin Direct      0.0 - 0.2 mg/dL 0.4 (H)   Bilirubin Total      0.2 - 1.3 mg/dL 0.7   Albumin      3.4 - 5.0 g/dL 3.4   Protein Total      6.8 - 8.8 g/dL 7.3   Alkaline Phosphatase      40 - 150 U/L 216 (H)   ALT      0 - 70 U/L 1080 (HH)   AST      0 - 45 U/L 646 (HH)   CMV DNA Quantitation Specimen       Plasma, EDTA anticoagulant   CMV Quant IU/mL      CMVND:CMV DNA Not Detected [IU]/mL CMV DNA Not Detected   Log IU/mL of CMVQNT      <2.1 Log:IU/mL Not Calculated   Iron      35 - 180 ug/dL 75   Iron Binding Cap      240 - 430 ug/dL 246   Iron Saturation Index      15 - 46 % 30   EBV DNA Copies/mL      EBVNEG:EBV DNA Not Detected Copies/mL EBV DNA Not Detected   EBV DNA Log of Copies      <2.7 Log:copies/mL Not Calculated   CMV Antibody IgG      0.0 - 0.8 AI <0.2   EBV Capsid Antibody IgG      0.0 - 0.8 AI 6.8 (H)   EBV Capsid Antibody IgM      0.0 - 0.8 AI 0.4   GIANCARLO interpretation      NEG:Negative Negative   Ferritin      26 - 388 ng/mL 1033 (H)   F-Actin Antibody IgG      0 - 19 Units 3   Mitochondrial M2 Antibody IgG      <4 U/mL 1     Component      Latest Ref Rng & Units 4/24/2018   HCV RNA Quant IU/ml      HCVND:HCV RNA Not Detected [IU]/mL HCV RNA Not Detected   Log of HCV RNA Qt      <1.2 Log IU/mL Not Calculated   Hepatitis C Antibody      NR:Nonreactive Nonreactive   Hep B Surface Agn      NR:Nonreactive Nonreactive   Hepatitis B Core IgM      NR:Nonreactive Nonreactive   Hepatitis A IgM Annemarie       NR:Nonreactive Nonreactive     Component      Latest Ref Rng & Units 4/24/2018 4/24/2018          11:38 AM 11:38 AM   Sodium      133 - 144 mmol/L  137   Potassium      3.4 - 5.3 mmol/L  4.1   Chloride      94 - 109 mmol/L  99   Carbon Dioxide      20 - 32 mmol/L  28   Anion Gap      3 - 14 mmol/L  9   Glucose      70 - 99 mg/dL  114 (H)   Urea Nitrogen      7 - 30 mg/dL  18   Creatinine      0.66 - 1.25 mg/dL 0.81 0.81   GFR Estimate      >60 mL/min/1.7m2 >90 >90   GFR Estimate If Black      >60 mL/min/1.7m2 >90 >90   Calcium      8.5 - 10.1 mg/dL  9.3   Bilirubin Direct      0.0 - 0.2 mg/dL 0.4 (H)    Bilirubin Total      0.2 - 1.3 mg/dL 0.8    Albumin      3.4 - 5.0 g/dL 3.2 (L)    Protein Total      6.8 - 8.8 g/dL 7.2    Alkaline Phosphatase      40 - 150 U/L 178 (H)    ALT      0 - 70 U/L 1417 (HH)    AST      0 - 45 U/L 1494 (HH)    CRP Inflammation      0.0 - 8.0 mg/L 41.4 (H)    Sed Rate      0 - 20 mm/h 39 (H)    CK Total      30 - 300 U/L 92      Component      Latest Ref Rng & Units 4/24/2018   WBC      4.0 - 11.0 10e9/L 10.1   RBC Count      4.4 - 5.9 10e12/L 4.44   Hemoglobin      13.3 - 17.7 g/dL 13.5   Hematocrit      40.0 - 53.0 % 43.2   MCV      78 - 100 fl 97   MCH      26.5 - 33.0 pg 30.4   MCHC      31.5 - 36.5 g/dL 31.3 (L)   RDW      10.0 - 15.0 % 14.0   Platelet Count      150 - 450 10e9/L 207   Diff Method       Automated Method   % Neutrophils      % 78.9   % Lymphocytes      % 7.1   % Monocytes      % 9.1   % Eosinophils      % 3.1   % Basophils      % 0.6   % Immature Granulocytes      % 1.2   Nucleated RBCs      0 /100 0   Absolute Neutrophil      1.6 - 8.3 10e9/L 8.0   Absolute Lymphocytes      0.8 - 5.3 10e9/L 0.7 (L)   Absolute Monocytes      0.0 - 1.3 10e9/L 0.9   Absolute Eosinophils      0.0 - 0.7 10e9/L 0.3   Absolute Basophils      0.0 - 0.2 10e9/L 0.1   Abs Immature Granulocytes      0 - 0.4 10e9/L 0.1   Absolute Nucleated RBC       0.0     Impression:    Seropositive  Rheumatoid Arthritis-this is stable with no signs of active synovitis. He tolerates the hydroxychloroquine well and we will continue this.    Osteoarthritis-with  and spine disease. Tylenol for analgesia.     Radiculopathy-with degenerative spine disease and chronic pain. He is intolerant of gabapentin, and we will therefore threat with Lyrica 25 mg BID.     Osteoporosis-DEXA with final result pending. Continue the fosamax 70 mg weekly dose.    Hepatitis-this has resolved, but follow up testing is planned.     Long term management of immunosuppression-with lab testing today. Immunizations through primary care, but he does not get Flu vaccination. He will pursue shingrix vaccination.    Plan RTC in 6 months.      Edu Roberts MD

## 2018-11-06 NOTE — LETTER
11/6/2018       RE: J Luis Wolf  [address] [facility] [address]     Dear Colleague,    Thank you for referring your patient, J Luis Wolf, to the [facility] RHEUMATOLOGY at [facility]. Please see a copy of my visit note below.     returns for management of RA complicated by ILD. +CCP, +RF, -GIANCARLO. No history of erosions. FVC 39%/DLCO 58% . Previously failed methotrexate, azathioprine, and mycophenolate. Prednisone dependent. Gabapentin failed due to somnolence.     His breathing is intermittently poor and he is currently exercising less due to this. He also reports persistent right leg radiculopathy pain. He failed use of gabapentin due to somnolence.     He continues to do well with his joints. He denies joint pains, increased dysfunction or deformities. Energy is reduced in part due to lack of exercise.     Prednisone 15 mg daily to control lung progression.  Hydroxychlorquine 400 mg daily with stable lab and eye testing. Tylenol 500 mg one tablet four times daily. No Advil at present. He has continues to use occasional excedrin for pain. Alendronate 70 mg once weekly    PMI:  Medical-RA with ILD, atopic rhinitis, hip bursitis and gluteal tendinopathy, osteoporosis with T4-5 vertebral fractures (T = -2.9 ), hyperlipidemia, right rotator cuff disease, DDD/DJD with foraminal stenosis.  Surgical-appendectomy, bunion surgery and toe surgical straightening, right elbow surgery  Injury-right elbow laceration, right knee injury, low back injury    SH:  Retired ,  with two children. Former smoker (5 years with occasional cigarette use), 1 EtOH per day, no illicit drug use. Silica and Right handed.    FH:  Mother lived to very old age, now dead  Father dead at 80 with heart disease and OA  Brother with celiac disease  Children and grandchildren are healthy    PMSF history personally reviewed by me today.    ROS:  +skin  flaking lesion  +intermittent paresthesias  +seasonal allergy  Remainder of the 14 point ROS obtained and found negative.    Physical Exam:  Constitutional: WD-WN-WG cooperative; +continuous O2 therapy  Eyes: nl EOM, PERRLA, vision, conjunctiva, sclera  ENT: nl external ears, nose, hearing, lips, teeth, gums, throat  Neck: no mass or thyroid enlargement  Resp: +lungs with bibasilar rales; nl to palpation, nl effort  CV: +irregularly irregular rhythm, no murmurs, rubs or gallops, no edema  GI: no ABD mass or tenderness, no HSM  : not tested  Lymph: no cervical or epitrochlear nodes  MS: +heberden's nodes with diffuse angular deformities; bilateral left>right wrist reduced extension and 1st CMC squaring;  +right shoulder with only minimal 30 degrees active abduction and limited rotation; neck rotation 45 degrees bilaterally and no extension; All other TMJ, neck, shoulder, elbow, wrist, hand, spine, hip, knee, ankle, and foot joints were examined and otherwise found normal. Normal  strength. Intact tuck. Fairly normal prayer.  Skin: no nail pitting, alopecia, rash  Neuro: nl cranial nerves, strength, sensation, no DTRs.   Psych: nl judgement, orientation, memory, affect.    Laboratory:      Saravanan Report 05/10/2018  9:14 AM 88   Patient Name: BOUCHRA DOMINGUEZ   MR#: [id number]   Specimen #: [id number]   Collected: 5/10/2018   Received: 5/10/2018   Reported: 5/11/2018 11:25   Ordering Phy(s): JUAN MEDINA   Additional Phy(s): LAINA CANTU     For improved result formatting, select 'View Enhanced Report Format' under    Linked Documents section.     SPECIMEN(S):   Liver needle biopsy     FINAL DIAGNOSIS:   Liver, needle biopsy   - Findings consistent with resolving hepatitis with minimal portal   fibrosis   - See microscopic description     I have personally reviewed all specimens and/or slides, including the   listed special stains, and used them   with my medical judgement to determine or confirm  "the final diagnosis.     Electronically signed out by:     Johnny Burns M.D., [facility]     CLINICAL HISTORY:   78-year-old male with a history of rheumatoid arthritis and interstitial   lung disease presents with increased   liver enzymes. On April 30 his AST was 817, ALT 1498, alkaline phosphatase    260 with a total bilirubin of 0.9.   Viral serologies including EBV and CMV are negative. Antinuclear and   F-actin antibodies are negative. The   patient routinely takes 2-3 Tylenol day and is on 15 mg of prednisone on   day.   GROSS:   A: The specimen is received in formalin with proper patient   identification, labeled \"liver biopsy\".  The   specimen consists of five tan cores ranging from 0.2-1.7 cm in length and   averaging 0.1 cm in diameter.  The   specimen is wrapped and entirely submitted in cassette A1.(Dictated by:   Fe COREY [facility] 5/10/2018 10:55   AM)     MICROSCOPIC:   Examination of the trichrome stain reveals liver showing normal   architecture with mild nonbridging portal   fibrosis. A reticulin stain is shows patchy areas of hepatocellular   atrophy without clear-cut collapse. The   hematoxylin and eosin stained slides demonstrate a mild portal infiltrate   of lymphocytes with easily   identified neutrophils and occasional eosinophils. Many portal tracts have    only a few scattered neutrophils.   Plasma cells are not prominent. The hepatocellular lobule does not   demonstrate significant hepatocellular   necrosis or fatty change. There are hepatocellular mitoses present.  There    are only a few small clusters of   lymphocytes present. There is pigment present in Kupffer cells consistent   with possible iron or lipofuscin   from prior necrosis.     The findings in this biopsy are nonspecific and are not consistent with   the laboratory values from April 30.   This would suggest that whatever was occurring on April 30 has partially   resolved at the current time. There   is some portal " tract widening but there is no significant fibrosis.   Potential etiologies would include some   type of transient medication or toxic reaction, or a transient viral   infection although the common viral   agents have been excluded. The possibility of autoimmune hepatitis which   has spontaneously resolved also   cannot be excluded.     CPT Codes:   A: 26698-QY2, 42946-TLKF, 04170-XOHP     TESTING LAB LOCATION:   [facility], [address]   [address] [phone]     COLLECTION SITE:   Client: [facility]   Location: [facility] (B)        Component      Latest Ref Rng & Units 4/27/2018   Bilirubin Direct      0.0 - 0.2 mg/dL 0.4 (H)   Bilirubin Total      0.2 - 1.3 mg/dL 0.7   Albumin      3.4 - 5.0 g/dL 3.4   Protein Total      6.8 - 8.8 g/dL 7.3   Alkaline Phosphatase      40 - 150 U/L 216 (H)   ALT      0 - 70 U/L 1080 (HH)   AST      0 - 45 U/L 646 (HH)   CMV DNA Quantitation Specimen       Plasma, EDTA anticoagulant   CMV Quant IU/mL      CMVND:CMV DNA Not Detected [IU]/mL CMV DNA Not Detected   Log IU/mL of CMVQNT      <2.1 Log:IU/mL Not Calculated   Iron      35 - 180 ug/dL 75   Iron Binding Cap      240 - 430 ug/dL 246   Iron Saturation Index      15 - 46 % 30   EBV DNA Copies/mL      EBVNEG:EBV DNA Not Detected Copies/mL EBV DNA Not Detected   EBV DNA Log of Copies      <2.7 Log:copies/mL Not Calculated   CMV Antibody IgG      0.0 - 0.8 AI <0.2   EBV Capsid Antibody IgG      0.0 - 0.8 AI 6.8 (H)   EBV Capsid Antibody IgM      0.0 - 0.8 AI 0.4   GIANCARLO interpretation      NEG:Negative Negative   Ferritin      26 - 388 ng/mL 1033 (H)   F-Actin Antibody IgG      0 - 19 Units 3   Mitochondrial M2 Antibody IgG      <4 U/mL 1     Component      Latest Ref Rng & Units 4/24/2018   HCV RNA Quant IU/ml      HCVND:HCV RNA Not Detected [IU]/mL HCV RNA Not Detected   Log of HCV RNA Qt      <1.2 Log IU/mL  Not Calculated   Hepatitis C Antibody      NR:Nonreactive Nonreactive   Hep B Surface Agn      NR:Nonreactive Nonreactive   Hepatitis B Core IgM      NR:Nonreactive Nonreactive   Hepatitis A IgM Annemarie      NR:Nonreactive Nonreactive     Component      Latest Ref Rng & Units 4/24/2018 4/24/2018          11:38 AM 11:38 AM   Sodium      133 - 144 mmol/L  137   Potassium      3.4 - 5.3 mmol/L  4.1   Chloride      94 - 109 mmol/L  99   Carbon Dioxide      20 - 32 mmol/L  28   Anion Gap      3 - 14 mmol/L  9   Glucose      70 - 99 mg/dL  114 (H)   Urea Nitrogen      7 - 30 mg/dL  18   Creatinine      0.66 - 1.25 mg/dL 0.81 0.81   GFR Estimate      >60 mL/min/1.7m2 >90 >90   GFR Estimate If Black      >60 mL/min/1.7m2 >90 >90   Calcium      8.5 - 10.1 mg/dL  9.3   Bilirubin Direct      0.0 - 0.2 mg/dL 0.4 (H)    Bilirubin Total      0.2 - 1.3 mg/dL 0.8    Albumin      3.4 - 5.0 g/dL 3.2 (L)    Protein Total      6.8 - 8.8 g/dL 7.2    Alkaline Phosphatase      40 - 150 U/L 178 (H)    ALT      0 - 70 U/L 1417 (HH)    AST      0 - 45 U/L 1494 (HH)    CRP Inflammation      0.0 - 8.0 mg/L 41.4 (H)    Sed Rate      0 - 20 mm/h 39 (H)    CK Total      30 - 300 U/L 92      Component      Latest Ref Rng & Units 4/24/2018   WBC      4.0 - 11.0 10e9/L 10.1   RBC Count      4.4 - 5.9 10e12/L 4.44   Hemoglobin      13.3 - 17.7 g/dL 13.5   Hematocrit      40.0 - 53.0 % 43.2   MCV      78 - 100 fl 97   MCH      26.5 - 33.0 pg 30.4   MCHC      31.5 - 36.5 g/dL 31.3 (L)   RDW      10.0 - 15.0 % 14.0   Platelet Count      150 - 450 10e9/L 207   Diff Method       Automated Method   % Neutrophils      % 78.9   % Lymphocytes      % 7.1   % Monocytes      % 9.1   % Eosinophils      % 3.1   % Basophils      % 0.6   % Immature Granulocytes      % 1.2   Nucleated RBCs      0 /100 0   Absolute Neutrophil      1.6 - 8.3 10e9/L 8.0   Absolute Lymphocytes      0.8 - 5.3 10e9/L 0.7 (L)   Absolute Monocytes      0.0 - 1.3 10e9/L 0.9   Absolute  Eosinophils      0.0 - 0.7 10e9/L 0.3   Absolute Basophils      0.0 - 0.2 10e9/L 0.1   Abs Immature Granulocytes      0 - 0.4 10e9/L 0.1   Absolute Nucleated RBC       0.0     Impression:    Seropositive Rheumatoid Arthritis-this is stable with no signs of active synovitis. He tolerates the hydroxychloroquine well and we will continue this.    Osteoarthritis-with  and spine disease. Tylenol for analgesia.     Radiculopathy-with degenerative spine disease and chronic pain. He is intolerant of gabapentin, and we will therefore threat with Lyrica 25 mg BID.     Osteoporosis-DEXA with final result pending. Continue the fosamax 70 mg weekly dose.    Hepatitis-this has resolved, but follow up testing is planned.     Long term management of immunosuppression-with lab testing today. Immunizations through primary care, but he does not get Flu vaccination. He will pursue shingrix vaccination.    Plan RTC in 6 months.      Again, thank you for allowing me to participate in the care of your patient.      Sincerely,    Edu Roberts MD

## 2018-11-06 NOTE — MR AVS SNAPSHOT
After Visit Summary   11/6/2018    J Luis Wolf    MRN: [id number]           Patient Information     Date Of Birth          1940        Visit Information        Provider Department      11/6/2018 10:30 AM Edu Roberts MD [facility] Rheumatology        Today's Diagnoses     Rheumatoid arthritis involving multiple sites with positive rheumatoid factor (H)    -  1    Interstitial lung disease (H)        Long-term use of immunosuppressant medication        ILD (interstitial lung disease) (H)        Disorder of bone and cartilage        Osteoporosis without current pathological fracture, unspecified osteoporosis type           Follow-ups after your visit        Follow-up notes from your care team     Return for f/u in April.      Your next 10 appointments already scheduled     Nov 06, 2018 11:45 AM CST   Lab with  LAB   [facility] Lab ([facility])    [address] [place] [id number] [phone]            Dec 05, 2018  9:45 AM CST   US ABDOMEN COMPLETE with UCUS2   [facility] Imaging Center US ([facility])    [address] [place] [id number] [phone]           How do I prepare for my exam? (Food and drink instructions) Adults: No eating, smoking, gum chewing or drinking for 8 hours before the exam. You may take medicine with a small sip of water.  Children: * Infants, breast-fed: may have breast milk up to 2 hours before exam. * Infants, formula: may have bottle until 4 hours before exam. * Children 1-5 years: No food or drink for 4 hours before exam. * Children 6 -12 years: No food or drink for 6 hours before exam. * Children over 12 years: No food or drink for 8 hours before exam.  * J Tube Fed: No need to stop feedings.  What should I wear: Wear comfortable clothes.  How long does the exam take: Most ultrasounds take 30 to 60 minutes.  What should I bring: Bring a list of your  medicines, including vitamins, minerals and over-the-counter drugs. It is safest to leave personal items at home.  Do I need a :  No  is needed.  What do I need to tell my doctor: Tell your doctor about any allergies you may have.  What should I do after the exam: No restrictions, You may resume normal activities.  What is this test: An ultrasound uses sound waves to make pictures of the body. Sound waves do not cause pain. The only discomfort may be the pressure of the wand against your skin or full bladder.  Who should I call with questions: If you have any questions, please call the Imaging Department where you will have your exam. Directions, parking instructions, and other information is available on our website, [email or web address].            Dec 07, 2018 10:00 AM CST   FULL PULMONARY FUNCTION with  PFL B   [facility] Pulmonary Function Testing ([facility])    [address]  3rd Floor  [facility] [id number] [phone]            Dec 07, 2018 11:00 AM CST   (Arrive by 10:45 AM)   Return Interstitial Lung with Nickie Millan MD   [facility] ([facility])    [address]  [address]  [facility] [id number] [phone]            Apr 23, 2019  9:00 AM CDT   (Arrive by 8:45 AM)   Return Visit with Edu Roberts MD   [facility] Rheumatology ([facility])    [address] [place] [address]  [address]  [facility] [id number] [phone]              Who to contact     If you have questions or need follow up information about today's clinic visit or your schedule please contact [facility] RHEUMATOLOGY directly at [phone].  Normal or non-critical lab and imaging results will be communicated to you by MyChart, letter or phone within 4 business days after the clinic has received the results. If you do not hear from us within 7 days, please contact the clinic through [email or web address] or  "phone. If you have a critical or abnormal lab result, we will notify you by phone as soon as possible.  Submit refill requests through [employer] or call your pharmacy and they will forward the refill request to us. Please allow 3 business days for your refill to be completed.          Additional Information About Your Visit        [employer] Information     [employer] gives you secure access to your electronic health record. If you see a primary care provider, you can also send messages to your care team and make appointments. If you have questions, please call your primary care clinic.  If you do not have a primary care provider, please call [phone] and they will assist you.        Care EveryWhere ID     This is your Care EveryWhere ID. This could be used by other organizations to access your [place] medical records  [id number]        Your Vitals Were     Pulse Temperature Respirations Height Pulse Oximetry BMI (Body Mass Index)    96 98  F (36.7  C) (Oral) 16 1.702 m (5' 7\") 93% 22.82 kg/m2       Blood Pressure from Last 3 Encounters:   11/06/18 130/85   08/22/18 137/84   05/10/18 (!) 142/94    Weight from Last 3 Encounters:   11/06/18 66.1 kg (145 lb 11.2 oz)   08/22/18 67.2 kg (148 lb 1.6 oz)   05/10/18 68.9 kg (152 lb)              We Performed the Following     CBC with platelets differential     Comprehensive metabolic panel     CRP inflammation     Erythrocyte sedimentation rate auto     UA with Microscopic          Today's Medication Changes          These changes are accurate as of 11/6/18 11:33 AM.  If you have any questions, ask your nurse or doctor.               Start taking these medicines.        Dose/Directions    pregabalin 25 MG capsule   Commonly known as:  LYRICA   Used for:  Interstitial lung disease (H), Long-term use of immunosuppressant medication, Rheumatoid arthritis involving multiple sites with positive rheumatoid factor (H), ILD (interstitial lung disease) (H), Disorder of bone and " cartilage, Osteoporosis without current pathological fracture, unspecified osteoporosis type   Started by:  Edu Roberts MD        Take 1 tablet by mouth daily for 1 week, then increase to 1 tablet twice daily   Quantity:  60 capsule   Refills:  5            Where to get your medicines      These medications were sent to [facility] [id number] [place] [address] - [address] [facility] [address] [address]  [address], [facility] [address]     Phone:  [phone]     alendronate 70 MG tablet    hydroxychloroquine 200 MG tablet         Some of these will need a paper prescription and others can be bought over the counter.  Ask your nurse if you have questions.     Bring a paper prescription for each of these medications     pregabalin 25 MG capsule                Primary Care Provider Office Phone # Fax #    Mario Foster [phone]       [facility] [place] [address]  [place] [address]        Equal Access to Services     HARPREET MARCANO : Hadii aad ku hadasho Soomaali, waaxda luqadaha, qaybta kaalmada adeegyada, waxay idiin hayaan jayda bullock . So [facility] [phone].    ATENCIÓN: Si habla español, tiene a rivas disposición servicios gratuitos de asistencia lingüística. [facility] [phone].    We comply with applicable federal civil rights laws and [place] laws. We do not discriminate on the basis of race, color, national origin, age, disability, sex, sexual orientation, or gender identity.            Thank you!     Thank you for choosing [facility] RHEUMATOLOGY  for your care. Our goal is always to provide you with excellent care. Hearing back from our patients is one way we can continue to improve our services. Please take a few minutes to complete the written survey that you may receive in the mail after your visit with us. Thank you!             Your Updated Medication List - Protect others around you: Learn how to safely use, store and throw away your  medicines at www.disposemymeds.org.          This list is accurate as of 11/6/18 11:33 AM.  Always use your most recent med list.                   Brand Name Dispense Instructions for use Diagnosis    ADVIL PO      Take 200 mg by mouth        alendronate 70 MG tablet    FOSAMAX    12 tablet    Take 1 tablet (70 mg) by mouth every 7 days    ILD (interstitial lung disease) (H), Long-term use of immunosuppressant medication, Rheumatoid arthritis involving multiple sites with positive rheumatoid factor (H), Disorder of bone and cartilage, Osteoporosis without current pathological fracture, unspecified osteoporosis type, Interstitial lung disease (H)       azelastine 0.1 % nasal spray    ASTELIN     Spray 1 spray into both nostrils 2 times daily        benzonatate 100 MG capsule    TESSALON    180 capsule    Take 1 capsule (100 mg) by mouth 3 times daily as needed for cough    ILD (interstitial lung disease) (H)       cetirizine 10 MG tablet    zyrTEC     Take 10 mg by mouth daily        D3-1000 PO      Take by mouth daily        DAILY MULTIVITAMIN PO      Take 2 tablets by mouth every morning        hydroxychloroquine 200 MG tablet    PLAQUENIL    180 tablet    Take 2 tablets (400 mg) by mouth daily    ILD (interstitial lung disease) (H), Long-term use of immunosuppressant medication, Rheumatoid arthritis involving multiple sites with positive rheumatoid factor (H), Disorder of bone and cartilage, Osteoporosis without current pathological fracture, unspecified osteoporosis type, Interstitial lung disease (H)       LIPITOR 10 MG tablet   Generic drug:  atorvastatin      Take 5 mg by mouth every other day    Interstitial lung disease (H), Long-term use of immunosuppressant medication, Rheumatoid arthritis involving multiple sites with positive rheumatoid factor (H), ILD (interstitial lung disease) (H), Disorder of bone and cartilage, Osteoporosis without current pathological fracture, unspecified osteoporosis type        losartan 25 MG tablet    COZAAR     Take 25 mg by mouth        MUCINEX DM PO           * predniSONE 1 MG tablet    DELTASONE    360 tablet    Patient takes 14 mg daily take 4 1mg tabs by mouth daily with 1 10mg tab.    ILD (interstitial lung disease) (H)       * predniSONE 5 MG tablet    DELTASONE    45 tablet    Take 3 tablets (15 mg) by mouth daily    ILD (interstitial lung disease) (H)       * predniSONE 10 MG tablet    DELTASONE    90 tablet    Take 1 tablet (10 mg) by mouth daily Patient takes a total dose of 13mg daily    ILD (interstitial lung disease) (H)       * predniSONE 5 MG tablet    DELTASONE    90 tablet    Take 1 tablet (5 mg) by mouth daily    ILD (interstitial lung disease) (H)       pregabalin 25 MG capsule    LYRICA    60 capsule    Take 1 tablet by mouth daily for 1 week, then increase to 1 tablet twice daily    Interstitial lung disease (H), Long-term use of immunosuppressant medication, Rheumatoid arthritis involving multiple sites with positive rheumatoid factor (H), ILD (interstitial lung disease) (H), Disorder of bone and cartilage, Osteoporosis without current pathological fracture, unspecified osteoporosis type       SM CALCIUM CITRATE+/VIT D3 PO      Take by mouth 2 times daily 630/500iu        sulfamethoxazole-trimethoprim 800-160 MG per tablet    BACTRIM DS/SEPTRA DS    36 tablet    Take 1 tablet by mouth Every Mon, Wed, Fri Morning 400-80    ILD (interstitial lung disease) (H)       * Notice:  This list has 4 medication(s) that are the same as other medications prescribed for you. Read the directions carefully, and ask your doctor or other care provider to review them with you.

## 2018-12-07 NOTE — LETTER
12/7/2018      RE: J Luis Wolf  [address]  [facility] [address]       [facility] Inter[facility] Disease [facility]    Reason for Visit  J Luis Wolf is a 78 year old year old male who is being seen for RECHECK (Interstitial Lung RA/ILD)    HPI    Mr. Wolf is a 78-year-old with rheumatoid arthritis associated interstitial lung disease who is here for follow-up.  He was diagnosed with rheumatoid arthritis in 2012, and he has had ILD on his chest CT scans since 2013.  He was treated briefly with mycophenolate starting December 2014, but it was stopped in 2015 due to anorexia and 20 pound weight loss.  Imuran was tried, but he did not tolerate due to chills and fevers.  He has been on prednisone since that time for control of the arthritis as well as the RA ILD.  Most recently, Dr. 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  It made him feel weak and sleepy, and it was stopped 3 days ago.  He does have pain in his upper back from an old compression fracture.  He finished pulmonary rehab and is now doing the Wellness program.  He is only going once per week but plans to go 2 times per week.  He does the treadmill, bike, and elliptical and feels no shortness of breath when he exercises.  He uses oxygen 4 L with activity, 3 L otherwise.  He also does arm weights and back back exercises when he does not go to the Wellness program.  He and his wife plan to go to [place] for the winter from January to April.    He continues to endorse of shortness of breath when he walks uphill and sometimes when he walks up one flight of stairs.  Overall, he has noticed no change in his pulmonary symptoms.  He is allergic to the flu vaccine.  He had an echocardiogram in August of this year that was normal.  Chest CT scan in August of this year showed some minimal increase in the pulmonary fibrosis.        Current Outpatient Prescriptions    Medication     Acetaminophen (TYLENOL PO)     alendronate (FOSAMAX) 70 MG tablet     atorvastatin (LIPITOR) 10 MG tablet     azelastine (ASTELIN) 137 MCG/SPRAY nasal spray     benzonatate (TESSALON) 100 MG capsule     Calcium Citrate-Vitamin D (SM CALCIUM CITRATE+/VIT D3 PO)     cetirizine (ZYRTEC) 10 MG tablet     Cholecalciferol (D3-1000 PO)     Dextromethorphan-Guaifenesin (MUCINEX DM PO)     hydroxychloroquine (PLAQUENIL) 200 MG tablet     losartan (COZAAR) 25 MG tablet     Multiple Vitamin (DAILY MULTIVITAMIN PO)     predniSONE (DELTASONE) 10 MG tablet     predniSONE (DELTASONE) 5 MG tablet     sulfamethoxazole-trimethoprim (BACTRIM DS/SEPTRA DS) 800-160 MG per tablet     No current facility-administered medications for this visit.      Allergies   Allergen Reactions     Flu Virus Vaccine      Swollen face     Pistachios [Nuts] Swelling and Cough     Not tested medically     Past Medical History:   Diagnosis Date     ILD (interstitial lung disease) (H)     associated with rheumatoid arthritis, present on CT since 2013, methotrexate stopped 7/2013, started MMF 12/2014 but stopped 2015 due to 20 lb weight loss and anorexia.  Did not tolerate imuran due to chills/fevers.     Rheumatoid arthritis (H)     RA dx 2012, + CCP and RF.       Past Surgical History:   Procedure Laterality Date     APPENDECTOMY  1956     BACK SURGERY  2014    Compression fx thoracic vertebra     COLONOSCOPY  2014     ORTHOPEDIC SURGERY  2015    Bunionectomy lt foot     PERCUTANEOUS BIOPSY LIVER N/A 5/10/2018    Procedure: PERCUTANEOUS BIOPSY LIVER;  Percutaneous Liver Biopsy;  Surgeon: Raymon Gann PA-C;  Location: [facility] OR     SOFT TISSUE SURGERY  2015    Bursitis rt hip cortisone injection 11 15 2015     THORACIC SURGERY  2012    Pulmonary fibrosis       Social History     Social History     Marital status:      Spouse name: N/A     Number of children: N/A     Years of education: N/A     Occupational History     Not on  "file.     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       Family History   Problem Relation Age of Onset     Coronary Artery Disease Father      Age 77  age 80     Hyperlipidemia Father      Hypertension Mother       age 105     Rheumatoid Arthritis No family hx of              ROS Pulmonary  A complete ROS was otherwise negative except as noted in the HPI.    Vitals: /83  Pulse 84  Resp 16  Ht 1.702 m (5' 7\")  Wt 68 kg (150 lb)  SpO2 97%  BMI 23.49 kg/m2    Exam:   GENERAL APPEARANCE: Well developed, well nourished, alert, and in no apparent distress.  RESP: good air flow throughout.  Bibasilar inspiratory crackles. No rhonchi. No wheezes.  CV: Normal S1, S2, regular rhythm, normal rate. No murmur.  No LE edema.   MS: extremities normal. No clubbing. No cyanosis.  SKIN: no rash on limited exam.  NEURO: Mentation intact, speech normal, normal gait and stance.  PSYCH: mentation appears normal. and affect normal/bright.    Results:  Recent Results (from the past 168 hour(s))   General PFT Lab (Please always keep checked)    Collection Time: 18 10:00 AM   Result Value Ref Range    FVC-Pred 3.41 L    FVC-Pre 1.01 L    FVC-%Pred-Pre 29 %    FEV1-Pre 0.73 L    FEV1-%Pred-Pre 28 %    FEV1FVC-Pred 72 %    FEV1FVC-Pre 73 %    FEFMax-Pred 6.50 L/sec    FEFMax-Pre 3.50 L/sec    FEFMax-%Pred-Pre 53 %    FEF2575-Pred 1.88 L/sec    FEF2575-Pre 0.48 L/sec    CHF5998-%Pred-Pre 25 %    ExpTime-Pre 6.95 sec    FIFMax-Pre 2.84 L/sec    VC-Pred 3.86 L    " VC-Pre 1.16 L    VC-%Pred-Pre 30 %    IC-Pred 3.00 L    IC-Pre 0.87 L    IC-%Pred-Pre 29 %    ERV-Pred 0.86 L    ERV-Pre 0.29 L    ERV-%Pred-Pre 34 %    FEV1FEV6-Pred 76 %    FEV1FEV6-Pre 73 %    FRCPleth-Pred 3.54 L    FRCPleth-Pre 1.73 L    FRCPleth-%Pred-Pre 48 %    RVPleth-Pred 2.70 L    RVPleth-Pre 1.43 L    RVPleth-%Pred-Pre 53 %    TLCPleth-Pred 6.31 L    TLCPleth-Pre 2.60 L    TLCPleth-%Pred-Pre 41 %    DLCOunc-Pred 22.45 ml/min/mmHg    DLCOunc-Pre 9.88 ml/min/mmHg    DLCOunc-%Pred-Pre 44 %    DLCOcor-Pre 10.21 ml/min/mmHg    DLCOcor-%Pred-Pre 45 %    VA-Pre 2.30 L    VA-%Pred-Pre 38 %    FEV1SVC-Pred 66 %    FEV1SVC-Pre 63 %       I reviewed primary function test that was performed today.  This shows severe restriction with a moderate diffusion defect.  Diffusion and TLC are stable.  Spirometry has declined compared to last year.  My review of the CT scan from August shows some air trapping on expiratory images.    I reviewed results with the patient.      Assessment and plan:   Mr. Wolf is a 78-year-old with rheumatoid arthritis associated ILD who is here for follow-up.  1.  RA ILD.  His dyspnea on exertion is stable.  However, there has been a decline in spirometry this year despite stability of diffusion and total lung capacity.  Decline in spirometry could be related to poor posture and compression fractures..  There has been no significant worsening of his ILD at least by chest CT.  Echocardiogram showed normal function and no indirect evidence of pulmonary hypertension in August.  CT scan by my review showed some air trapping.  If his symptoms worsen, then I would treat his small airways disease with either an ICS/LABA combination or daily azithromycin.  Mycophenolate was stopped in 2015 because of anorexia and 20 pound weight loss.  He will continue prednisone 15 mg daily for the ILD.  I encouraged him to start regular exercise with daily walks when he goes to [place] for the winter.  He will  return to clinic in April when he comes back from [place].  We will recheck full PFT at that time.  2.  Exertional hypoxia.  He will continue oxygen 4 L/min with activity and 3L/min otherwise.  3.  Healthcare maintenance.  He is allergic to the flu vaccine because he had a significant reaction to the swine flu vaccine in the 1970s.        Nickie Millan MD

## 2018-12-07 NOTE — NURSING NOTE
Chief Complaint   Patient presents with     RECHECK     Interstitial Lung RA/ILD    Natalia Vargas, CMA

## 2018-12-07 NOTE — MR AVS SNAPSHOT
After Visit Summary   12/7/2018    J Luis Wolf    MRN: [id number]           Patient Information     Date Of Birth          1940        Visit Information        Provider Department      12/7/2018 11:00 AM Nickie Millan MD [facility]        Today's Diagnoses     Interstitial lung disease (H)    -  1       Follow-ups after your visit        Follow-up notes from your care team     Return in about 4 months (around 4/7/2019).      Your next 10 appointments already scheduled     Apr 19, 2019 10:00 AM CDT   SIX MINUTE WALK with [facility] PFL C, UC PFL 6 MINUTE WALK 2   [facility] Pulmonary Function Testing ([facility])    [address]  3rd Floor  [place] [facility] [place] [id number] [phone]            Apr 19, 2019 11:30 AM CDT   (Arrive by 11:15 AM)   Return Interstitial Lung with Nickie Millan MD   [facility] ([facility])    [address]  [address]  [place] [id number] [phone]            Apr 23, 2019  9:00 AM CDT   (Arrive by 8:45 AM)   Return Visit with Edu Roberts MD   [facility] Rheumatology ([facility])    [address]  [address]  [place] [id number] [phone]              Future tests that were ordered for you today     Open Future Orders        Priority Expected Expires Ordered    General PFT Lab (Please always keep checked) Routine 4/7/2019 12/7/2019 12/7/2018    Pulmonary Function Test Routine 4/7/2019 12/7/2019 12/7/2018    6 minute walk test Routine 4/7/2019 12/7/2019 12/7/2018            Who to contact     If you have questions or need follow up information about today's clinic visit or your schedule please contact [facility] directly at [phone].  Normal or non-critical lab and imaging results will be communicated to you by MyChart, letter or phone within 4 business days  "after the clinic has received the results. If you do not hear from us within 7 days, please contact the clinic through [employer] or phone. If you have a critical or abnormal lab result, we will notify you by phone as soon as possible.  Submit refill requests through [employer] or call your pharmacy and they will forward the refill request to us. Please allow 3 business days for your refill to be completed.          Additional Information About Your Visit        Tripnary Information     [employer] gives you secure access to your electronic health record. If you see a primary care provider, you can also send messages to your care team and make appointments. If you have questions, please call your primary care clinic.  If you do not have a primary care provider, please call [phone] and they will assist you.        Care EveryWhere ID     This is your Care EveryWhere ID. This could be used by other organizations to access your [place] medical records  [id number]        Your Vitals Were     Pulse Respirations Height Pulse Oximetry BMI (Body Mass Index)       84 16 1.702 m (5' 7\") 97% 23.49 kg/m2        Blood Pressure from Last 3 Encounters:   12/07/18 131/83   11/06/18 130/85   08/22/18 137/84    Weight from Last 3 Encounters:   12/07/18 68 kg (150 lb)   11/06/18 66.1 kg (145 lb 11.2 oz)   08/22/18 67.2 kg (148 lb 1.6 oz)                 Today's Medication Changes          These changes are accurate as of 12/7/18  3:12 PM.  If you have any questions, ask your nurse or doctor.               These medicines have changed or have updated prescriptions.        Dose/Directions    * predniSONE 5 MG tablet   Commonly known as:  DELTASONE   This may have changed:  Another medication with the same name was removed. Continue taking this medication, and follow the directions you see here.   Used for:  ILD (interstitial lung disease) (H)   Changed by:  Nickie Millan MD        Dose:  15 mg   Take 3 tablets (15 mg) by mouth daily "   Quantity:  45 tablet   Refills:  3       * predniSONE 10 MG tablet   Commonly known as:  DELTASONE   This may have changed:  Another medication with the same name was removed. Continue taking this medication, and follow the directions you see here.   Used for:  ILD (interstitial lung disease) (H)   Changed by:  Nickie Millan MD        Dose:  10 mg   Take 1 tablet (10 mg) by mouth daily Patient takes a total dose of 13mg daily   Quantity:  90 tablet   Refills:  3       * Notice:  This list has 2 medication(s) that are the same as other medications prescribed for you. Read the directions carefully, and ask your doctor or other care provider to review them with you.      Stop taking these medicines if you haven't already. Please contact your care team if you have questions.     ADVIL PO   Stopped by:  Nickie Millan MD           pregabalin 25 MG capsule   Commonly known as:  LYRICA   Stopped by:  Nickie Millan MD                    Primary Care Provider Office Phone # Fax #    [facility] [phone]       [facility] [address] [address]        Equal Access to Services     [facility]: Hadii aad ku hadasho Soomaali, waaxda luqadaha, qaybta kaalmada adeegyastephanie, jose alberto bullock . So [facility] [phone].    ATENCIÓN: Si habla español, tiene a rivas disposición servicios gratuitos de asistencia lingüística. [facility] [phone].    We comply with applicable federal civil rights laws and [place] laws. We do not discriminate on the basis of race, color, national origin, age, disability, sex, sexual orientation, or gender identity.            Thank you!     Thank you for choosing [facility]  for your care. Our goal is always to provide you with excellent care. Hearing back from our patients is one way we can continue to improve our services. Please take a few minutes to complete the written survey that you may receive in the  mail after your visit with us. Thank you!             Your Updated Medication List - Protect others around you: Learn how to safely use, store and throw away your medicines at www.disposemymeds.org.          This list is accurate as of 12/7/18  3:12 PM.  Always use your most recent med list.                   Brand Name Dispense Instructions for use Diagnosis    alendronate 70 MG tablet    FOSAMAX    12 tablet    Take 1 tablet (70 mg) by mouth every 7 days    ILD (interstitial lung disease) (H), Long-term use of immunosuppressant medication, Rheumatoid arthritis involving multiple sites with positive rheumatoid factor (H), Disorder of bone and cartilage, Osteoporosis without current pathological fracture, unspecified osteoporosis type, Interstitial lung disease (H)       azelastine 0.1 % nasal spray    ASTELIN     Spray 1 spray into both nostrils as needed        benzonatate 100 MG capsule    TESSALON    180 capsule    Take 1 capsule (100 mg) by mouth 3 times daily as needed for cough    ILD (interstitial lung disease) (H)       cetirizine 10 MG tablet    zyrTEC     Take 10 mg by mouth daily        D3-1000 PO      Take by mouth daily        DAILY MULTIVITAMIN PO      Take 2 tablets by mouth every morning        hydroxychloroquine 200 MG tablet    PLAQUENIL    180 tablet    Take 2 tablets (400 mg) by mouth daily    ILD (interstitial lung disease) (H), Long-term use of immunosuppressant medication, Rheumatoid arthritis involving multiple sites with positive rheumatoid factor (H), Disorder of bone and cartilage, Osteoporosis without current pathological fracture, unspecified osteoporosis type, Interstitial lung disease (H)       LIPITOR 10 MG tablet   Generic drug:  atorvastatin      Take 5 mg by mouth every other day    Interstitial lung disease (H), Long-term use of immunosuppressant medication, Rheumatoid arthritis involving multiple sites with positive rheumatoid factor (H), ILD (interstitial lung disease) (H),  Disorder of bone and cartilage, Osteoporosis without current pathological fracture, unspecified osteoporosis type       losartan 25 MG tablet    COZAAR     Take 25 mg by mouth        MUCINEX DM PO      Take by mouth as needed        * predniSONE 5 MG tablet    DELTASONE    45 tablet    Take 3 tablets (15 mg) by mouth daily    ILD (interstitial lung disease) (H)       * predniSONE 10 MG tablet    DELTASONE    90 tablet    Take 1 tablet (10 mg) by mouth daily Patient takes a total dose of 13mg daily    ILD (interstitial lung disease) (H)       SM CALCIUM CITRATE+/VIT D3 PO      Take by mouth 2 times daily 630/500iu        sulfamethoxazole-trimethoprim 800-160 MG tablet    BACTRIM DS/SEPTRA DS    36 tablet    Take 1 tablet by mouth Every Mon, Wed, Fri Morning 400-80    ILD (interstitial lung disease) (H)       TYLENOL PO      Take by mouth every 6 hours as needed for mild pain or fever    Interstitial lung disease (H)       * Notice:  This list has 2 medication(s) that are the same as other medications prescribed for you. Read the directions carefully, and ask your doctor or other care provider to review them with you.

## 2018-12-07 NOTE — PROGRESS NOTES
[facility] Interstitial Lung Disease Clinic    Reason for Visit  J Luis Wolf is a 78 year old year old male who is being seen for RECHECK (Interstitial Lung RA/ILD)    HPI    Mr. Wolf is a 78-year-old with rheumatoid arthritis associated interstitial lung disease who is here for follow-up.  He was diagnosed with rheumatoid arthritis in 2012, and he has had ILD on his chest CT scans since 2013.  He was treated briefly with mycophenolate starting December 2014, but it was stopped in 2015 due to anorexia and 20 pound weight loss.  Imuran was tried, but he did not tolerate due to chills and fevers.  He has been on prednisone since that time for control of the arthritis as well as the RA ILD.  Most recently, Dr. 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  It made him feel weak and sleepy, and it was stopped 3 days ago.  He does have pain in his upper back from an old compression fracture.  He finished pulmonary rehab and is now doing the Wellness program.  He is only going once per week but plans to go 2 times per week.  He does the treadmill, bike, and elliptical and feels no shortness of breath when he exercises.  He uses oxygen 4 L with activity, 3 L otherwise.  He also does arm weights and back back exercises when he does not go to the Wellness program.  He and his wife plan to go to [place] for the winter from January to April.    He continues to endorse of shortness of breath when he walks uphill and sometimes when he walks up one flight of stairs.  Overall, he has noticed no change in his pulmonary symptoms.  He is allergic to the flu vaccine.  He had an echocardiogram in August of this year that was normal.  Chest CT scan in August of this year showed some minimal increase in the pulmonary fibrosis.        Current Outpatient Prescriptions   Medication     Acetaminophen (TYLENOL PO)     alendronate (FOSAMAX) 70 MG tablet      atorvastatin (LIPITOR) 10 MG tablet     azelastine (ASTELIN) 137 MCG/SPRAY nasal spray     benzonatate (TESSALON) 100 MG capsule     Calcium Citrate-Vitamin D (SM CALCIUM CITRATE+/VIT D3 PO)     cetirizine (ZYRTEC) 10 MG tablet     Cholecalciferol (D3-1000 PO)     Dextromethorphan-Guaifenesin (MUCINEX DM PO)     hydroxychloroquine (PLAQUENIL) 200 MG tablet     losartan (COZAAR) 25 MG tablet     Multiple Vitamin (DAILY MULTIVITAMIN PO)     predniSONE (DELTASONE) 10 MG tablet     predniSONE (DELTASONE) 5 MG tablet     sulfamethoxazole-trimethoprim (BACTRIM DS/SEPTRA DS) 800-160 MG per tablet     No current facility-administered medications for this visit.      Allergies   Allergen Reactions     Flu Virus Vaccine      Swollen face     Pistachios [Nuts] Swelling and Cough     Not tested medically     Past Medical History:   Diagnosis Date     ILD (interstitial lung disease) (H)     associated with rheumatoid arthritis, present on CT since 2013, methotrexate stopped 7/2013, started MMF 12/2014 but stopped 2015 due to 20 lb weight loss and anorexia.  Did not tolerate imuran due to chills/fevers.     Rheumatoid arthritis (H)     RA dx 2012, + CCP and RF.       Past Surgical History:   Procedure Laterality Date     APPENDECTOMY  1956     BACK SURGERY  2014    Compression fx thoracic vertebra     COLONOSCOPY  2014     ORTHOPEDIC SURGERY  2015    Bunionectomy lt foot     PERCUTANEOUS BIOPSY LIVER N/A 5/10/2018    Procedure: PERCUTANEOUS BIOPSY LIVER;  Percutaneous Liver Biopsy;  Surgeon: Raymon Gann PA-C;  [facility]     SOFT TISSUE SURGERY  2015    Bursitis rt hip cortisone injection 11 15 2015     THORACIC SURGERY  2012    Pulmonary fibrosis       Social History     Social History     Marital status:      Spouse name: N/A     Number of children: N/A     Years of education: N/A     Occupational History     Not on file.     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       Family History   Problem Relation Age of Onset     Coronary Artery Disease Father      Age 77  age 80     Hyperlipidemia Father      Hypertension Mother       age 105     Rheumatoid Arthritis No family hx of              ROS Pulmonary  A complete ROS was otherwise negative except as noted in the HPI.    Vitals: /83  Pulse 84  Resp 16  Ht 1.702 m (5' 7\")  Wt 68 kg (150 lb)  SpO2 97%  BMI 23.49 kg/m2    Exam:   GENERAL APPEARANCE: Well developed, well nourished, alert, and in no apparent distress.  RESP: good air flow throughout.  Bibasilar inspiratory crackles. No rhonchi. No wheezes.  CV: Normal S1, S2, regular rhythm, normal rate. No murmur.  No LE edema.   MS: extremities normal. No clubbing. No cyanosis.  SKIN: no rash on limited exam.  NEURO: Mentation intact, speech normal, normal gait and stance.  PSYCH: mentation appears normal. and affect normal/bright.    Results:  Recent Results (from the past 168 hour(s))   General PFT Lab (Please always keep checked)    Collection Time: 18 10:00 AM   Result Value Ref Range    FVC-Pred 3.41 L    FVC-Pre 1.01 L    FVC-%Pred-Pre 29 %    FEV1-Pre 0.73 L    FEV1-%Pred-Pre 28 %    FEV1FVC-Pred 72 %    FEV1FVC-Pre 73 %    FEFMax-Pred 6.50 L/sec    FEFMax-Pre 3.50 L/sec    FEFMax-%Pred-Pre 53 %    FEF2575-Pred 1.88 L/sec    FEF2575-Pre 0.48 L/sec    RZP6696-%Pred-Pre 25 %    ExpTime-Pre 6.95 sec    FIFMax-Pre 2.84 L/sec    VC-Pred 3.86 L    VC-Pre 1.16 L    VC-%Pred-Pre 30 %    IC-Pred 3.00 L    IC-Pre 0.87 L    IC-%Pred-Pre 29 % "    ERV-Pred 0.86 L    ERV-Pre 0.29 L    ERV-%Pred-Pre 34 %    FEV1FEV6-Pred 76 %    FEV1FEV6-Pre 73 %    FRCPleth-Pred 3.54 L    FRCPleth-Pre 1.73 L    FRCPleth-%Pred-Pre 48 %    RVPleth-Pred 2.70 L    RVPleth-Pre 1.43 L    RVPleth-%Pred-Pre 53 %    TLCPleth-Pred 6.31 L    TLCPleth-Pre 2.60 L    TLCPleth-%Pred-Pre 41 %    DLCOunc-Pred 22.45 ml/min/mmHg    DLCOunc-Pre 9.88 ml/min/mmHg    DLCOunc-%Pred-Pre 44 %    DLCOcor-Pre 10.21 ml/min/mmHg    DLCOcor-%Pred-Pre 45 %    VA-Pre 2.30 L    VA-%Pred-Pre 38 %    FEV1SVC-Pred 66 %    FEV1SVC-Pre 63 %       I reviewed primary function test that was performed today.  This shows severe restriction with a moderate diffusion defect.  Diffusion and TLC are stable.  Spirometry has declined compared to last year.  My review of the CT scan from August shows some air trapping on expiratory images.    I reviewed results with the patient.      Assessment and plan:   Mr. Wolf is a 78-year-old with rheumatoid arthritis associated ILD who is here for follow-up.  1.  RA ILD.  His dyspnea on exertion is stable.  However, there has been a decline in spirometry this year despite stability of diffusion and total lung capacity.  Decline in spirometry could be related to poor posture and compression fractures..  There has been no significant worsening of his ILD at least by chest CT.  Echocardiogram showed normal function and no indirect evidence of pulmonary hypertension in August.  CT scan by my review showed some air trapping.  If his symptoms worsen, then I would treat his small airways disease with either an ICS/LABA combination or daily azithromycin.  Mycophenolate was stopped in 2015 because of anorexia and 20 pound weight loss.  He will continue prednisone 15 mg daily for the ILD.  I encouraged him to start regular exercise with daily walks when he goes to [place] for the winter.  He will return to clinic in April when he comes back from [place].  We will recheck full PFT at that  time.  2.  Exertional hypoxia.  He will continue oxygen 4 L/min with activity and 3L/min otherwise.  3.  Healthcare maintenance.  He is allergic to the flu vaccine because he had a significant reaction to the swine flu vaccine in the 1970s.

## 2019-01-01 ENCOUNTER — HOSPITAL ENCOUNTER (INPATIENT)
Facility: CLINIC | Age: 79
LOS: 2 days | Discharge: HOME | DRG: 196 | End: 2019-07-24
Attending: EMERGENCY MEDICINE | Admitting: INTERNAL MEDICINE
Payer: COMMERCIAL

## 2019-01-01 ENCOUNTER — APPOINTMENT (OUTPATIENT)
Dept: PHYSICAL THERAPY | Facility: CLINIC | Age: 79
DRG: 196 | End: 2019-01-01
Attending: PHYSICIAN ASSISTANT
Payer: COMMERCIAL

## 2019-01-01 ENCOUNTER — APPOINTMENT (OUTPATIENT)
Dept: CT IMAGING | Facility: CLINIC | Age: 79
DRG: 085 | End: 2019-01-01
Attending: INTERNAL MEDICINE
Payer: COMMERCIAL

## 2019-01-01 ENCOUNTER — APPOINTMENT (OUTPATIENT)
Dept: CT IMAGING | Facility: CLINIC | Age: 79
End: 2019-01-01
Attending: EMERGENCY MEDICINE
Payer: COMMERCIAL

## 2019-01-01 ENCOUNTER — HOSPITAL ENCOUNTER (INPATIENT)
Facility: CLINIC | Age: 79
LOS: 3 days | DRG: 085 | End: 2019-10-24
Attending: STUDENT IN AN ORGANIZED HEALTH CARE EDUCATION/TRAINING PROGRAM | Admitting: INTERNAL MEDICINE
Payer: COMMERCIAL

## 2019-01-01 ENCOUNTER — OFFICE VISIT (OUTPATIENT)
Dept: PULMONOLOGY | Facility: CLINIC | Age: 79
End: 2019-01-01
Attending: INTERNAL MEDICINE
Payer: COMMERCIAL

## 2019-01-01 ENCOUNTER — DOCUMENTATION ONLY (OUTPATIENT)
Dept: CARE COORDINATION | Facility: CLINIC | Age: 79
End: 2019-01-01

## 2019-01-01 ENCOUNTER — APPOINTMENT (OUTPATIENT)
Dept: CT IMAGING | Facility: CLINIC | Age: 79
DRG: 085 | End: 2019-01-01
Attending: NURSE PRACTITIONER
Payer: COMMERCIAL

## 2019-01-01 ENCOUNTER — ANESTHESIA (OUTPATIENT)
Dept: MEDSURG UNIT | Facility: CLINIC | Age: 79
End: 2019-01-01

## 2019-01-01 ENCOUNTER — MYC MEDICAL ADVICE (OUTPATIENT)
Dept: RHEUMATOLOGY | Facility: CLINIC | Age: 79
End: 2019-01-01

## 2019-01-01 ENCOUNTER — APPOINTMENT (OUTPATIENT)
Dept: CARDIOLOGY | Facility: CLINIC | Age: 79
DRG: 085 | End: 2019-01-01
Attending: INTERNAL MEDICINE
Payer: COMMERCIAL

## 2019-01-01 ENCOUNTER — ANCILLARY PROCEDURE (OUTPATIENT)
Dept: CT IMAGING | Facility: CLINIC | Age: 79
End: 2019-01-01
Attending: INTERNAL MEDICINE
Payer: COMMERCIAL

## 2019-01-01 ENCOUNTER — OFFICE VISIT (OUTPATIENT)
Dept: RHEUMATOLOGY | Facility: CLINIC | Age: 79
End: 2019-01-01
Attending: INTERNAL MEDICINE
Payer: COMMERCIAL

## 2019-01-01 ENCOUNTER — APPOINTMENT (OUTPATIENT)
Dept: SPEECH THERAPY | Facility: CLINIC | Age: 79
DRG: 085 | End: 2019-01-01
Attending: INTERNAL MEDICINE
Payer: COMMERCIAL

## 2019-01-01 ENCOUNTER — APPOINTMENT (OUTPATIENT)
Dept: CT IMAGING | Facility: CLINIC | Age: 79
DRG: 196 | End: 2019-01-01
Attending: EMERGENCY MEDICINE
Payer: COMMERCIAL

## 2019-01-01 ENCOUNTER — ANESTHESIA EVENT (OUTPATIENT)
Dept: MEDSURG UNIT | Facility: CLINIC | Age: 79
End: 2019-01-01

## 2019-01-01 ENCOUNTER — HOSPITAL ENCOUNTER (EMERGENCY)
Facility: CLINIC | Age: 79
Discharge: ANOTHER HOSPITAL | End: 2019-10-21
Attending: EMERGENCY MEDICINE | Admitting: EMERGENCY MEDICINE
Payer: COMMERCIAL

## 2019-01-01 ENCOUNTER — APPOINTMENT (OUTPATIENT)
Dept: GENERAL RADIOLOGY | Facility: CLINIC | Age: 79
DRG: 085 | End: 2019-01-01
Attending: NURSE PRACTITIONER
Payer: COMMERCIAL

## 2019-01-01 ENCOUNTER — ANCILLARY PROCEDURE (OUTPATIENT)
Dept: CARDIOLOGY | Facility: CLINIC | Age: 79
End: 2019-01-01
Attending: INTERNAL MEDICINE
Payer: COMMERCIAL

## 2019-01-01 ENCOUNTER — APPOINTMENT (OUTPATIENT)
Dept: GENERAL RADIOLOGY | Facility: CLINIC | Age: 79
End: 2019-01-01
Attending: EMERGENCY MEDICINE
Payer: COMMERCIAL

## 2019-01-01 ENCOUNTER — HOSPITAL ENCOUNTER (OUTPATIENT)
Dept: CARDIAC REHAB | Facility: CLINIC | Age: 79
End: 2019-10-14
Attending: INTERNAL MEDICINE
Payer: COMMERCIAL

## 2019-01-01 VITALS
BODY MASS INDEX: 19.11 KG/M2 | SYSTOLIC BLOOD PRESSURE: 165 MMHG | WEIGHT: 122 LBS | RESPIRATION RATE: 14 BRPM | TEMPERATURE: 95.7 F | HEART RATE: 51 BPM | OXYGEN SATURATION: 94 % | DIASTOLIC BLOOD PRESSURE: 52 MMHG

## 2019-01-01 VITALS
HEART RATE: 90 BPM | OXYGEN SATURATION: 96 % | TEMPERATURE: 97.2 F | WEIGHT: 133.2 LBS | DIASTOLIC BLOOD PRESSURE: 64 MMHG | BODY MASS INDEX: 20.86 KG/M2 | RESPIRATION RATE: 20 BRPM | SYSTOLIC BLOOD PRESSURE: 124 MMHG

## 2019-01-01 VITALS
BODY MASS INDEX: 20.72 KG/M2 | SYSTOLIC BLOOD PRESSURE: 117 MMHG | HEIGHT: 67 IN | WEIGHT: 132 LBS | DIASTOLIC BLOOD PRESSURE: 76 MMHG | RESPIRATION RATE: 18 BRPM | OXYGEN SATURATION: 96 % | HEART RATE: 96 BPM

## 2019-01-01 VITALS
HEART RATE: 95 BPM | BODY MASS INDEX: 22.21 KG/M2 | WEIGHT: 141.5 LBS | OXYGEN SATURATION: 97 % | HEIGHT: 67 IN | SYSTOLIC BLOOD PRESSURE: 120 MMHG | DIASTOLIC BLOOD PRESSURE: 75 MMHG

## 2019-01-01 VITALS
HEART RATE: 92 BPM | SYSTOLIC BLOOD PRESSURE: 125 MMHG | DIASTOLIC BLOOD PRESSURE: 85 MMHG | RESPIRATION RATE: 16 BRPM | TEMPERATURE: 97.6 F | HEIGHT: 67 IN | OXYGEN SATURATION: 96 % | BODY MASS INDEX: 22 KG/M2 | WEIGHT: 140.2 LBS

## 2019-01-01 VITALS
SYSTOLIC BLOOD PRESSURE: 132 MMHG | HEART RATE: 101 BPM | DIASTOLIC BLOOD PRESSURE: 72 MMHG | OXYGEN SATURATION: 91 % | RESPIRATION RATE: 30 BRPM | TEMPERATURE: 98.1 F

## 2019-01-01 DIAGNOSIS — R79.89 TROPONIN LEVEL ELEVATED: ICD-10-CM

## 2019-01-01 DIAGNOSIS — Z79.60 LONG-TERM USE OF IMMUNOSUPPRESSANT MEDICATION: ICD-10-CM

## 2019-01-01 DIAGNOSIS — J84.9 ILD (INTERSTITIAL LUNG DISEASE) (H): ICD-10-CM

## 2019-01-01 DIAGNOSIS — Z23 ENCOUNTER FOR ADMINISTRATION OF VACCINE: ICD-10-CM

## 2019-01-01 DIAGNOSIS — J84.9 ILD (INTERSTITIAL LUNG DISEASE) (H): Primary | ICD-10-CM

## 2019-01-01 DIAGNOSIS — R06.02 SHORTNESS OF BREATH: Primary | ICD-10-CM

## 2019-01-01 DIAGNOSIS — E78.5 DYSLIPIDEMIA: Primary | ICD-10-CM

## 2019-01-01 DIAGNOSIS — J84.9 INTERSTITIAL LUNG DISEASE (H): ICD-10-CM

## 2019-01-01 DIAGNOSIS — M89.9 DISORDER OF BONE AND CARTILAGE: ICD-10-CM

## 2019-01-01 DIAGNOSIS — M05.79 RHEUMATOID ARTHRITIS INVOLVING MULTIPLE SITES WITH POSITIVE RHEUMATOID FACTOR (H): ICD-10-CM

## 2019-01-01 DIAGNOSIS — S12.001A CLOSED NONDISPLACED FRACTURE OF FIRST CERVICAL VERTEBRA, UNSPECIFIED FRACTURE MORPHOLOGY, INITIAL ENCOUNTER (H): ICD-10-CM

## 2019-01-01 DIAGNOSIS — J96.11 CHRONIC RESPIRATORY FAILURE WITH HYPOXIA AND HYPERCAPNIA (H): ICD-10-CM

## 2019-01-01 DIAGNOSIS — J96.12 CHRONIC RESPIRATORY FAILURE WITH HYPOXIA AND HYPERCAPNIA (H): ICD-10-CM

## 2019-01-01 DIAGNOSIS — R06.02 SHORTNESS OF BREATH: ICD-10-CM

## 2019-01-01 DIAGNOSIS — M81.0 OSTEOPOROSIS WITHOUT CURRENT PATHOLOGICAL FRACTURE, UNSPECIFIED OSTEOPOROSIS TYPE: ICD-10-CM

## 2019-01-01 DIAGNOSIS — R79.89 ELEVATED TROPONIN: ICD-10-CM

## 2019-01-01 DIAGNOSIS — M94.9 DISORDER OF BONE AND CARTILAGE: ICD-10-CM

## 2019-01-01 DIAGNOSIS — J84.9 INTERSTITIAL LUNG DISEASE (H): Primary | ICD-10-CM

## 2019-01-01 DIAGNOSIS — M05.79 RHEUMATOID ARTHRITIS INVOLVING MULTIPLE SITES WITH POSITIVE RHEUMATOID FACTOR (H): Primary | ICD-10-CM

## 2019-01-01 DIAGNOSIS — S12.100A CLOSED DISPLACED FRACTURE OF SECOND CERVICAL VERTEBRA, UNSPECIFIED FRACTURE MORPHOLOGY, INITIAL ENCOUNTER (H): ICD-10-CM

## 2019-01-01 LAB
6 MIN WALK (FT): 550 FT
6 MIN WALK (M): 168 M
ALBUMIN SERPL-MCNC: 3.3 G/DL (ref 3.4–5)
ALBUMIN SERPL-MCNC: 3.7 G/DL (ref 3.4–5)
ALBUMIN UR-MCNC: 100 MG/DL
ALBUMIN UR-MCNC: 30 MG/DL
ALP SERPL-CCNC: 51 U/L (ref 40–150)
ALT SERPL W P-5'-P-CCNC: 33 U/L (ref 0–70)
ALT SERPL W P-5'-P-CCNC: 36 U/L (ref 0–70)
ANION GAP SERPL CALCULATED.3IONS-SCNC: 1 MMOL/L (ref 3–14)
ANION GAP SERPL CALCULATED.3IONS-SCNC: 2 MMOL/L (ref 3–14)
ANION GAP SERPL CALCULATED.3IONS-SCNC: 4 MMOL/L (ref 3–14)
ANION GAP SERPL CALCULATED.3IONS-SCNC: ABNORMAL MMOL/L (ref 3–14)
APPEARANCE UR: ABNORMAL
APPEARANCE UR: CLEAR
AST SERPL W P-5'-P-CCNC: 29 U/L (ref 0–45)
AST SERPL W P-5'-P-CCNC: 33 U/L (ref 0–45)
BASE EXCESS BLDA CALC-SCNC: 10 MMOL/L
BASE EXCESS BLDV CALC-SCNC: 19.8 MMOL/L
BASOPHILS # BLD AUTO: 0 10E9/L (ref 0–0.2)
BASOPHILS # BLD AUTO: 0.1 10E9/L (ref 0–0.2)
BASOPHILS NFR BLD AUTO: 0.1 %
BASOPHILS NFR BLD AUTO: 0.6 %
BILIRUB SERPL-MCNC: 0.4 MG/DL (ref 0.2–1.3)
BILIRUB UR QL STRIP: ABNORMAL
BILIRUB UR QL STRIP: NEGATIVE
BUN SERPL-MCNC: 15 MG/DL (ref 7–30)
BUN SERPL-MCNC: 17 MG/DL (ref 7–30)
BUN SERPL-MCNC: 20 MG/DL (ref 7–30)
BUN SERPL-MCNC: 21 MG/DL (ref 7–30)
CALCIUM SERPL-MCNC: 10.1 MG/DL (ref 8.5–10.1)
CALCIUM SERPL-MCNC: 8.7 MG/DL (ref 8.5–10.1)
CALCIUM SERPL-MCNC: 9.4 MG/DL (ref 8.5–10.1)
CALCIUM SERPL-MCNC: 9.6 MG/DL (ref 8.5–10.1)
CHLORIDE SERPL-SCNC: 93 MMOL/L (ref 94–109)
CHLORIDE SERPL-SCNC: 94 MMOL/L (ref 94–109)
CHLORIDE SERPL-SCNC: 95 MMOL/L (ref 94–109)
CHLORIDE SERPL-SCNC: 95 MMOL/L (ref 94–109)
CK SERPL-CCNC: 437 U/L (ref 30–300)
CK SERPL-CCNC: 72 U/L (ref 30–300)
CO2 SERPL-SCNC: 42 MMOL/L (ref 20–32)
CO2 SERPL-SCNC: 43 MMOL/L (ref 20–32)
CO2 SERPL-SCNC: 45 MMOL/L (ref 20–32)
CO2 SERPL-SCNC: >45 MMOL/L (ref 20–32)
COLOR UR AUTO: ABNORMAL
COLOR UR AUTO: YELLOW
CREAT SERPL-MCNC: 0.57 MG/DL (ref 0.66–1.25)
CREAT SERPL-MCNC: 0.6 MG/DL (ref 0.66–1.25)
CREAT SERPL-MCNC: 0.62 MG/DL (ref 0.66–1.25)
CREAT SERPL-MCNC: 0.7 MG/DL (ref 0.66–1.25)
CREAT SERPL-MCNC: 0.91 MG/DL (ref 0.66–1.25)
D DIMER PPP FEU-MCNC: 2.5 UG/ML FEU (ref 0–0.5)
DIFFERENTIAL METHOD BLD: ABNORMAL
DIFFERENTIAL METHOD BLD: ABNORMAL
DLCOUNC-%PRED-PRE: 38 %
DLCOUNC-%PRED-PRE: 42 %
DLCOUNC-PRE: 8.41 ML/MIN/MMHG
DLCOUNC-PRE: 9.25 ML/MIN/MMHG
DLCOUNC-PRED: 21.62 ML/MIN/MMHG
DLCOUNC-PRED: 21.62 ML/MIN/MMHG
EOSINOPHIL # BLD AUTO: 0 10E9/L (ref 0–0.7)
EOSINOPHIL # BLD AUTO: 0.2 10E9/L (ref 0–0.7)
EOSINOPHIL NFR BLD AUTO: 0.3 %
EOSINOPHIL NFR BLD AUTO: 1.9 %
ERV-%PRED-PRE: 32 %
ERV-%PRED-PRE: 33 %
ERV-PRE: 0.31 L
ERV-PRE: 0.36 L
ERV-PRED: 0.95 L
ERV-PRED: 1.07 L
ERYTHROCYTE [DISTWIDTH] IN BLOOD BY AUTOMATED COUNT: 13.2 % (ref 10–15)
ERYTHROCYTE [DISTWIDTH] IN BLOOD BY AUTOMATED COUNT: 13.4 % (ref 10–15)
ERYTHROCYTE [DISTWIDTH] IN BLOOD BY AUTOMATED COUNT: 13.5 % (ref 10–15)
ERYTHROCYTE [DISTWIDTH] IN BLOOD BY AUTOMATED COUNT: 13.5 % (ref 10–15)
EXPTIME-PRE: 5.39 SEC
EXPTIME-PRE: 5.8 SEC
FEF2575-%PRED-PRE: 24 %
FEF2575-%PRED-PRE: 40 %
FEF2575-PRE: 0.46 L/SEC
FEF2575-PRE: 0.76 L/SEC
FEF2575-PRED: 1.87 L/SEC
FEF2575-PRED: 1.87 L/SEC
FEFMAX-%PRED-PRE: 55 %
FEFMAX-%PRED-PRE: 57 %
FEFMAX-PRE: 3.59 L/SEC
FEFMAX-PRE: 3.73 L/SEC
FEFMAX-PRED: 6.48 L/SEC
FEFMAX-PRED: 6.48 L/SEC
FEV1-%PRED-PRE: 29 %
FEV1-%PRED-PRE: 34 %
FEV1-PRE: 0.75 L
FEV1-PRE: 0.89 L
FEV1FEV6-PRE: 72 %
FEV1FEV6-PRE: 79 %
FEV1FEV6-PRED: 76 %
FEV1FEV6-PRED: 76 %
FEV1FVC-PRE: 72 %
FEV1FVC-PRE: 79 %
FEV1FVC-PRED: 75 %
FEV1FVC-PRED: 75 %
FEV1SVC-PRE: 69 %
FEV1SVC-PRE: 77 %
FEV1SVC-PRED: 66 %
FEV1SVC-PRED: 66 %
FIFMAX-PRE: 2.4 L/SEC
FIFMAX-PRE: 2.53 L/SEC
FIO2-PRE: 6 %
FRCPLETH-%PRED-PRE: 43 %
FRCPLETH-%PRED-PRE: 51 %
FRCPLETH-PRE: 1.55 L
FRCPLETH-PRE: 1.82 L
FRCPLETH-PRED: 3.54 L
FRCPLETH-PRED: 3.54 L
FVC-%PRED-PRE: 30 %
FVC-%PRED-PRE: 33 %
FVC-PRE: 1.04 L
FVC-PRE: 1.13 L
FVC-PRED: 3.41 L
FVC-PRED: 3.41 L
GFR SERPL CREATININE-BSD FRML MDRD: 80 ML/MIN/{1.73_M2}
GFR SERPL CREATININE-BSD FRML MDRD: 89 ML/MIN/{1.73_M2}
GFR SERPL CREATININE-BSD FRML MDRD: >90 ML/MIN/{1.73_M2}
GLUCOSE BLDC GLUCOMTR-MCNC: 119 MG/DL (ref 70–99)
GLUCOSE BLDC GLUCOMTR-MCNC: 150 MG/DL (ref 70–99)
GLUCOSE BLDC GLUCOMTR-MCNC: 66 MG/DL (ref 70–99)
GLUCOSE SERPL-MCNC: 122 MG/DL (ref 70–99)
GLUCOSE SERPL-MCNC: 130 MG/DL (ref 70–99)
GLUCOSE SERPL-MCNC: 68 MG/DL (ref 70–99)
GLUCOSE SERPL-MCNC: 88 MG/DL (ref 70–99)
GLUCOSE SERPL-MCNC: 89 MG/DL (ref 70–99)
GLUCOSE UR STRIP-MCNC: NEGATIVE MG/DL
GLUCOSE UR STRIP-MCNC: NEGATIVE MG/DL
HCO3 BLD-SCNC: 37 MMOL/L (ref 21–28)
HCO3 BLDV-SCNC: 50 MMOL/L (ref 21–28)
HCT VFR BLD AUTO: 38.4 % (ref 40–53)
HCT VFR BLD AUTO: 38.5 % (ref 40–53)
HCT VFR BLD AUTO: 43.7 % (ref 40–53)
HCT VFR BLD AUTO: 46.8 % (ref 40–53)
HGB BLD-MCNC: 11.4 G/DL (ref 13.3–17.7)
HGB BLD-MCNC: 11.8 G/DL (ref 13.3–17.7)
HGB BLD-MCNC: 12.9 G/DL (ref 13.3–17.7)
HGB BLD-MCNC: 14.1 G/DL (ref 13.3–17.7)
HGB UR QL STRIP: NEGATIVE
HGB UR QL STRIP: NEGATIVE
HYALINE CASTS #/AREA URNS LPF: 1 /LPF (ref 0–2)
HYALINE CASTS #/AREA URNS LPF: 14 /LPF (ref 0–2)
IC-%PRED-PRE: 26 %
IC-%PRED-PRE: 28 %
IC-PRE: 0.77 L
IC-PRE: 0.79 L
IC-PRED: 2.78 L
IC-PRED: 2.9 L
IMM GRANULOCYTES # BLD: 0 10E9/L (ref 0–0.4)
IMM GRANULOCYTES # BLD: 0 10E9/L (ref 0–0.4)
IMM GRANULOCYTES NFR BLD: 0.5 %
IMM GRANULOCYTES NFR BLD: 0.5 %
INR PPP: 0.89 (ref 0.86–1.14)
INTERPRETATION ECG - MUSE: NORMAL
KETONES UR STRIP-MCNC: 40 MG/DL
KETONES UR STRIP-MCNC: 5 MG/DL
LACTATE BLD-SCNC: 0.8 MMOL/L (ref 0.7–2)
LACTATE BLD-SCNC: 1.3 MMOL/L (ref 0.7–2)
LACTATE BLD-SCNC: 2.2 MMOL/L (ref 0.7–2)
LEUKOCYTE ESTERASE UR QL STRIP: NEGATIVE
LEUKOCYTE ESTERASE UR QL STRIP: NEGATIVE
LYMPHOCYTES # BLD AUTO: 0.4 10E9/L (ref 0.8–5.3)
LYMPHOCYTES # BLD AUTO: 1.4 10E9/L (ref 0.8–5.3)
LYMPHOCYTES NFR BLD AUTO: 16.7 %
LYMPHOCYTES NFR BLD AUTO: 5.4 %
MAGNESIUM SERPL-MCNC: 1.8 MG/DL (ref 1.6–2.3)
MAGNESIUM SERPL-MCNC: 2 MG/DL (ref 1.6–2.3)
MCH RBC QN AUTO: 30.6 PG (ref 26.5–33)
MCH RBC QN AUTO: 30.7 PG (ref 26.5–33)
MCH RBC QN AUTO: 30.8 PG (ref 26.5–33)
MCH RBC QN AUTO: 31.2 PG (ref 26.5–33)
MCHC RBC AUTO-ENTMCNC: 29.5 G/DL (ref 31.5–36.5)
MCHC RBC AUTO-ENTMCNC: 29.7 G/DL (ref 31.5–36.5)
MCHC RBC AUTO-ENTMCNC: 30.1 G/DL (ref 31.5–36.5)
MCHC RBC AUTO-ENTMCNC: 30.6 G/DL (ref 31.5–36.5)
MCV RBC AUTO: 102 FL (ref 78–100)
MCV RBC AUTO: 102 FL (ref 78–100)
MCV RBC AUTO: 104 FL (ref 78–100)
MCV RBC AUTO: 104 FL (ref 78–100)
MONOCYTES # BLD AUTO: 0.4 10E9/L (ref 0–1.3)
MONOCYTES # BLD AUTO: 0.8 10E9/L (ref 0–1.3)
MONOCYTES NFR BLD AUTO: 4.5 %
MONOCYTES NFR BLD AUTO: 9.8 %
MUCOUS THREADS #/AREA URNS LPF: PRESENT /LPF
MUCOUS THREADS #/AREA URNS LPF: PRESENT /LPF
NEUTROPHILS # BLD AUTO: 5.9 10E9/L (ref 1.6–8.3)
NEUTROPHILS # BLD AUTO: 6.9 10E9/L (ref 1.6–8.3)
NEUTROPHILS NFR BLD AUTO: 70.5 %
NEUTROPHILS NFR BLD AUTO: 89.2 %
NITRATE UR QL: NEGATIVE
NITRATE UR QL: NEGATIVE
NRBC # BLD AUTO: 0 10*3/UL
NRBC # BLD AUTO: 0 10*3/UL
NRBC BLD AUTO-RTO: 0 /100
NRBC BLD AUTO-RTO: 0 /100
NT-PROBNP SERPL-MCNC: 328 PG/ML (ref 0–1800)
NT-PROBNP SERPL-MCNC: 608 PG/ML (ref 0–1800)
O2/TOTAL GAS SETTING VFR VENT: 3 %
OXYHGB MFR BLD: 96 % (ref 92–100)
OXYHGB MFR BLDV: 38 %
PCO2 BLD: 62 MM HG (ref 35–45)
PCO2 BLDV: 86 MM HG (ref 40–50)
PH BLD: 7.39 PH (ref 7.35–7.45)
PH BLDV: 7.38 PH (ref 7.32–7.43)
PH UR STRIP: 5 PH (ref 5–7)
PH UR STRIP: 8.5 PH (ref 5–7)
PHOSPHATE SERPL-MCNC: 3.6 MG/DL (ref 2.5–4.5)
PLATELET # BLD AUTO: 155 10E9/L (ref 150–450)
PLATELET # BLD AUTO: 159 10E9/L (ref 150–450)
PLATELET # BLD AUTO: 167 10E9/L (ref 150–450)
PLATELET # BLD AUTO: 175 10E9/L (ref 150–450)
PO2 BLD: 90 MM HG (ref 80–105)
PO2 BLDV: 23 MM HG (ref 25–47)
POTASSIUM SERPL-SCNC: 3.9 MMOL/L (ref 3.4–5.3)
POTASSIUM SERPL-SCNC: 4 MMOL/L (ref 3.4–5.3)
POTASSIUM SERPL-SCNC: 4.2 MMOL/L (ref 3.4–5.3)
POTASSIUM SERPL-SCNC: 4.2 MMOL/L (ref 3.4–5.3)
PROCALCITONIN SERPL-MCNC: <0.05 NG/ML
PROT SERPL-MCNC: 6.4 G/DL (ref 6.8–8.8)
RADIOLOGIST FLAGS: ABNORMAL
RBC # BLD AUTO: 3.7 10E12/L (ref 4.4–5.9)
RBC # BLD AUTO: 3.78 10E12/L (ref 4.4–5.9)
RBC # BLD AUTO: 4.21 10E12/L (ref 4.4–5.9)
RBC # BLD AUTO: 4.59 10E12/L (ref 4.4–5.9)
RBC #/AREA URNS AUTO: 2 /HPF (ref 0–2)
RBC #/AREA URNS AUTO: 2 /HPF (ref 0–2)
RVPLETH-%PRED-PRE: 45 %
RVPLETH-%PRED-PRE: 54 %
RVPLETH-PRE: 1.24 L
RVPLETH-PRE: 1.46 L
RVPLETH-PRED: 2.7 L
RVPLETH-PRED: 2.7 L
SODIUM SERPL-SCNC: 137 MMOL/L (ref 133–144)
SODIUM SERPL-SCNC: 138 MMOL/L (ref 133–144)
SODIUM SERPL-SCNC: 140 MMOL/L (ref 133–144)
SODIUM SERPL-SCNC: 142 MMOL/L (ref 133–144)
SOURCE: ABNORMAL
SOURCE: ABNORMAL
SP GR UR STRIP: 1.02 (ref 1–1.03)
SP GR UR STRIP: 1.03 (ref 1–1.03)
SQUAMOUS #/AREA URNS AUTO: 1 /HPF (ref 0–1)
TLCPLETH-%PRED-PRE: 36 %
TLCPLETH-%PRED-PRE: 41 %
TLCPLETH-PRE: 2.32 L
TLCPLETH-PRE: 2.61 L
TLCPLETH-PRED: 6.31 L
TLCPLETH-PRED: 6.31 L
TROPONIN I SERPL-MCNC: 0.03 UG/L (ref 0–0.04)
TROPONIN I SERPL-MCNC: 0.05 UG/L (ref 0–0.04)
TROPONIN I SERPL-MCNC: 0.05 UG/L (ref 0–0.04)
TROPONIN I SERPL-MCNC: 0.06 UG/L (ref 0–0.04)
TROPONIN I SERPL-MCNC: 0.07 UG/L (ref 0–0.04)
TROPONIN I SERPL-MCNC: 0.09 UG/L (ref 0–0.04)
TROPONIN I SERPL-MCNC: 0.11 UG/L (ref 0–0.04)
TROPONIN I SERPL-MCNC: 0.12 UG/L (ref 0–0.04)
TROPONIN I SERPL-MCNC: 0.13 UG/L (ref 0–0.04)
TROPONIN I SERPL-MCNC: 0.14 UG/L (ref 0–0.04)
TROPONIN I SERPL-MCNC: 0.14 UG/L (ref 0–0.04)
TROPONIN I SERPL-MCNC: 0.15 UG/L (ref 0–0.04)
TROPONIN I SERPL-MCNC: 0.16 UG/L (ref 0–0.04)
TROPONIN I SERPL-MCNC: 0.18 UG/L (ref 0–0.04)
UROBILINOGEN UR STRIP-MCNC: 0 MG/DL (ref 0–2)
UROBILINOGEN UR STRIP-MCNC: NORMAL MG/DL (ref 0–2)
VA-%PRED-PRE: 44 %
VA-%PRED-PRE: 47 %
VA-PRE: 2.43 L
VA-PRE: 2.61 L
VC-%PRED-PRE: 28 %
VC-%PRED-PRE: 29 %
VC-PRE: 1.08 L
VC-PRE: 1.15 L
VC-PRED: 3.85 L
VC-PRED: 3.85 L
WBC # BLD AUTO: 7.7 10E9/L (ref 4–11)
WBC # BLD AUTO: 8.4 10E9/L (ref 4–11)
WBC # BLD AUTO: 8.6 10E9/L (ref 4–11)
WBC # BLD AUTO: 9.7 10E9/L (ref 4–11)
WBC #/AREA URNS AUTO: 1 /HPF (ref 0–5)
WBC #/AREA URNS AUTO: 1 /HPF (ref 0–5)

## 2019-01-01 PROCEDURE — G0238 OTH RESP PROC, INDIV: HCPCS

## 2019-01-01 PROCEDURE — 96361 HYDRATE IV INFUSION ADD-ON: CPT

## 2019-01-01 PROCEDURE — 93005 ELECTROCARDIOGRAM TRACING: CPT

## 2019-01-01 PROCEDURE — 94640 AIRWAY INHALATION TREATMENT: CPT

## 2019-01-01 PROCEDURE — 82805 BLOOD GASES W/O2 SATURATION: CPT | Performed by: NURSE PRACTITIONER

## 2019-01-01 PROCEDURE — 84450 TRANSFERASE (AST) (SGOT): CPT | Performed by: INTERNAL MEDICINE

## 2019-01-01 PROCEDURE — 84484 ASSAY OF TROPONIN QUANT: CPT | Performed by: PHYSICIAN ASSISTANT

## 2019-01-01 PROCEDURE — 99223 1ST HOSP IP/OBS HIGH 75: CPT | Mod: AI | Performed by: INTERNAL MEDICINE

## 2019-01-01 PROCEDURE — 99223 1ST HOSP IP/OBS HIGH 75: CPT | Mod: AI | Performed by: PHYSICIAN ASSISTANT

## 2019-01-01 PROCEDURE — 25000128 H RX IP 250 OP 636: Performed by: PHYSICIAN ASSISTANT

## 2019-01-01 PROCEDURE — 90471 IMMUNIZATION ADMIN: CPT | Mod: ZF

## 2019-01-01 PROCEDURE — 90750 HZV VACC RECOMBINANT IM: CPT | Mod: ZF | Performed by: INTERNAL MEDICINE

## 2019-01-01 PROCEDURE — 25000132 ZZH RX MED GY IP 250 OP 250 PS 637: Performed by: INTERNAL MEDICINE

## 2019-01-01 PROCEDURE — 25000132 ZZH RX MED GY IP 250 OP 250 PS 637: Performed by: EMERGENCY MEDICINE

## 2019-01-01 PROCEDURE — 99239 HOSP IP/OBS DSCHRG MGMT >30: CPT | Performed by: INTERNAL MEDICINE

## 2019-01-01 PROCEDURE — 12000047 ZZH R&B IMCU

## 2019-01-01 PROCEDURE — 71046 X-RAY EXAM CHEST 2 VIEWS: CPT

## 2019-01-01 PROCEDURE — 99285 EMERGENCY DEPT VISIT HI MDM: CPT | Mod: 25

## 2019-01-01 PROCEDURE — 99291 CRITICAL CARE FIRST HOUR: CPT | Performed by: NURSE PRACTITIONER

## 2019-01-01 PROCEDURE — 82947 ASSAY GLUCOSE BLOOD QUANT: CPT | Performed by: HOSPITALIST

## 2019-01-01 PROCEDURE — 84145 PROCALCITONIN (PCT): CPT | Performed by: HOSPITALIST

## 2019-01-01 PROCEDURE — 96374 THER/PROPH/DIAG INJ IV PUSH: CPT

## 2019-01-01 PROCEDURE — 25000131 ZZH RX MED GY IP 250 OP 636 PS 637: Performed by: INTERNAL MEDICINE

## 2019-01-01 PROCEDURE — 99207 ZZC MOONLIGHTING INDICATOR: CPT | Performed by: INTERNAL MEDICINE

## 2019-01-01 PROCEDURE — 83605 ASSAY OF LACTIC ACID: CPT | Performed by: INTERNAL MEDICINE

## 2019-01-01 PROCEDURE — 80048 BASIC METABOLIC PNL TOTAL CA: CPT | Performed by: PHYSICIAN ASSISTANT

## 2019-01-01 PROCEDURE — 84100 ASSAY OF PHOSPHORUS: CPT | Performed by: HOSPITALIST

## 2019-01-01 PROCEDURE — 83880 ASSAY OF NATRIURETIC PEPTIDE: CPT | Performed by: HOSPITALIST

## 2019-01-01 PROCEDURE — 22310 CLOSED TX VERT FX W/O MANJ: CPT

## 2019-01-01 PROCEDURE — 84484 ASSAY OF TROPONIN QUANT: CPT | Performed by: HOSPITALIST

## 2019-01-01 PROCEDURE — 12000000 ZZH R&B MED SURG/OB

## 2019-01-01 PROCEDURE — 82550 ASSAY OF CK (CPK): CPT | Performed by: HOSPITALIST

## 2019-01-01 PROCEDURE — 25000132 ZZH RX MED GY IP 250 OP 250 PS 637: Performed by: NURSE PRACTITIONER

## 2019-01-01 PROCEDURE — 25000128 H RX IP 250 OP 636: Performed by: EMERGENCY MEDICINE

## 2019-01-01 PROCEDURE — 70450 CT HEAD/BRAIN W/O DYE: CPT

## 2019-01-01 PROCEDURE — 93010 ELECTROCARDIOGRAM REPORT: CPT | Performed by: INTERNAL MEDICINE

## 2019-01-01 PROCEDURE — 36415 COLL VENOUS BLD VENIPUNCTURE: CPT | Performed by: INTERNAL MEDICINE

## 2019-01-01 PROCEDURE — 36415 COLL VENOUS BLD VENIPUNCTURE: CPT | Performed by: HOSPITALIST

## 2019-01-01 PROCEDURE — 99232 SBSQ HOSP IP/OBS MODERATE 35: CPT | Performed by: INTERNAL MEDICINE

## 2019-01-01 PROCEDURE — 82805 BLOOD GASES W/O2 SATURATION: CPT | Performed by: EMERGENCY MEDICINE

## 2019-01-01 PROCEDURE — 25000125 ZZHC RX 250: Performed by: INTERNAL MEDICINE

## 2019-01-01 PROCEDURE — 92610 EVALUATE SWALLOWING FUNCTION: CPT | Mod: GN

## 2019-01-01 PROCEDURE — 80053 COMPREHEN METABOLIC PANEL: CPT | Performed by: EMERGENCY MEDICINE

## 2019-01-01 PROCEDURE — 83880 ASSAY OF NATRIURETIC PEPTIDE: CPT | Performed by: EMERGENCY MEDICINE

## 2019-01-01 PROCEDURE — 25000128 H RX IP 250 OP 636: Performed by: INTERNAL MEDICINE

## 2019-01-01 PROCEDURE — 85379 FIBRIN DEGRADATION QUANT: CPT | Performed by: EMERGENCY MEDICINE

## 2019-01-01 PROCEDURE — 84484 ASSAY OF TROPONIN QUANT: CPT | Performed by: EMERGENCY MEDICINE

## 2019-01-01 PROCEDURE — 99238 HOSP IP/OBS DSCHRG MGMT 30/<: CPT | Performed by: NURSE PRACTITIONER

## 2019-01-01 PROCEDURE — G0237 THERAPEUTIC PROCD STRG ENDUR: HCPCS

## 2019-01-01 PROCEDURE — 85610 PROTHROMBIN TIME: CPT | Performed by: EMERGENCY MEDICINE

## 2019-01-01 PROCEDURE — 82040 ASSAY OF SERUM ALBUMIN: CPT | Performed by: INTERNAL MEDICINE

## 2019-01-01 PROCEDURE — 71045 X-RAY EXAM CHEST 1 VIEW: CPT

## 2019-01-01 PROCEDURE — 82565 ASSAY OF CREATININE: CPT | Performed by: INTERNAL MEDICINE

## 2019-01-01 PROCEDURE — 25000125 ZZHC RX 250: Performed by: NURSE PRACTITIONER

## 2019-01-01 PROCEDURE — G0463 HOSPITAL OUTPT CLINIC VISIT: HCPCS | Mod: 25,ZF

## 2019-01-01 PROCEDURE — 25000132 ZZH RX MED GY IP 250 OP 250 PS 637: Performed by: HOSPITALIST

## 2019-01-01 PROCEDURE — 80048 BASIC METABOLIC PNL TOTAL CA: CPT | Performed by: INTERNAL MEDICINE

## 2019-01-01 PROCEDURE — 25800030 ZZH RX IP 258 OP 636: Performed by: INTERNAL MEDICINE

## 2019-01-01 PROCEDURE — 83735 ASSAY OF MAGNESIUM: CPT | Performed by: HOSPITALIST

## 2019-01-01 PROCEDURE — 82550 ASSAY OF CK (CPK): CPT | Performed by: EMERGENCY MEDICINE

## 2019-01-01 PROCEDURE — 81001 URINALYSIS AUTO W/SCOPE: CPT | Performed by: INTERNAL MEDICINE

## 2019-01-01 PROCEDURE — 84484 ASSAY OF TROPONIN QUANT: CPT | Performed by: INTERNAL MEDICINE

## 2019-01-01 PROCEDURE — 25500064 ZZH RX 255 OP 636: Performed by: STUDENT IN AN ORGANIZED HEALTH CARE EDUCATION/TRAINING PROGRAM

## 2019-01-01 PROCEDURE — 40000264 ECHOCARDIOGRAM COMPLETE

## 2019-01-01 PROCEDURE — 96376 TX/PRO/DX INJ SAME DRUG ADON: CPT

## 2019-01-01 PROCEDURE — G0463 HOSPITAL OUTPT CLINIC VISIT: HCPCS | Mod: ZF

## 2019-01-01 PROCEDURE — 85027 COMPLETE CBC AUTOMATED: CPT | Performed by: INTERNAL MEDICINE

## 2019-01-01 PROCEDURE — 97161 PT EVAL LOW COMPLEX 20 MIN: CPT | Mod: GP

## 2019-01-01 PROCEDURE — 25000581 ZZH RX MED A9270 GY (STAT IND- M) 250: Mod: ZF | Performed by: INTERNAL MEDICINE

## 2019-01-01 PROCEDURE — 00000146 ZZHCL STATISTIC GLUCOSE BY METER IP

## 2019-01-01 PROCEDURE — 36415 COLL VENOUS BLD VENIPUNCTURE: CPT | Performed by: PHYSICIAN ASSISTANT

## 2019-01-01 PROCEDURE — 99223 1ST HOSP IP/OBS HIGH 75: CPT | Performed by: NURSE PRACTITIONER

## 2019-01-01 PROCEDURE — 40000275 ZZH STATISTIC RCP TIME EA 10 MIN

## 2019-01-01 PROCEDURE — 25000128 H RX IP 250 OP 636: Performed by: NURSE PRACTITIONER

## 2019-01-01 PROCEDURE — 83605 ASSAY OF LACTIC ACID: CPT | Performed by: EMERGENCY MEDICINE

## 2019-01-01 PROCEDURE — 25000125 ZZHC RX 250

## 2019-01-01 PROCEDURE — 25000132 ZZH RX MED GY IP 250 OP 250 PS 637: Performed by: PHYSICIAN ASSISTANT

## 2019-01-01 PROCEDURE — 40000244 ZZH STATISTIC VISIT PULM REHAB

## 2019-01-01 PROCEDURE — 80048 BASIC METABOLIC PNL TOTAL CA: CPT | Performed by: EMERGENCY MEDICINE

## 2019-01-01 PROCEDURE — 99223 1ST HOSP IP/OBS HIGH 75: CPT | Performed by: PHYSICIAN ASSISTANT

## 2019-01-01 PROCEDURE — 85025 COMPLETE CBC W/AUTO DIFF WBC: CPT | Performed by: EMERGENCY MEDICINE

## 2019-01-01 PROCEDURE — 72125 CT NECK SPINE W/O DYE: CPT

## 2019-01-01 PROCEDURE — 99231 SBSQ HOSP IP/OBS SF/LOW 25: CPT | Performed by: INTERNAL MEDICINE

## 2019-01-01 PROCEDURE — 71260 CT THORAX DX C+: CPT

## 2019-01-01 PROCEDURE — 96372 THER/PROPH/DIAG INJ SC/IM: CPT | Mod: 59

## 2019-01-01 PROCEDURE — 84460 ALANINE AMINO (ALT) (SGPT): CPT | Performed by: INTERNAL MEDICINE

## 2019-01-01 PROCEDURE — 81001 URINALYSIS AUTO W/SCOPE: CPT | Performed by: EMERGENCY MEDICINE

## 2019-01-01 PROCEDURE — 36600 WITHDRAWAL OF ARTERIAL BLOOD: CPT

## 2019-01-01 PROCEDURE — 93306 TTE W/DOPPLER COMPLETE: CPT | Mod: 26 | Performed by: INTERNAL MEDICINE

## 2019-01-01 PROCEDURE — 96360 HYDRATION IV INFUSION INIT: CPT

## 2019-01-01 PROCEDURE — G0463 HOSPITAL OUTPT CLINIC VISIT: HCPCS

## 2019-01-01 PROCEDURE — 83735 ASSAY OF MAGNESIUM: CPT | Performed by: EMERGENCY MEDICINE

## 2019-01-01 RX ORDER — METHYLPREDNISOLONE SODIUM SUCCINATE 40 MG/ML
40 INJECTION, POWDER, LYOPHILIZED, FOR SOLUTION INTRAMUSCULAR; INTRAVENOUS EVERY 8 HOURS
Status: DISCONTINUED | OUTPATIENT
Start: 2019-01-01 | End: 2019-01-01

## 2019-01-01 RX ORDER — HYDROXYCHLOROQUINE SULFATE 200 MG/1
200 TABLET, FILM COATED ORAL 2 TIMES DAILY
COMMUNITY

## 2019-01-01 RX ORDER — ASPIRIN 81 MG/1
324 TABLET, CHEWABLE ORAL ONCE
Status: COMPLETED | OUTPATIENT
Start: 2019-01-01 | End: 2019-01-01

## 2019-01-01 RX ORDER — IOPAMIDOL 755 MG/ML
100 INJECTION, SOLUTION INTRAVASCULAR ONCE
Status: COMPLETED | OUTPATIENT
Start: 2019-01-01 | End: 2019-01-01

## 2019-01-01 RX ORDER — PANTOPRAZOLE SODIUM 40 MG/1
40 TABLET, DELAYED RELEASE ORAL EVERY EVENING
COMMUNITY

## 2019-01-01 RX ORDER — MORPHINE SULFATE 100 MG/5ML
2-4 SOLUTION ORAL
Status: DISCONTINUED | OUTPATIENT
Start: 2019-01-01 | End: 2019-01-01 | Stop reason: HOSPADM

## 2019-01-01 RX ORDER — ACETAMINOPHEN 325 MG/1
650 TABLET ORAL EVERY 4 HOURS PRN
Status: DISCONTINUED | OUTPATIENT
Start: 2019-01-01 | End: 2019-01-01 | Stop reason: HOSPADM

## 2019-01-01 RX ORDER — ACETAMINOPHEN 650 MG/1
650 SUPPOSITORY RECTAL EVERY 6 HOURS PRN
Status: DISCONTINUED | OUTPATIENT
Start: 2019-01-01 | End: 2019-01-01 | Stop reason: HOSPADM

## 2019-01-01 RX ORDER — ATORVASTATIN CALCIUM 40 MG/1
40 TABLET, FILM COATED ORAL DAILY
Status: ON HOLD | COMMUNITY
End: 2019-01-01

## 2019-01-01 RX ORDER — POTASSIUM CL/LIDO/0.9 % NACL 10MEQ/0.1L
10 INTRAVENOUS SOLUTION, PIGGYBACK (ML) INTRAVENOUS
Status: DISCONTINUED | OUTPATIENT
Start: 2019-01-01 | End: 2019-01-01

## 2019-01-01 RX ORDER — HYDROXYCHLOROQUINE SULFATE 200 MG/1
400 TABLET, FILM COATED ORAL DAILY
Qty: 180 TABLET | Refills: 3 | Status: SHIPPED | OUTPATIENT
Start: 2019-01-01 | End: 2019-01-01

## 2019-01-01 RX ORDER — PREDNISONE 1 MG/1
3 TABLET ORAL DAILY
COMMUNITY

## 2019-01-01 RX ORDER — HYDROMORPHONE HYDROCHLORIDE 1 MG/ML
0.2 INJECTION, SOLUTION INTRAMUSCULAR; INTRAVENOUS; SUBCUTANEOUS
Status: COMPLETED | OUTPATIENT
Start: 2019-01-01 | End: 2019-01-01

## 2019-01-01 RX ORDER — ACETAMINOPHEN 650 MG/1
650 SUPPOSITORY RECTAL EVERY 4 HOURS PRN
Status: DISCONTINUED | OUTPATIENT
Start: 2019-01-01 | End: 2019-01-01

## 2019-01-01 RX ORDER — BENZONATATE 100 MG/1
100 CAPSULE ORAL 3 TIMES DAILY PRN
Qty: 270 CAPSULE | Refills: 3 | Status: SHIPPED | OUTPATIENT
Start: 2019-01-01

## 2019-01-01 RX ORDER — ALENDRONATE SODIUM 70 MG/1
70 TABLET ORAL
Status: COMPLETED | OUTPATIENT
Start: 2019-01-01 | End: 2019-01-01

## 2019-01-01 RX ORDER — ASPIRIN 81 MG/1
81 TABLET ORAL DAILY
COMMUNITY

## 2019-01-01 RX ORDER — METHYLPREDNISOLONE SODIUM SUCCINATE 40 MG/ML
40 INJECTION, POWDER, LYOPHILIZED, FOR SOLUTION INTRAMUSCULAR; INTRAVENOUS DAILY
Status: DISCONTINUED | OUTPATIENT
Start: 2019-01-01 | End: 2019-01-01

## 2019-01-01 RX ORDER — BISACODYL 10 MG
10 SUPPOSITORY, RECTAL RECTAL DAILY
Status: DISCONTINUED | OUTPATIENT
Start: 2019-01-01 | End: 2019-01-01

## 2019-01-01 RX ORDER — CLOPIDOGREL BISULFATE 75 MG/1
75 TABLET ORAL DAILY
Status: ON HOLD | COMMUNITY
End: 2019-01-01

## 2019-01-01 RX ORDER — POTASSIUM CHLORIDE 7.45 MG/ML
10 INJECTION INTRAVENOUS
Status: DISCONTINUED | OUTPATIENT
Start: 2019-01-01 | End: 2019-01-01

## 2019-01-01 RX ORDER — HYDRALAZINE HYDROCHLORIDE 20 MG/ML
5 INJECTION INTRAMUSCULAR; INTRAVENOUS EVERY 4 HOURS PRN
Status: DISCONTINUED | OUTPATIENT
Start: 2019-01-01 | End: 2019-01-01

## 2019-01-01 RX ORDER — SULFAMETHOXAZOLE/TRIMETHOPRIM 800-160 MG
1 TABLET ORAL
Status: DISCONTINUED | OUTPATIENT
Start: 2019-01-01 | End: 2019-01-01

## 2019-01-01 RX ORDER — METOPROLOL TARTRATE 1 MG/ML
2.5 INJECTION, SOLUTION INTRAVENOUS EVERY 6 HOURS
Status: DISCONTINUED | OUTPATIENT
Start: 2019-01-01 | End: 2019-01-01

## 2019-01-01 RX ORDER — ACETAMINOPHEN 500 MG
500 TABLET ORAL EVERY 6 HOURS PRN
COMMUNITY

## 2019-01-01 RX ORDER — AMOXICILLIN 250 MG
2 CAPSULE ORAL 2 TIMES DAILY PRN
Status: DISCONTINUED | OUTPATIENT
Start: 2019-01-01 | End: 2019-01-01 | Stop reason: HOSPADM

## 2019-01-01 RX ORDER — HYDROXYCHLOROQUINE SULFATE 200 MG/1
400 TABLET, FILM COATED ORAL DAILY
Qty: 60 TABLET | Refills: 0 | OUTPATIENT
Start: 2019-01-01

## 2019-01-01 RX ORDER — BISACODYL 10 MG
10 SUPPOSITORY, RECTAL RECTAL
Status: DISCONTINUED | OUTPATIENT
Start: 2019-10-26 | End: 2019-01-01 | Stop reason: HOSPADM

## 2019-01-01 RX ORDER — PANTOPRAZOLE SODIUM 40 MG/1
40 TABLET, DELAYED RELEASE ORAL EVERY EVENING
Status: DISCONTINUED | OUTPATIENT
Start: 2019-01-01 | End: 2019-01-01 | Stop reason: HOSPADM

## 2019-01-01 RX ORDER — VITAMIN B COMPLEX
TABLET ORAL
Status: ON HOLD | COMMUNITY
End: 2019-01-01

## 2019-01-01 RX ORDER — CLOPIDOGREL BISULFATE 75 MG/1
75 TABLET ORAL DAILY
Status: DISCONTINUED | OUTPATIENT
Start: 2019-01-01 | End: 2019-01-01 | Stop reason: HOSPADM

## 2019-01-01 RX ORDER — METOPROLOL TARTRATE 1 MG/ML
2.5 INJECTION, SOLUTION INTRAVENOUS EVERY 4 HOURS PRN
Status: DISCONTINUED | OUTPATIENT
Start: 2019-01-01 | End: 2019-01-01

## 2019-01-01 RX ORDER — ACETAMINOPHEN 650 MG/1
650 SUPPOSITORY RECTAL EVERY 8 HOURS
Status: DISCONTINUED | OUTPATIENT
Start: 2019-01-01 | End: 2019-01-01

## 2019-01-01 RX ORDER — GABAPENTIN 100 MG/1
100 CAPSULE ORAL
Status: CANCELLED | OUTPATIENT
Start: 2019-01-01

## 2019-01-01 RX ORDER — PREDNISONE 10 MG/1
TABLET ORAL
Qty: 40 TABLET | Refills: 0 | Status: SHIPPED | OUTPATIENT
Start: 2019-01-01 | End: 2019-01-01

## 2019-01-01 RX ORDER — ONDANSETRON 4 MG/1
4 TABLET, ORALLY DISINTEGRATING ORAL EVERY 6 HOURS PRN
Status: DISCONTINUED | OUTPATIENT
Start: 2019-01-01 | End: 2019-01-01 | Stop reason: HOSPADM

## 2019-01-01 RX ORDER — OLANZAPINE 5 MG/1
5 TABLET, ORALLY DISINTEGRATING ORAL ONCE
Status: DISCONTINUED | OUTPATIENT
Start: 2019-01-01 | End: 2019-01-01

## 2019-01-01 RX ORDER — SODIUM CHLORIDE, SODIUM LACTATE, POTASSIUM CHLORIDE, CALCIUM CHLORIDE 600; 310; 30; 20 MG/100ML; MG/100ML; MG/100ML; MG/100ML
INJECTION, SOLUTION INTRAVENOUS CONTINUOUS
Status: CANCELLED | OUTPATIENT
Start: 2019-01-01

## 2019-01-01 RX ORDER — IPRATROPIUM BROMIDE AND ALBUTEROL SULFATE 2.5; .5 MG/3ML; MG/3ML
3 SOLUTION RESPIRATORY (INHALATION) EVERY 4 HOURS PRN
Status: DISCONTINUED | OUTPATIENT
Start: 2019-01-01 | End: 2019-01-01 | Stop reason: HOSPADM

## 2019-01-01 RX ORDER — OLANZAPINE 5 MG/1
5 TABLET, ORALLY DISINTEGRATING ORAL ONCE
Status: COMPLETED | OUTPATIENT
Start: 2019-01-01 | End: 2019-01-01

## 2019-01-01 RX ORDER — NALOXONE HYDROCHLORIDE 0.4 MG/ML
.1-.4 INJECTION, SOLUTION INTRAMUSCULAR; INTRAVENOUS; SUBCUTANEOUS
Status: DISCONTINUED | OUTPATIENT
Start: 2019-01-01 | End: 2019-01-01

## 2019-01-01 RX ORDER — SULFAMETHOXAZOLE/TRIMETHOPRIM 800-160 MG
1 TABLET ORAL
Qty: 36 TABLET | Refills: 3 | Status: SHIPPED | OUTPATIENT
Start: 2019-01-01

## 2019-01-01 RX ORDER — HYDROXYCHLOROQUINE SULFATE 200 MG/1
200 TABLET, FILM COATED ORAL DAILY
Status: DISCONTINUED | OUTPATIENT
Start: 2019-01-01 | End: 2019-01-01 | Stop reason: HOSPADM

## 2019-01-01 RX ORDER — PREDNISONE 20 MG/1
40 TABLET ORAL DAILY
Status: DISCONTINUED | OUTPATIENT
Start: 2019-01-01 | End: 2019-01-01 | Stop reason: HOSPADM

## 2019-01-01 RX ORDER — PREDNISONE 5 MG/1
5 TABLET ORAL DAILY
COMMUNITY

## 2019-01-01 RX ORDER — IOPAMIDOL 755 MG/ML
54 INJECTION, SOLUTION INTRAVASCULAR ONCE
Status: COMPLETED | OUTPATIENT
Start: 2019-01-01 | End: 2019-01-01

## 2019-01-01 RX ORDER — POTASSIUM CHLORIDE 29.8 MG/ML
20 INJECTION INTRAVENOUS
Status: DISCONTINUED | OUTPATIENT
Start: 2019-01-01 | End: 2019-01-01

## 2019-01-01 RX ORDER — GLYCOPYRROLATE 0.2 MG/ML
.2-.4 INJECTION, SOLUTION INTRAMUSCULAR; INTRAVENOUS EVERY 4 HOURS PRN
Status: DISCONTINUED | OUTPATIENT
Start: 2019-01-01 | End: 2019-01-01 | Stop reason: HOSPADM

## 2019-01-01 RX ORDER — LIDOCAINE HYDROCHLORIDE 20 MG/ML
JELLY TOPICAL
Status: COMPLETED
Start: 2019-01-01 | End: 2019-01-01

## 2019-01-01 RX ORDER — BENZONATATE 100 MG/1
100 CAPSULE ORAL 3 TIMES DAILY PRN
Status: DISCONTINUED | OUTPATIENT
Start: 2019-01-01 | End: 2019-01-01 | Stop reason: HOSPADM

## 2019-01-01 RX ORDER — LOSARTAN POTASSIUM 25 MG/1
25 TABLET ORAL DAILY
Status: DISCONTINUED | OUTPATIENT
Start: 2019-01-01 | End: 2019-01-01

## 2019-01-01 RX ORDER — MORPHINE SULFATE 2 MG/ML
2-4 INJECTION, SOLUTION INTRAMUSCULAR; INTRAVENOUS
Status: DISCONTINUED | OUTPATIENT
Start: 2019-01-01 | End: 2019-01-01 | Stop reason: HOSPADM

## 2019-01-01 RX ORDER — SODIUM CHLORIDE 9 MG/ML
INJECTION, SOLUTION INTRAVENOUS CONTINUOUS
Status: DISCONTINUED | OUTPATIENT
Start: 2019-01-01 | End: 2019-01-01

## 2019-01-01 RX ORDER — PREDNISONE 5 MG/1
TABLET ORAL
Qty: 90 TABLET | Refills: 3 | Status: SHIPPED | OUTPATIENT
Start: 2019-01-01 | End: 2019-01-01

## 2019-01-01 RX ORDER — PREDNISONE 10 MG/1
10 TABLET ORAL DAILY
Qty: 90 TABLET | Refills: 3 | Status: ON HOLD | OUTPATIENT
Start: 2019-01-01 | End: 2019-01-01

## 2019-01-01 RX ORDER — HYDRALAZINE HYDROCHLORIDE 20 MG/ML
10 INJECTION INTRAMUSCULAR; INTRAVENOUS EVERY 4 HOURS PRN
Status: DISCONTINUED | OUTPATIENT
Start: 2019-01-01 | End: 2019-01-01

## 2019-01-01 RX ORDER — LORAZEPAM 2 MG/ML
1-2 INJECTION INTRAMUSCULAR
Status: DISCONTINUED | OUTPATIENT
Start: 2019-01-01 | End: 2019-01-01 | Stop reason: HOSPADM

## 2019-01-01 RX ORDER — LORAZEPAM 2 MG/ML
1 INJECTION INTRAMUSCULAR ONCE
Status: COMPLETED | OUTPATIENT
Start: 2019-01-01 | End: 2019-01-01

## 2019-01-01 RX ORDER — CEFAZOLIN SODIUM 2 G/100ML
2 INJECTION, SOLUTION INTRAVENOUS
Status: CANCELLED | OUTPATIENT
Start: 2019-01-01

## 2019-01-01 RX ORDER — ALENDRONATE SODIUM 70 MG/1
70 TABLET ORAL
Qty: 12 TABLET | Refills: 3 | Status: SHIPPED | OUTPATIENT
Start: 2019-01-01

## 2019-01-01 RX ORDER — ONDANSETRON 2 MG/ML
4 INJECTION INTRAMUSCULAR; INTRAVENOUS EVERY 6 HOURS PRN
Status: DISCONTINUED | OUTPATIENT
Start: 2019-01-01 | End: 2019-01-01 | Stop reason: HOSPADM

## 2019-01-01 RX ORDER — LIDOCAINE HYDROCHLORIDE 20 MG/ML
10 JELLY TOPICAL ONCE
Status: COMPLETED | OUTPATIENT
Start: 2019-01-01 | End: 2019-01-01

## 2019-01-01 RX ORDER — HALOPERIDOL 5 MG/ML
5 INJECTION INTRAMUSCULAR ONCE
Status: COMPLETED | OUTPATIENT
Start: 2019-01-01 | End: 2019-01-01

## 2019-01-01 RX ORDER — PREDNISONE 10 MG/1
10 TABLET ORAL DAILY
COMMUNITY

## 2019-01-01 RX ORDER — ASPIRIN 81 MG/1
81 TABLET ORAL DAILY
Status: DISCONTINUED | OUTPATIENT
Start: 2019-01-01 | End: 2019-01-01 | Stop reason: HOSPADM

## 2019-01-01 RX ORDER — ACYCLOVIR 200 MG/1
10 CAPSULE ORAL ONCE
Status: ACTIVE | OUTPATIENT
Start: 2019-01-01

## 2019-01-01 RX ORDER — LOSARTAN POTASSIUM 25 MG/1
25 TABLET ORAL DAILY
Status: DISCONTINUED | OUTPATIENT
Start: 2019-01-01 | End: 2019-01-01 | Stop reason: HOSPADM

## 2019-01-01 RX ORDER — SULFAMETHOXAZOLE/TRIMETHOPRIM 800-160 MG
1 TABLET ORAL
Status: DISCONTINUED | OUTPATIENT
Start: 2019-01-01 | End: 2019-01-01 | Stop reason: HOSPADM

## 2019-01-01 RX ORDER — ACETAMINOPHEN 500 MG
500 TABLET ORAL EVERY 6 HOURS PRN
Status: DISCONTINUED | OUTPATIENT
Start: 2019-01-01 | End: 2019-01-01 | Stop reason: HOSPADM

## 2019-01-01 RX ORDER — AMOXICILLIN 250 MG
1 CAPSULE ORAL 2 TIMES DAILY PRN
Status: DISCONTINUED | OUTPATIENT
Start: 2019-01-01 | End: 2019-01-01 | Stop reason: HOSPADM

## 2019-01-01 RX ORDER — POTASSIUM CHLORIDE 1.5 G/1.58G
20-40 POWDER, FOR SOLUTION ORAL
Status: DISCONTINUED | OUTPATIENT
Start: 2019-01-01 | End: 2019-01-01

## 2019-01-01 RX ORDER — ACETAMINOPHEN 325 MG/1
975 TABLET ORAL ONCE
Status: CANCELLED | OUTPATIENT
Start: 2019-01-01 | End: 2019-01-01

## 2019-01-01 RX ORDER — ATORVASTATIN CALCIUM 10 MG/1
10 TABLET, FILM COATED ORAL EVERY EVENING
Qty: 30 TABLET | Refills: 0 | Status: ON HOLD | OUTPATIENT
Start: 2019-01-01 | End: 2019-01-01

## 2019-01-01 RX ORDER — NALOXONE HYDROCHLORIDE 0.4 MG/ML
.1-.4 INJECTION, SOLUTION INTRAMUSCULAR; INTRAVENOUS; SUBCUTANEOUS
Status: DISCONTINUED | OUTPATIENT
Start: 2019-01-01 | End: 2019-01-01 | Stop reason: HOSPADM

## 2019-01-01 RX ORDER — LIDOCAINE 40 MG/G
CREAM TOPICAL
Status: DISCONTINUED | OUTPATIENT
Start: 2019-01-01 | End: 2019-01-01 | Stop reason: HOSPADM

## 2019-01-01 RX ORDER — ATROPINE SULFATE 10 MG/ML
1-2 SOLUTION/ DROPS OPHTHALMIC
Status: DISCONTINUED | OUTPATIENT
Start: 2019-01-01 | End: 2019-01-01 | Stop reason: HOSPADM

## 2019-01-01 RX ORDER — OLANZAPINE 5 MG/1
5 TABLET, ORALLY DISINTEGRATING ORAL 2 TIMES DAILY PRN
Status: DISCONTINUED | OUTPATIENT
Start: 2019-01-01 | End: 2019-01-01

## 2019-01-01 RX ORDER — GUAIFENESIN AND DEXTROMETHORPHAN HYDROBROMIDE 600; 30 MG/1; MG/1
1 TABLET, EXTENDED RELEASE ORAL 2 TIMES DAILY PRN
Status: DISCONTINUED | OUTPATIENT
Start: 2019-01-01 | End: 2019-01-01 | Stop reason: HOSPADM

## 2019-01-01 RX ORDER — ATORVASTATIN CALCIUM 10 MG/1
10 TABLET, FILM COATED ORAL EVERY EVENING
Status: DISCONTINUED | OUTPATIENT
Start: 2019-01-01 | End: 2019-01-01 | Stop reason: HOSPADM

## 2019-01-01 RX ORDER — CEFAZOLIN SODIUM 1 G/3ML
1 INJECTION, POWDER, FOR SOLUTION INTRAMUSCULAR; INTRAVENOUS SEE ADMIN INSTRUCTIONS
Status: CANCELLED | OUTPATIENT
Start: 2019-01-01

## 2019-01-01 RX ORDER — POTASSIUM CHLORIDE 1500 MG/1
20-40 TABLET, EXTENDED RELEASE ORAL
Status: DISCONTINUED | OUTPATIENT
Start: 2019-01-01 | End: 2019-01-01

## 2019-01-01 RX ORDER — HYDROXYCHLOROQUINE SULFATE 200 MG/1
400 TABLET, FILM COATED ORAL DAILY
Qty: 60 TABLET | Refills: 0 | Status: SHIPPED | OUTPATIENT
Start: 2019-01-01 | End: 2019-01-01

## 2019-01-01 RX ORDER — GUAIFENESIN AND DEXTROMETHORPHAN HYDROBROMIDE 600; 30 MG/1; MG/1
1 TABLET, EXTENDED RELEASE ORAL 2 TIMES DAILY PRN
Status: DISCONTINUED | OUTPATIENT
Start: 2019-01-01 | End: 2019-01-01

## 2019-01-01 RX ADMIN — DEXTROSE AND SODIUM CHLORIDE: 5; 900 INJECTION, SOLUTION INTRAVENOUS at 00:04

## 2019-01-01 RX ADMIN — ZOSTER VACCINE RECOMBINANT, ADJUVANTED 0.5 ML: KIT at 10:35

## 2019-01-01 RX ADMIN — LOSARTAN POTASSIUM 25 MG: 25 TABLET, FILM COATED ORAL at 20:41

## 2019-01-01 RX ADMIN — HYDRALAZINE HYDROCHLORIDE 5 MG: 20 INJECTION INTRAMUSCULAR; INTRAVENOUS at 20:26

## 2019-01-01 RX ADMIN — IPRATROPIUM BROMIDE AND ALBUTEROL SULFATE 3 ML: .5; 3 SOLUTION RESPIRATORY (INHALATION) at 17:42

## 2019-01-01 RX ADMIN — HUMAN ALBUMIN MICROSPHERES AND PERFLUTREN 9 ML: 10; .22 INJECTION, SOLUTION INTRAVENOUS at 14:36

## 2019-01-01 RX ADMIN — PREDNISONE 40 MG: 20 TABLET ORAL at 16:14

## 2019-01-01 RX ADMIN — HYDROMORPHONE HYDROCHLORIDE 1 MG: 1 INJECTION, SOLUTION INTRAMUSCULAR; INTRAVENOUS; SUBCUTANEOUS at 17:53

## 2019-01-01 RX ADMIN — ALENDRONATE SODIUM 70 MG: 70 TABLET ORAL at 06:51

## 2019-01-01 RX ADMIN — HYDRALAZINE HYDROCHLORIDE 5 MG: 20 INJECTION INTRAMUSCULAR; INTRAVENOUS at 00:06

## 2019-01-01 RX ADMIN — LOSARTAN POTASSIUM 25 MG: 25 TABLET, FILM COATED ORAL at 07:27

## 2019-01-01 RX ADMIN — IOPAMIDOL 58 ML: 755 INJECTION, SOLUTION INTRAVENOUS at 11:02

## 2019-01-01 RX ADMIN — LOSARTAN POTASSIUM 25 MG: 25 TABLET, FILM COATED ORAL at 07:58

## 2019-01-01 RX ADMIN — MORPHINE SULFATE 2 MG: 100 SOLUTION ORAL at 21:14

## 2019-01-01 RX ADMIN — HALOPERIDOL LACTATE 5 MG: 5 INJECTION, SOLUTION INTRAMUSCULAR at 18:45

## 2019-01-01 RX ADMIN — LIDOCAINE HYDROCHLORIDE: 20 JELLY TOPICAL at 06:33

## 2019-01-01 RX ADMIN — METHYLPREDNISOLONE SODIUM SUCCINATE 40 MG: 40 INJECTION, POWDER, FOR SOLUTION INTRAMUSCULAR; INTRAVENOUS at 17:08

## 2019-01-01 RX ADMIN — ACETAMINOPHEN 650 MG: 650 SUPPOSITORY RECTAL at 08:14

## 2019-01-01 RX ADMIN — PREDNISONE 40 MG: 20 TABLET ORAL at 07:26

## 2019-01-01 RX ADMIN — ASPIRIN 81 MG 324 MG: 81 TABLET ORAL at 10:02

## 2019-01-01 RX ADMIN — SODIUM CHLORIDE 500 ML: 9 INJECTION, SOLUTION INTRAVENOUS at 10:02

## 2019-01-01 RX ADMIN — ATORVASTATIN CALCIUM 10 MG: 10 TABLET, FILM COATED ORAL at 20:20

## 2019-01-01 RX ADMIN — METOPROLOL TARTRATE 2.5 MG: 5 INJECTION INTRAVENOUS at 04:53

## 2019-01-01 RX ADMIN — LORAZEPAM 1 MG: 2 INJECTION, SOLUTION INTRAMUSCULAR; INTRAVENOUS at 10:37

## 2019-01-01 RX ADMIN — ASPIRIN 81 MG: 81 TABLET, COATED ORAL at 07:57

## 2019-01-01 RX ADMIN — Medication 10 MG: at 08:14

## 2019-01-01 RX ADMIN — OLANZAPINE 5 MG: 5 TABLET, ORALLY DISINTEGRATING ORAL at 19:23

## 2019-01-01 RX ADMIN — Medication 10 MG: at 21:39

## 2019-01-01 RX ADMIN — METHYLPREDNISOLONE SODIUM SUCCINATE 40 MG: 40 INJECTION, POWDER, FOR SOLUTION INTRAMUSCULAR; INTRAVENOUS at 17:49

## 2019-01-01 RX ADMIN — CLOPIDOGREL BISULFATE 75 MG: 75 TABLET ORAL at 07:57

## 2019-01-01 RX ADMIN — HYDROXYCHLOROQUINE SULFATE 200 MG: 200 TABLET, FILM COATED ORAL at 07:58

## 2019-01-01 RX ADMIN — OLANZAPINE 5 MG: 5 TABLET, ORALLY DISINTEGRATING ORAL at 08:14

## 2019-01-01 RX ADMIN — ACETAMINOPHEN 650 MG: 650 SUPPOSITORY RECTAL at 15:35

## 2019-01-01 RX ADMIN — OLANZAPINE 5 MG: 5 TABLET, ORALLY DISINTEGRATING ORAL at 17:53

## 2019-01-01 RX ADMIN — OLANZAPINE 2.5 MG: 5 TABLET, ORALLY DISINTEGRATING ORAL at 06:13

## 2019-01-01 RX ADMIN — PANTOPRAZOLE SODIUM 40 MG: 40 TABLET, DELAYED RELEASE ORAL at 20:41

## 2019-01-01 RX ADMIN — PANTOPRAZOLE SODIUM 40 MG: 40 TABLET, DELAYED RELEASE ORAL at 20:20

## 2019-01-01 RX ADMIN — ASPIRIN 81 MG: 81 TABLET, COATED ORAL at 07:26

## 2019-01-01 RX ADMIN — CLOPIDOGREL BISULFATE 75 MG: 75 TABLET ORAL at 07:26

## 2019-01-01 RX ADMIN — HYDROXYCHLOROQUINE SULFATE 200 MG: 200 TABLET, FILM COATED ORAL at 17:08

## 2019-01-01 RX ADMIN — DEXTROSE AND SODIUM CHLORIDE: 5; 900 INJECTION, SOLUTION INTRAVENOUS at 11:22

## 2019-01-01 RX ADMIN — SODIUM CHLORIDE 75 ML: 9 INJECTION, SOLUTION INTRAVENOUS at 11:03

## 2019-01-01 RX ADMIN — LIDOCAINE HYDROCHLORIDE 6 ML: 20 JELLY TOPICAL at 15:00

## 2019-01-01 RX ADMIN — HYDROMORPHONE HYDROCHLORIDE 0.2 MG: 1 INJECTION, SOLUTION INTRAMUSCULAR; INTRAVENOUS; SUBCUTANEOUS at 16:58

## 2019-01-01 RX ADMIN — ATROPINE SULFATE 2 DROP: 10 SOLUTION OPHTHALMIC at 20:56

## 2019-01-01 RX ADMIN — ATROPINE SULFATE 2 DROP: 10 SOLUTION OPHTHALMIC at 00:04

## 2019-01-01 RX ADMIN — SULFAMETHOXAZOLE AND TRIMETHOPRIM 1 TABLET: 800; 160 TABLET ORAL at 20:41

## 2019-01-01 RX ADMIN — MORPHINE SULFATE 2 MG: 100 SOLUTION ORAL at 23:20

## 2019-01-01 RX ADMIN — METHYLPREDNISOLONE SODIUM SUCCINATE 40 MG: 40 INJECTION, POWDER, FOR SOLUTION INTRAMUSCULAR; INTRAVENOUS at 07:50

## 2019-01-01 RX ADMIN — ACETAMINOPHEN 650 MG: 650 SUPPOSITORY RECTAL at 10:07

## 2019-01-01 RX ADMIN — HYDROXYCHLOROQUINE SULFATE 200 MG: 200 TABLET, FILM COATED ORAL at 07:26

## 2019-01-01 RX ADMIN — OLANZAPINE 2.5 MG: 5 TABLET, ORALLY DISINTEGRATING ORAL at 10:36

## 2019-01-01 RX ADMIN — ATROPINE SULFATE 2 DROP: 10 SOLUTION OPHTHALMIC at 22:55

## 2019-01-01 RX ADMIN — ATROPINE SULFATE 2 DROP: 10 SOLUTION OPHTHALMIC at 19:14

## 2019-01-01 RX ADMIN — ACETAMINOPHEN 500 MG: 500 TABLET, FILM COATED ORAL at 08:07

## 2019-01-01 RX ADMIN — SODIUM CHLORIDE: 9 INJECTION, SOLUTION INTRAVENOUS at 00:13

## 2019-01-01 RX ADMIN — METOPROLOL TARTRATE 2.5 MG: 5 INJECTION INTRAVENOUS at 17:40

## 2019-01-01 RX ADMIN — SULFAMETHOXAZOLE AND TRIMETHOPRIM 1 TABLET: 800; 160 TABLET ORAL at 07:26

## 2019-01-01 RX ADMIN — OLANZAPINE 5 MG: 5 TABLET, ORALLY DISINTEGRATING ORAL at 19:52

## 2019-01-01 RX ADMIN — ACETAMINOPHEN 650 MG: 650 SUPPOSITORY RECTAL at 23:34

## 2019-01-01 RX ADMIN — IOPAMIDOL 54 ML: 755 INJECTION, SOLUTION INTRAVASCULAR at 12:44

## 2019-01-01 RX ADMIN — ACETAMINOPHEN 650 MG: 650 SUPPOSITORY RECTAL at 02:50

## 2019-01-01 RX ADMIN — METHYLPREDNISOLONE SODIUM SUCCINATE 40 MG: 40 INJECTION, POWDER, FOR SOLUTION INTRAMUSCULAR; INTRAVENOUS at 08:14

## 2019-01-01 RX ADMIN — ASPIRIN 81 MG: 81 TABLET, COATED ORAL at 17:08

## 2019-01-01 RX ADMIN — METOPROLOL TARTRATE 2.5 MG: 5 INJECTION INTRAVENOUS at 21:40

## 2019-01-01 RX ADMIN — CLOPIDOGREL BISULFATE 75 MG: 75 TABLET ORAL at 17:08

## 2019-01-01 RX ADMIN — METHYLPREDNISOLONE SODIUM SUCCINATE 40 MG: 40 INJECTION, POWDER, FOR SOLUTION INTRAMUSCULAR; INTRAVENOUS at 00:49

## 2019-01-01 ASSESSMENT — ACTIVITIES OF DAILY LIVING (ADL)
ADLS_ACUITY_SCORE: 22
DRESS: 0-->INDEPENDENT
ADLS_ACUITY_SCORE: 22
ADLS_ACUITY_SCORE: 21
ADLS_ACUITY_SCORE: 14
SWALLOWING: 0-->SWALLOWS FOODS/LIQUIDS WITHOUT DIFFICULTY
ADLS_ACUITY_SCORE: 22
ADLS_ACUITY_SCORE: 14
ADLS_ACUITY_SCORE: 22
TRANSFERRING: 1-->ASSISTIVE EQUIPMENT
COGNITION: 0 - NO COGNITION ISSUES REPORTED
ADLS_ACUITY_SCORE: 26
ADLS_ACUITY_SCORE: 21
ADLS_ACUITY_SCORE: 22
RETIRED_COMMUNICATION: 0-->UNDERSTANDS/COMMUNICATES WITHOUT DIFFICULTY
BATHING: 1-->ASSISTIVE EQUIPMENT
ADLS_ACUITY_SCORE: 16
AMBULATION: 1-->ASSISTIVE EQUIPMENT
ADLS_ACUITY_SCORE: 22
ADLS_ACUITY_SCORE: 19
ADLS_ACUITY_SCORE: 22
ADLS_ACUITY_SCORE: 16
ADLS_ACUITY_SCORE: 16
ADLS_ACUITY_SCORE: 19
ADLS_ACUITY_SCORE: 14
RETIRED_EATING: 0-->INDEPENDENT
ADLS_ACUITY_SCORE: 22
ADLS_ACUITY_SCORE: 16
TOILETING: 0-->INDEPENDENT
ADLS_ACUITY_SCORE: 16
ADLS_ACUITY_SCORE: 26
ADLS_ACUITY_SCORE: 16

## 2019-01-01 ASSESSMENT — MIFFLIN-ST. JEOR
SCORE: 1272.38
SCORE: 1315.47
SCORE: 1309.57

## 2019-01-01 ASSESSMENT — ENCOUNTER SYMPTOMS
STIFFNESS: 1
NECK PAIN: 0
ARTHRALGIAS: 0
COUGH: 0
FEVER: 0
MUSCLE CRAMPS: 0
NECK PAIN: 1
APPETITE CHANGE: 1
FEVER: 0
SWOLLEN GLANDS: 0
BACK PAIN: 1
MUSCLE WEAKNESS: 1
VOMITING: 0
BRUISES/BLEEDS EASILY: 1
SHORTNESS OF BREATH: 1
WHEEZING: 0
MYALGIAS: 1
JOINT SWELLING: 0

## 2019-01-01 ASSESSMENT — PAIN SCALES - GENERAL
PAINLEVEL: MODERATE PAIN (4)
PAINLEVEL: NO PAIN (0)
PAINLEVEL: MODERATE PAIN (5)

## 2019-04-19 NOTE — DISCHARGE INSTRUCTIONS

## 2019-04-19 NOTE — PROGRESS NOTES
[facility]    Reason for Visit  J Luis Wolf is a 79 year old year old male who is being seen for Follow Up (ra/ild)    HPI    Mr. Wolf is a 79-year-old with rheumatoid arthritis interstitial lung disease who is here for follow-up.  He spent the winter in [place] and did well there.  He and his wife drove back to [place] last week, and it took 2 days to drive back.  When they got to [place], he started to feel achy all over and more short of breath.  He also reports feeling weaker.  He denies cough, fevers, chest pain, syncope, sore throat, or rhinitis.  He does not feel sick.  He also reports pain in his right leg along the side, and his wife reports he has nerve entrapment.  He continues on oxygen 2 to 4 L at home.  He currently takes prednisone 15 mg/day for the rheumatoid arthritis.        Current Outpatient Medications   Medication     Acetaminophen (TYLENOL PO)     alendronate (FOSAMAX) 70 MG tablet     atorvastatin (LIPITOR) 10 MG tablet     azelastine (ASTELIN) 137 MCG/SPRAY nasal spray     benzonatate (TESSALON) 100 MG capsule     Calcium Citrate-Vitamin D ( CALCIUM CITRATE+/VIT D3 PO)     cetirizine (ZYRTEC) 10 MG tablet     Cholecalciferol (D3-1000 PO)     Dextromethorphan-Guaifenesin (MUCINEX DM PO)     hydroxychloroquine (PLAQUENIL) 200 MG tablet     losartan (COZAAR) 25 MG tablet     Multiple Vitamin (DAILY MULTIVITAMIN PO)     predniSONE (DELTASONE) 10 MG tablet     predniSONE (DELTASONE) 5 MG tablet     sulfamethoxazole-trimethoprim (BACTRIM DS/SEPTRA DS) 800-160 MG tablet     No current facility-administered medications for this visit.      Allergies   Allergen Reactions     Flu Virus Vaccine      Swollen face     Pistachios [Nuts] Swelling and Cough     Not tested medically     Past Medical History:   Diagnosis Date     ILD (interstitial lung disease) (H)     associated with rheumatoid arthritis, present on CT since 2013 and also has air  trapping on CT, methotrexate stopped 2013, started MMF 2014 but stopped  due to 20 lb weight loss and anorexia.  Did not tolerate imuran due to chills/fevers.     Rheumatoid arthritis (H)     RA dx , + CCP and RF.       Past Surgical History:   Procedure Laterality Date     APPENDECTOMY       BACK SURGERY      Compression fx thoracic vertebra     COLONOSCOPY       ORTHOPEDIC SURGERY      Bunionectomy lt foot     PERCUTANEOUS BIOPSY LIVER N/A 5/10/2018    Procedure: PERCUTANEOUS BIOPSY LIVER;  Percutaneous Liver Biopsy;  Surgeon: Raymon Gann PA-C;  Location: [facility] OR     SOFT TISSUE SURGERY      Bursitis rt hip cortisone injection 11 15 2015     THORACIC SURGERY      Pulmonary fibrosis       Social History     Socioeconomic History     Marital status:      Spouse name: Not on file     Number of children: Not on file     Years of education: Not on file     Highest education level: Not on file   Occupational History     Not on file   Social Needs     Financial resource strain: Not on file     Food insecurity:     Worry: Not on file     Inability: Not on file     Transportation needs:     Medical: Not on file     Non-medical: Not on file   Tobacco Use     Smoking status: Former Smoker     Packs/day: 0.50     Years: 5.00     Pack years: 2.50     Types: Cigarettes     Start date: 1957     Last attempt to quit: 1962     Years since quittin.3     Smokeless tobacco: Never Used   Substance and Sexual Activity     Alcohol use: Yes     Alcohol/week: 1.8 oz     Types: 3 Standard drinks or equivalent per week     Drug use: No     Sexual activity: Not on file   Lifestyle     Physical activity:     Days per week: Not on file     Minutes per session: Not on file     Stress: Not on file   Relationships     Social connections:     Talks on phone: Not on file     Gets together: Not on file     Attends Jainism service: Not on file     Active member of club or  "organization: Not on file     Attends meetings of clubs or organizations: Not on file     Relationship status: Not on file     Intimate partner violence:     Fear of current or ex partner: Not on file     Emotionally abused: Not on file     Physically abused: Not on file     Forced sexual activity: Not on file   Other Topics Concern     Parent/sibling w/ CABG, MI or angioplasty before 65F 55M? 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       Family History   Problem Relation Age of Onset     Coronary Artery Disease Father         Age 77  age 80     Hyperlipidemia Father      Hypertension Mother          age 105     Rheumatoid Arthritis No family hx of              ROS Pulmonary  A complete ROS was otherwise negative except as noted in the HPI.    Vitals: /75   Pulse 95   Ht 1.702 m (5' 7\")   Wt 64.2 kg (141 lb 8 oz)   SpO2 97%   BMI 22.16 kg/m      Exam:   GENERAL APPEARANCE: Well developed, well nourished, alert, and in no apparent distress.  RESP: good air flow throughout.  Bibasilar inspiratory crackles. No rhonchi. No wheezes.  CV: Normal S1, S2, regular rhythm, normal rate. No murmur.  No LE edema.   MS: extremities normal. No clubbing. No cyanosis.  SKIN: no rash on limited exam.  NEURO: Mentation intact, speech normal, normal gait and stance.  PSYCH: mentation appears normal. and affect normal/bright.    Results:  Recent Results (from the past 168 hour(s))   6 minute walk test    Collection Time: 19 12:00 AM   Result Value Ref Range    6 min walk (FT) 550 ft    6 Min Walk (M) 168 m   General PFT Lab (Please always keep checked)    Collection Time: 19  9:53 AM   Result Value Ref Range    FVC-Pred 3.41 L    FVC-Pre 1.04 L    FVC-%Pred-Pre 30 %    FEV1-Pre 0.75 L    FEV1-%Pred-Pre 29 %    FEV1FVC-Pred 75 %    FEV1FVC-Pre 72 %    FEFMax-Pred 6.48 L/sec    FEFMax-Pre 3.59 L/sec    " FEFMax-%Pred-Pre 55 %    FEF2575-Pred 1.87 L/sec    FEF2575-Pre 0.46 L/sec    VZF6917-%Pred-Pre 24 %    ExpTime-Pre 5.39 sec    FIFMax-Pre 2.53 L/sec    VC-Pred 3.85 L    VC-Pre 1.08 L    VC-%Pred-Pre 28 %    IC-Pred 2.90 L    IC-Pre 0.77 L    IC-%Pred-Pre 26 %    ERV-Pred 0.95 L    ERV-Pre 0.31 L    ERV-%Pred-Pre 32 %    FEV1FEV6-Pred 76 %    FEV1FEV6-Pre 72 %    FRCPleth-Pred 3.54 L    FRCPleth-Pre 1.55 L    FRCPleth-%Pred-Pre 43 %    RVPleth-Pred 2.70 L    RVPleth-Pre 1.24 L    RVPleth-%Pred-Pre 45 %    TLCPleth-Pred 6.31 L    TLCPleth-Pre 2.32 L    TLCPleth-%Pred-Pre 36 %    DLCOunc-Pred 21.62 ml/min/mmHg    DLCOunc-Pre 9.25 ml/min/mmHg    DLCOunc-%Pred-Pre 42 %    VA-Pre 2.61 L    VA-%Pred-Pre 47 %    FIO2-Pre 6.00 %    FEV1SVC-Pred 66 %    FEV1SVC-Pre 69 %   Creatinine POCT    Collection Time: 04/19/19 12:33 PM   Result Value Ref Range    Creatinine 1.0 0.66 - 1.25 mg/dL    GFR Estimate 72 >60 mL/min/[1.73_m2]    GFR Estimate If Black 87 >60 mL/min/[1.73_m2]       I reviewed pulmonary function test was performed today.  This shows severe restriction with moderate diffusion defect.  Lung function is stable compared to 4 months ago before he left for [place].    I reviewed results with the patient.      Assessment and plan:     Mr. Wolf is a 79-year-old with rheumatoid arthritis ILD who is here for follow-up.    1.  Rheumatoid arthritis ILD.  He reports feeling more short of breath over the past week since his return from [place].  I am concerned about PE given the long drive back from [place] to [place] and the pain in his right leg.  I will get a CT angiogram today to rule out a PE.  His PFT is stable from 4 months ago, so I think it is unlikely that he is having worsening of his rheumatoid ILD or an acute exacerbation.  The chest CT will also be helpful to evaluate this.  He will see his rheumatologist next week, and he can have an evaluation of the arthritis in the myalgias at that time as well.  We will  make a follow-up appointment for him to see me in about 3 months with repeat PFT.  He will return to clinic today after his chest CT to discuss the results.    2.  Exertional hypoxia.  6-minute walk test today showed hypoxia on 4 L, down to oxygen saturation of 87%.  On 6 L/min, there was desaturation but no hypoxia with lowest oxygen saturation being 91%.  Walk distance was reduced today compared to last year in August when he did not desaturate on 4 L/min oxygen.  I will have my nurse talk with him today about increasing his oxygen at home to 4 L/min with activity.    Addendum: chest CT today shows no PE.  The ILD is unchanged.  We have informed the patient of these results.  The prednisone dose should stay the same at this time.  There is no need to add new treatment for rheumatoid ILD as it is stable by PFT and chest CT.  I will get an echocardiogram to further evaluate the shortness of breath.  We are looking for pulmonary hypertension or ventricular dysfunction.    Addendum: Echo is normal.

## 2019-04-19 NOTE — NURSING NOTE
Chief Complaint   Patient presents with     Follow Up     ra/ild     Vitals were taken and medications were reconciled.     PATRICIA Parisi

## 2019-04-19 NOTE — LETTER
4/19/2019       RE: J Luis Wolf  [id number] [facility] [id number]     Dear Colleague,    Thank you for referring your patient, J Luis Wolf, to the [facility] at [facility]. Please see a copy of my visit note below.       [facility]    Reason for Visit  J Luis Wolf is a 79 year old year old male who is being seen for Follow Up (ra/ild)    HPI    Mr. Wolf is a 79-year-old with rheumatoid arthritis interstitial lung disease who is here for follow-up.  He spent the winter in [place] and did well there.  He and his wife drove back to [place] last week, and it took 2 days to drive back.  When they got to [place], he started to feel achy all over and more short of breath.  He also reports feeling weaker.  He denies cough, fevers, chest pain, syncope, sore throat, or rhinitis.  He does not feel sick.  He also reports pain in his right leg along the side, and his wife reports he has nerve entrapment.  He continues on oxygen 2 to 4 L at home.  He currently takes prednisone 15 mg/day for the rheumatoid arthritis.        Current Outpatient Medications   Medication     Acetaminophen (TYLENOL PO)     alendronate (FOSAMAX) 70 MG tablet     atorvastatin (LIPITOR) 10 MG tablet     azelastine (ASTELIN) 137 MCG/SPRAY nasal spray     benzonatate (TESSALON) 100 MG capsule     Calcium Citrate-Vitamin D (SM CALCIUM CITRATE+/VIT D3 PO)     cetirizine (ZYRTEC) 10 MG tablet     Cholecalciferol (D3-1000 PO)     Dextromethorphan-Guaifenesin (MUCINEX DM PO)     hydroxychloroquine (PLAQUENIL) 200 MG tablet     losartan (COZAAR) 25 MG tablet     Multiple Vitamin (DAILY MULTIVITAMIN PO)     predniSONE (DELTASONE) 10 MG tablet     predniSONE (DELTASONE) 5 MG tablet     sulfamethoxazole-trimethoprim (BACTRIM DS/SEPTRA DS) 800-160 MG tablet     No current facility-administered medications for this visit.       Allergies   Allergen Reactions     Flu Virus Vaccine      Swollen face     Pistachios [Nuts] Swelling and Cough     Not tested medically     Past Medical History:   Diagnosis Date     ILD (interstitial lung disease) (H)     associated with rheumatoid arthritis, present on CT since  and also has air trapping on CT, methotrexate stopped 2013, started MMF 2014 but stopped  due to 20 lb weight loss and anorexia.  Did not tolerate imuran due to chills/fevers.     Rheumatoid arthritis (H)     RA dx , + CCP and RF.       Past Surgical History:   Procedure Laterality Date     APPENDECTOMY       BACK SURGERY      Compression fx thoracic vertebra     COLONOSCOPY       ORTHOPEDIC SURGERY      Bunionectomy lt foot     PERCUTANEOUS BIOPSY LIVER N/A 5/10/2018    Procedure: PERCUTANEOUS BIOPSY LIVER;  Percutaneous Liver Biopsy;  Surgeon: Raymon Gann PA-C;  Location: [facility] OR     SOFT TISSUE SURGERY      Bursitis rt hip cortisone injection 11 15 2015     THORACIC SURGERY      Pulmonary fibrosis       Social History     Socioeconomic History     Marital status:      Spouse name: Not on file     Number of children: Not on file     Years of education: Not on file     Highest education level: Not on file   Occupational History     Not on file   Social Needs     Financial resource strain: Not on file     Food insecurity:     Worry: Not on file     Inability: Not on file     Transportation needs:     Medical: Not on file     Non-medical: Not on file   Tobacco Use     Smoking status: Former Smoker     Packs/day: 0.50     Years: 5.00     Pack years: 2.50     Types: Cigarettes     Start date: 1957     Last attempt to quit: 1962     Years since quittin.3     Smokeless tobacco: Never Used   Substance and Sexual Activity     Alcohol use: Yes     Alcohol/week: 1.8 oz     Types: 3 Standard drinks or equivalent per week     Drug use: No     Sexual activity: Not on file  "  Lifestyle     Physical activity:     Days per week: Not on file     Minutes per session: Not on file     Stress: Not on file   Relationships     Social connections:     Talks on phone: Not on file     Gets together: Not on file     Attends Hindu service: Not on file     Active member of club or organization: Not on file     Attends meetings of clubs or organizations: Not on file     Relationship status: Not on file     Intimate partner violence:     Fear of current or ex partner: Not on file     Emotionally abused: Not on file     Physically abused: Not on file     Forced sexual activity: Not on file   Other Topics Concern     Parent/sibling w/ CABG, MI or angioplasty before 65F 55M? 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       Family History   Problem Relation Age of Onset     Coronary Artery Disease Father         Age 77  age 80     Hyperlipidemia Father      Hypertension Mother          age 105     Rheumatoid Arthritis No family hx of              ROS Pulmonary  A complete ROS was otherwise negative except as noted in the HPI.    Vitals: /75   Pulse 95   Ht 1.702 m (5' 7\")   Wt 64.2 kg (141 lb 8 oz)   SpO2 97%   BMI 22.16 kg/m       Exam:   GENERAL APPEARANCE: Well developed, well nourished, alert, and in no apparent distress.  RESP: good air flow throughout.  Bibasilar inspiratory crackles. No rhonchi. No wheezes.  CV: Normal S1, S2, regular rhythm, normal rate. No murmur.  No LE edema.   MS: extremities normal. No clubbing. No cyanosis.  SKIN: no rash on limited exam.  NEURO: Mentation intact, speech normal, normal gait and stance.  PSYCH: mentation appears normal. and affect normal/bright.    Results:  Recent Results (from the past 168 hour(s))   6 minute walk test    Collection Time: 19 12:00 AM   Result Value Ref Range    6 min walk (FT) 550 ft    6 Min Walk (M) 168 m "   General PFT Lab (Please always keep checked)    Collection Time: 04/19/19  9:53 AM   Result Value Ref Range    FVC-Pred 3.41 L    FVC-Pre 1.04 L    FVC-%Pred-Pre 30 %    FEV1-Pre 0.75 L    FEV1-%Pred-Pre 29 %    FEV1FVC-Pred 75 %    FEV1FVC-Pre 72 %    FEFMax-Pred 6.48 L/sec    FEFMax-Pre 3.59 L/sec    FEFMax-%Pred-Pre 55 %    FEF2575-Pred 1.87 L/sec    FEF2575-Pre 0.46 L/sec    [id number]-%Pred-Pre 24 %    ExpTime-Pre 5.39 sec    FIFMax-Pre 2.53 L/sec    VC-Pred 3.85 L    VC-Pre 1.08 L    VC-%Pred-Pre 28 %    IC-Pred 2.90 L    IC-Pre 0.77 L    IC-%Pred-Pre 26 %    ERV-Pred 0.95 L    ERV-Pre 0.31 L    ERV-%Pred-Pre 32 %    FEV1FEV6-Pred 76 %    FEV1FEV6-Pre 72 %    FRCPleth-Pred 3.54 L    FRCPleth-Pre 1.55 L    FRCPleth-%Pred-Pre 43 %    RVPleth-Pred 2.70 L    RVPleth-Pre 1.24 L    RVPleth-%Pred-Pre 45 %    TLCPleth-Pred 6.31 L    TLCPleth-Pre 2.32 L    TLCPleth-%Pred-Pre 36 %    DLCOunc-Pred 21.62 ml/min/mmHg    DLCOunc-Pre 9.25 ml/min/mmHg    DLCOunc-%Pred-Pre 42 %    VA-Pre 2.61 L    VA-%Pred-Pre 47 %    FIO2-Pre 6.00 %    FEV1SVC-Pred 66 %    FEV1SVC-Pre 69 %   Creatinine POCT    Collection Time: 04/19/19 12:33 PM   Result Value Ref Range    Creatinine 1.0 0.66 - 1.25 mg/dL    GFR Estimate 72 >60 mL/min/[1.73_m2]    GFR Estimate If Black 87 >60 mL/min/[1.73_m2]       I reviewed pulmonary function test was performed today.  This shows severe restriction with moderate diffusion defect.  Lung function is stable compared to 4 months ago before he left for [place].    I reviewed results with the patient.      Assessment and plan:     Mr. Wolf is a 79-year-old with rheumatoid arthritis ILD who is here for follow-up.    1.  Rheumatoid arthritis ILD.  He reports feeling more short of breath over the past week since his return from [place].  I am concerned about PE given the long drive back from [place] to [place] and the pain in his right leg.  I will get a CT angiogram today to rule out a PE.  His PFT is stable from 4  months ago, so I think it is unlikely that he is having worsening of his rheumatoid ILD or an acute exacerbation.  The chest CT will also be helpful to evaluate this.  He will see his rheumatologist next week, and he can have an evaluation of the arthritis in the myalgias at that time as well.  We will make a follow-up appointment for him to see me in about 3 months with repeat PFT.  He will return to clinic today after his chest CT to discuss the results.    2.  Exertional hypoxia.  6-minute walk test today showed hypoxia on 4 L, down to oxygen saturation of 87%.  On 6 L/min, there was desaturation but no hypoxia with lowest oxygen saturation being 91%.  Walk distance was reduced today compared to last year in August when he did not desaturate on 4 L/min oxygen.  I will have my nurse talk with him today about increasing his oxygen at home to 4 L/min with activity.    Addendum: chest CT today shows no PE.  The ILD is unchanged.  We have informed the patient of these results.  The prednisone dose should stay the same at this time.  There is no need to add new treatment for rheumatoid ILD as it is stable by PFT and chest CT.  I will get an echocardiogram to further evaluate the shortness of breath.  We are looking for pulmonary hypertension or ventricular dysfunction.    Again, thank you for allowing me to participate in the care of your patient.      Sincerely,    Nickie Millan MD

## 2019-04-23 NOTE — NURSING NOTE
"Chief Complaint   Patient presents with     RECHECK     RA       Vital signs:  Temp: 97.6  F (36.4  C) Temp src: Oral BP: 125/85 Pulse: 92   Resp: 16 SpO2: 96 %(3 lmp via NC)     Height: 170.2 cm (5' 7\") Weight: 63.6 kg (140 lb 3.2 oz)  Estimated body mass index is 21.96 kg/m  as calculated from the following:    Height as of this encounter: 1.702 m (5' 7\").    Weight as of this encounter: 63.6 kg (140 lb 3.2 oz).          Sallie Virk [facility]  4/23/2019 9:09 AM      "

## 2019-04-23 NOTE — PROGRESS NOTES
returns for management of RA complicated by ILD. +CCP, +RF, -GIANCARLO. No history of erosions. FVC 39%/DLCO 58% . Previously failed methotrexate, azathioprine, and mycophenolate. Prednisone dependent. Gabapentin failed due to somnolence.     He reports an exacerbation of his breathing a week ago, that he relates to traveling back to [place] in the care for [place]. This is a little improved today. CT is stable and echocardiogram is planned.     No redness, warmth or swelling to report. Function of the joints seems stable. Perhaps some toe deformities have worsened. Energy is improving today from a week ago. AM stiffness is 20 minutes.     Prednisone 15 mg daily and Hydroxychlorquine 400 mg daily with yearly normal eye checks. No infection to report. Tylenol 500 mg one tablet four times daily. Alendronate 70 mg once weekly with good tolerance.    PMI:  Medical-RA with ILD, atopic rhinitis, hip bursitis and gluteal tendinopathy, osteoporosis with T4-5 vertebral fractures (T = -1.6 ), hyperlipidemia, right rotator cuff disease, DDD/DJD with foraminal stenosis.  Surgical-appendectomy, bunion surgery and toe surgical straightening, right elbow surgery  Injury-right elbow laceration, right knee injury, low back injury    SH:  Retired ,  with two children. Former smoker (5 years with occasional cigarette use), 1 EtOH per day, no illicit drug use. Silica and Right handed.    FH:  Mother lived to very old age, now dead  Father dead at 80 with heart disease and OA  Brother with celiac disease  Children and grandchildren are healthy    PMSF history personally reviewed by me today.    ROS:  +radiating back pain on the right to the distal right LE  +white flakes on his skin and think skin  +seasonal allergy  Remainder of the 14 point ROS obtained and found negative.    Physical Exam:  Constitutional: WD-WN-WG cooperative; +continuous O2 therapy  Eyes: nl EOM, PERRLA, vision, conjunctiva,  sclera  ENT: nl external ears, nose, hearing, lips, teeth, gums, throat  Neck: no mass or thyroid enlargement  Resp: +lungs with bibasilar rales; nl to palpation, nl effort  CV: +irregularly irregular rhythm, no murmurs, rubs or gallops, no edema  GI: no ABD mass or tenderness, no HSM  : not tested  Lymph: no cervical or epitrochlear nodes  MS: +heberden's nodes with diffuse angular deformities; bilateral left>right wrist reduced extension and 1st CMC squaring;  +right shoulder with only minimal 30 degrees active abduction and limited rotation; neck rotation 30 degrees right and 45 left and no extension; All other TMJ, neck, shoulder, elbow, wrist, hand, spine, hip, knee, ankle, and foot joints were examined and otherwise found normal. Normal  strength. Intact tuck. Fairly normal prayer.  Skin: no nail pitting, alopecia, rash  Neuro: nl cranial nerves, strength, sensation, no DTRs.   Psych: nl judgement, orientation, memory, affect.    Laboratory:    Component      Latest Ref Rng & Units 11/6/2018   WBC      4.0 - 11.0 10e9/L 8.1   RBC Count      4.4 - 5.9 10e12/L 4.54   Hemoglobin      13.3 - 17.7 g/dL 13.5   Hematocrit      40.0 - 53.0 % 45.4   MCV      78 - 100 fl 100   MCH      26.5 - 33.0 pg 29.7   MCHC      31.5 - 36.5 g/dL 29.7 (L)   RDW      10.0 - 15.0 % 13.7   Platelet Count      150 - 450 10e9/L 193   Diff Method       Automated Method   % Neutrophils      % 66.5   % Lymphocytes      % 19.1   % Monocytes      % 10.2   % Eosinophils      % 2.7   % Basophils      % 0.9   % Immature Granulocytes      % 0.6   Nucleated RBCs      0 /100 0   Absolute Neutrophil      1.6 - 8.3 10e9/L 5.4   Absolute Lymphocytes      0.8 - 5.3 10e9/L 1.6   Absolute Monocytes      0.0 - 1.3 10e9/L 0.8   Absolute Eosinophils      0.0 - 0.7 10e9/L 0.2   Absolute Basophils      0.0 - 0.2 10e9/L 0.1   Abs Immature Granulocytes      0 - 0.4 10e9/L 0.1   Absolute Nucleated RBC       0.0   Sodium      133 - 144 mmol/L 139    Potassium      3.4 - 5.3 mmol/L 4.3   Chloride      94 - 109 mmol/L 96   Carbon Dioxide      20 - 32 mmol/L 39 (H)   Anion Gap      3 - 14 mmol/L 4   Glucose      70 - 99 mg/dL 88   Urea Nitrogen      7 - 30 mg/dL 24   Creatinine      0.66 - 1.25 mg/dL 1.03   GFR Estimate      >60 mL/min/1.7m2 70   GFR Estimate If Black      >60 mL/min/1.7m2 84   Calcium      8.5 - 10.1 mg/dL 8.7   Bilirubin Total      0.2 - 1.3 mg/dL 0.3   Albumin      3.4 - 5.0 g/dL 3.5   Protein Total      6.8 - 8.8 g/dL 7.2   Alkaline Phosphatase      40 - 150 U/L 61   ALT      0 - 70 U/L 37   AST      0 - 45 U/L 27   Color Urine       Diann   Appearance Urine       Slightly Cloudy   Glucose Urine      NEG:Negative mg/dL Negative   Bilirubin Urine      NEG:Negative Small (A)   Ketones Urine      NEG:Negative mg/dL 5 (A)   Specific Gravity Urine      1.003 - 1.035 >1.035 (H)   Blood Urine      NEG:Negative Negative   pH Urine      5.0 - 7.0 pH 5.0   Protein Albumin Urine      NEG:Negative mg/dL 30 (A)   Urobilinogen mg/dL      0.0 - 2.0 mg/dL 4.0 (H)   Nitrite Urine      NEG:Negative Negative   Leukocyte Esterase Urine      NEG:Negative Trace (A)   Source       Midstream Urine   WBC Urine      0 - 5 /HPF 1   RBC Urine      0 - 2 /HPF 1   Mucous Urine      NEG:Negative /LPF Present (A)   Hyaline Casts      0 - 2 /LPF 9 (H)   Sed Rate      0 - 20 mm/h 18   CRP Inflammation      0.0 - 8.0 mg/L 6.8     Impression:    Seropositive Rheumatoid Arthritis-with no signs of active systemic inflammation or synovitis today. He continues to tolerate the hydroxychloroquine well and we will continue this.     Osteoarthritis-stable disease.    Radiculopathy-with right distal leg pain.    Osteoporosis-on fosamax 70 mg weekly dose. With stable DEXA testing and no recent fracture.    Hepatitis-I will recheck his LFTs today.    Long term management of immunosuppression-lab testing is pending today. We agree to a 2 week trial off of the hydroxychloroquine medication  to determine whether this agent is contributing to chronic weight loss. He agrees to this plan. Continue routine eye checks. Shingrix immunization today and on RTC.    Plan RTC in 6 months.

## 2019-04-23 NOTE — NURSING NOTE
Prior to injection, verified patient identity using patient's name and date of birth.  Due to injection administration, patient instructed to remain in clinic for 15 minutes  afterwards, and to report any adverse reaction to me immediately.    First dose of Shingrix given per Dr. Roberts, injection given without complications or questions, see immunizations for details.    Reminded pt second dose is due in 8 weeks to 6 months, he verbally understood and agreed.    Sallie Virk [facility]  4/23/2019 2:51 PM

## 2019-04-23 NOTE — LETTER
4/23/2019      RE: J Luis Wolf  [address] [facility] [id number]        returns for management of RA complicated by ILD. +CCP, +RF, -GIANCARLO. No history of erosions. FVC 39%/DLCO 58% . Previously failed methotrexate, azathioprine, and mycophenolate. Prednisone dependent. Gabapentin failed due to somnolence.     He reports an exacerbation of his breathing a week ago, that he relates to traveling back to [place] in the care for [place]. This is a little improved today. CT is stable and echocardiogram is planned.     No redness, warmth or swelling to report. Function of the joints seems stable. Perhaps some toe deformities have worsened. Energy is improving today from a week ago. AM stiffness is 20 minutes.     Prednisone 15 mg daily and Hydroxychlorquine 400 mg daily with yearly normal eye checks. No infection to report. Tylenol 500 mg one tablet four times daily. Alendronate 70 mg once weekly with good tolerance.    PMI:  Medical-RA with ILD, atopic rhinitis, hip bursitis and gluteal tendinopathy, osteoporosis with T4-5 vertebral fractures (T = -1.6 ), hyperlipidemia, right rotator cuff disease, DDD/DJD with foraminal stenosis.  Surgical-appendectomy, bunion surgery and toe surgical straightening, right elbow surgery  Injury-right elbow laceration, right knee injury, low back injury    SH:  Retired ,  with two children. Former smoker (5 years with occasional cigarette use), 1 EtOH per day, no illicit drug use. Silica and Right handed.    FH:  Mother lived to very old age, now dead  Father dead at 80 with heart disease and OA  Brother with celiac disease  Children and grandchildren are healthy    PMSF history personally reviewed by me today.    ROS:  +radiating back pain on the right to the distal right LE  +white flakes on his skin and think skin  +seasonal allergy  Remainder of the 14 point ROS obtained and found negative.    Physical Exam:  Constitutional:  WD-WN-WG cooperative; +continuous O2 therapy  Eyes: nl EOM, PERRLA, vision, conjunctiva, sclera  ENT: nl external ears, nose, hearing, lips, teeth, gums, throat  Neck: no mass or thyroid enlargement  Resp: +lungs with bibasilar rales; nl to palpation, nl effort  CV: +irregularly irregular rhythm, no murmurs, rubs or gallops, no edema  GI: no ABD mass or tenderness, no HSM  : not tested  Lymph: no cervical or epitrochlear nodes  MS: +heberden's nodes with diffuse angular deformities; bilateral left>right wrist reduced extension and 1st CMC squaring;  +right shoulder with only minimal 30 degrees active abduction and limited rotation; neck rotation 30 degrees right and 45 left and no extension; All other TMJ, neck, shoulder, elbow, wrist, hand, spine, hip, knee, ankle, and foot joints were examined and otherwise found normal. Normal  strength. Intact tuck. Fairly normal prayer.  Skin: no nail pitting, alopecia, rash  Neuro: nl cranial nerves, strength, sensation, no DTRs.   Psych: nl judgement, orientation, memory, affect.    Laboratory:    Component      Latest Ref Rng & Units 11/6/2018   WBC      4.0 - 11.0 10e9/L 8.1   RBC Count      4.4 - 5.9 10e12/L 4.54   Hemoglobin      13.3 - 17.7 g/dL 13.5   Hematocrit      40.0 - 53.0 % 45.4   MCV      78 - 100 fl 100   MCH      26.5 - 33.0 pg 29.7   MCHC      31.5 - 36.5 g/dL 29.7 (L)   RDW      10.0 - 15.0 % 13.7   Platelet Count      150 - 450 10e9/L 193   Diff Method       Automated Method   % Neutrophils      % 66.5   % Lymphocytes      % 19.1   % Monocytes      % 10.2   % Eosinophils      % 2.7   % Basophils      % 0.9   % Immature Granulocytes      % 0.6   Nucleated RBCs      0 /100 0   Absolute Neutrophil      1.6 - 8.3 10e9/L 5.4   Absolute Lymphocytes      0.8 - 5.3 10e9/L 1.6   Absolute Monocytes      0.0 - 1.3 10e9/L 0.8   Absolute Eosinophils      0.0 - 0.7 10e9/L 0.2   Absolute Basophils      0.0 - 0.2 10e9/L 0.1   Abs Immature Granulocytes      0 - 0.4  10e9/L 0.1   Absolute Nucleated RBC       0.0   Sodium      133 - 144 mmol/L 139   Potassium      3.4 - 5.3 mmol/L 4.3   Chloride      94 - 109 mmol/L 96   Carbon Dioxide      20 - 32 mmol/L 39 (H)   Anion Gap      3 - 14 mmol/L 4   Glucose      70 - 99 mg/dL 88   Urea Nitrogen      7 - 30 mg/dL 24   Creatinine      0.66 - 1.25 mg/dL 1.03   GFR Estimate      >60 mL/min/1.7m2 70   GFR Estimate If Black      >60 mL/min/1.7m2 84   Calcium      8.5 - 10.1 mg/dL 8.7   Bilirubin Total      0.2 - 1.3 mg/dL 0.3   Albumin      3.4 - 5.0 g/dL 3.5   Protein Total      6.8 - 8.8 g/dL 7.2   Alkaline Phosphatase      40 - 150 U/L 61   ALT      0 - 70 U/L 37   AST      0 - 45 U/L 27   Color Urine       Diann   Appearance Urine       Slightly Cloudy   Glucose Urine      NEG:Negative mg/dL Negative   Bilirubin Urine      NEG:Negative Small (A)   Ketones Urine      NEG:Negative mg/dL 5 (A)   Specific Gravity Urine      1.003 - 1.035 >1.035 (H)   Blood Urine      NEG:Negative Negative   pH Urine      5.0 - 7.0 pH 5.0   Protein Albumin Urine      NEG:Negative mg/dL 30 (A)   Urobilinogen mg/dL      0.0 - 2.0 mg/dL 4.0 (H)   Nitrite Urine      NEG:Negative Negative   Leukocyte Esterase Urine      NEG:Negative Trace (A)   Source       Midstream Urine   WBC Urine      0 - 5 /HPF 1   RBC Urine      0 - 2 /HPF 1   Mucous Urine      NEG:Negative /LPF Present (A)   Hyaline Casts      0 - 2 /LPF 9 (H)   Sed Rate      0 - 20 mm/h 18   CRP Inflammation      0.0 - 8.0 mg/L 6.8     Impression:    Seropositive Rheumatoid Arthritis-with no signs of active systemic inflammation or synovitis today. He continues to tolerate the hydroxychloroquine well and we will continue this.     Osteoarthritis-stable disease.    Radiculopathy-with right distal leg pain.    Osteoporosis-on fosamax 70 mg weekly dose. With stable DEXA testing and no recent fracture.    Hepatitis-I will recheck his LFTs today.    Long term management of immunosuppression-lab testing is  pending today. We agree to a 2 week trial off of the hydroxychloroquine medication to determine whether this agent is contributing to chronic weight loss. He agrees to this plan. Continue routine eye checks. Shingrix immunization today and on RTC.    Plan RTC in 6 months.      Edu Roberts MD

## 2019-04-23 NOTE — LETTER
4/23/2019       RE: J Luis Wolf  [address] [facility] [address]     Dear Colleague,    Thank you for referring your patient, J Luis Wolf, to the [facility] RHEUMATOLOGY at [facility]. Please see a copy of my visit note below.     returns for management of RA complicated by ILD. +CCP, +RF, -GIANCARLO. No history of erosions. FVC 39%/DLCO 58% . Previously failed methotrexate, azathioprine, and mycophenolate. Prednisone dependent. Gabapentin failed due to somnolence.     He reports an exacerbation of his breathing a week ago, that he relates to traveling back to [place] in the care for [place]. This is a little improved today. CT is stable and echocardiogram is planned.     No redness, warmth or swelling to report. Function of the joints seems stable. Perhaps some toe deformities have worsened. Energy is improving today from a week ago. AM stiffness is 20 minutes.     Prednisone 15 mg daily and Hydroxychlorquine 400 mg daily with yearly normal eye checks. No infection to report. Tylenol 500 mg one tablet four times daily. Alendronate 70 mg once weekly with good tolerance.    PMI:  Medical-RA with ILD, atopic rhinitis, hip bursitis and gluteal tendinopathy, osteoporosis with T4-5 vertebral fractures (T = -1.6 ), hyperlipidemia, right rotator cuff disease, DDD/DJD with foraminal stenosis.  Surgical-appendectomy, bunion surgery and toe surgical straightening, right elbow surgery  Injury-right elbow laceration, right knee injury, low back injury    SH:  Retired ,  with two children. Former smoker (5 years with occasional cigarette use), 1 EtOH per day, no illicit drug use. Silica and Right handed.    FH:  Mother lived to very old age, now dead  Father dead at 80 with heart disease and OA  Brother with celiac disease  Children and grandchildren are healthy    PMSF history personally reviewed by me today.    ROS:  +radiating back  pain on the right to the distal right LE  +white flakes on his skin and think skin  +seasonal allergy  Remainder of the 14 point ROS obtained and found negative.    Physical Exam:  Constitutional: WD-WN-WG cooperative; +continuous O2 therapy  Eyes: nl EOM, PERRLA, vision, conjunctiva, sclera  ENT: nl external ears, nose, hearing, lips, teeth, gums, throat  Neck: no mass or thyroid enlargement  Resp: +lungs with bibasilar rales; nl to palpation, nl effort  CV: +irregularly irregular rhythm, no murmurs, rubs or gallops, no edema  GI: no ABD mass or tenderness, no HSM  : not tested  Lymph: no cervical or epitrochlear nodes  MS: +heberden's nodes with diffuse angular deformities; bilateral left>right wrist reduced extension and 1st CMC squaring;  +right shoulder with only minimal 30 degrees active abduction and limited rotation; neck rotation 30 degrees right and 45 left and no extension; All other TMJ, neck, shoulder, elbow, wrist, hand, spine, hip, knee, ankle, and foot joints were examined and otherwise found normal. Normal  strength. Intact tuck. Fairly normal prayer.  Skin: no nail pitting, alopecia, rash  Neuro: nl cranial nerves, strength, sensation, no DTRs.   Psych: nl judgement, orientation, memory, affect.    Laboratory:    Component      Latest Ref Rng & Units 11/6/2018   WBC      4.0 - 11.0 10e9/L 8.1   RBC Count      4.4 - 5.9 10e12/L 4.54   Hemoglobin      13.3 - 17.7 g/dL 13.5   Hematocrit      40.0 - 53.0 % 45.4   MCV      78 - 100 fl 100   MCH      26.5 - 33.0 pg 29.7   MCHC      31.5 - 36.5 g/dL 29.7 (L)   RDW      10.0 - 15.0 % 13.7   Platelet Count      150 - 450 10e9/L 193   Diff Method       Automated Method   % Neutrophils      % 66.5   % Lymphocytes      % 19.1   % Monocytes      % 10.2   % Eosinophils      % 2.7   % Basophils      % 0.9   % Immature Granulocytes      % 0.6   Nucleated RBCs      0 /100 0   Absolute Neutrophil      1.6 - 8.3 10e9/L 5.4   Absolute Lymphocytes      0.8 - 5.3  10e9/L 1.6   Absolute Monocytes      0.0 - 1.3 10e9/L 0.8   Absolute Eosinophils      0.0 - 0.7 10e9/L 0.2   Absolute Basophils      0.0 - 0.2 10e9/L 0.1   Abs Immature Granulocytes      0 - 0.4 10e9/L 0.1   Absolute Nucleated RBC       0.0   Sodium      133 - 144 mmol/L 139   Potassium      3.4 - 5.3 mmol/L 4.3   Chloride      94 - 109 mmol/L 96   Carbon Dioxide      20 - 32 mmol/L 39 (H)   Anion Gap      3 - 14 mmol/L 4   Glucose      70 - 99 mg/dL 88   Urea Nitrogen      7 - 30 mg/dL 24   Creatinine      0.66 - 1.25 mg/dL 1.03   GFR Estimate      >60 mL/min/1.7m2 70   GFR Estimate If Black      >60 mL/min/1.7m2 84   Calcium      8.5 - 10.1 mg/dL 8.7   Bilirubin Total      0.2 - 1.3 mg/dL 0.3   Albumin      3.4 - 5.0 g/dL 3.5   Protein Total      6.8 - 8.8 g/dL 7.2   Alkaline Phosphatase      40 - 150 U/L 61   ALT      0 - 70 U/L 37   AST      0 - 45 U/L 27   Color Urine       Diann   Appearance Urine       Slightly Cloudy   Glucose Urine      NEG:Negative mg/dL Negative   Bilirubin Urine      NEG:Negative Small (A)   Ketones Urine      NEG:Negative mg/dL 5 (A)   Specific Gravity Urine      1.003 - 1.035 >1.035 (H)   Blood Urine      NEG:Negative Negative   pH Urine      5.0 - 7.0 pH 5.0   Protein Albumin Urine      NEG:Negative mg/dL 30 (A)   Urobilinogen mg/dL      0.0 - 2.0 mg/dL 4.0 (H)   Nitrite Urine      NEG:Negative Negative   Leukocyte Esterase Urine      NEG:Negative Trace (A)   Source       Midstream Urine   WBC Urine      0 - 5 /HPF 1   RBC Urine      0 - 2 /HPF 1   Mucous Urine      NEG:Negative /LPF Present (A)   Hyaline Casts      0 - 2 /LPF 9 (H)   Sed Rate      0 - 20 mm/h 18   CRP Inflammation      0.0 - 8.0 mg/L 6.8     Impression:    Seropositive Rheumatoid Arthritis-with no signs of active systemic inflammation or synovitis today. He continues to tolerate the hydroxychloroquine well and we will continue this.     Osteoarthritis-stable disease.    Radiculopathy-with right distal leg  pain.    Osteoporosis-on fosamax 70 mg weekly dose. With stable DEXA testing and no recent fracture.    Hepatitis-I will recheck his LFTs today.    Long term management of immunosuppression-lab testing is pending today. We agree to a 2 week trial off of the hydroxychloroquine medication to determine whether this agent is contributing to chronic weight loss. He agrees to this plan. Continue routine eye checks. Shingrix immunization today and on RTC.    Plan RTC in 6 months.      Again, thank you for allowing me to participate in the care of your patient.      Sincerely,    Edu Roberts MD

## 2019-07-22 PROBLEM — J96.01 ACUTE RESPIRATORY FAILURE WITH HYPOXIA AND HYPERCARBIA (H): Status: ACTIVE | Noted: 2019-01-01

## 2019-07-22 PROBLEM — J96.02 ACUTE RESPIRATORY FAILURE WITH HYPOXIA AND HYPERCARBIA (H): Status: ACTIVE | Noted: 2019-01-01

## 2019-07-22 NOTE — ED NOTES
"[facility]  ED Nurse Handoff Report    J Luis Wolf is a 79 year old male   ED Chief complaint: Shortness of Breath  . ED Diagnosis:   Final diagnoses:   Shortness of breath   Troponin level elevated   Chronic respiratory failure with hypoxia and hypercapnia (H)     Allergies:   Allergies   Allergen Reactions     Flu Virus Vaccine      Swollen face     Pistachios [Nuts] Swelling and Cough     Not tested medically       Code Status: Full Code  Activity level - Baseline/Home:  Independent. Activity Level - Current:   Assist X 1. Lift room needed: No. Bariatric: No   Needed: No   Isolation: No. Infection: Not Applicable.     Vital Signs:   Vitals:    07/22/19 0915 07/22/19 0930 07/22/19 0945 07/22/19 0957   BP: 115/83 114/77 114/76    Pulse: 93 93 92    Resp:       Temp:       TempSrc:       SpO2: 100% 100% 100% 100%       Cardiac Rhythm:  ,      Pain level:    Patient confused: No. Patient Falls Risk: Yes.   Elimination Status: Has voided   Patient Report - Initial Complaint: SOB. Focused Assessment:    08:45 ED Triage Notes Filed Arrives with shortness of breath that has worsened over the past 2 days, denies pain but endorses \"breathing discomfort\" in his chest, denies cough. Alert and oriented, ABCs intact.     10:11 Respiratory Respiratory - Respiratory WDL: -WDL except; breath sounds (extensive lung hx. 2 LPM baseline. Increased O2 needs, SOB, and fatigue since Saturday. Denies fever, CP, HA, ) Breath Sounds: All Fields   Head To Toe Assessment - All Lung Fields Breath Sounds: diminished          Tests Performed: labs, imaging. Abnormal Results:   Labs Ordered and Resulted from Time of ED Arrival Up to the Time of Departure from the ED   CBC WITH PLATELETS DIFFERENTIAL - Abnormal; Notable for the following components:       Result Value    RBC Count 4.21 (*)     Hemoglobin 12.9 (*)      (*)     MCHC 29.5 (*)     All other components within normal limits   BASIC METABOLIC PANEL - " Abnormal; Notable for the following components:    Chloride 93 (*)     Carbon Dioxide 45 (*)     Anion Gap 2 (*)     All other components within normal limits   D DIMER QUANTITATIVE - Abnormal; Notable for the following components:    D Dimer 2.5 (*)     All other components within normal limits   TROPONIN I - Abnormal; Notable for the following components:    Troponin I ES 0.048 (*)     All other components within normal limits   BLOOD GAS VENOUS AND OXYHGB - Abnormal; Notable for the following components:    PCO2 Venous 86 (*)     PO2 Venous 23 (*)     Bicarbonate Venous 50 (*)     All other components within normal limits   CK TOTAL   NT PROBNP INPATIENT   CARDIAC CONTINUOUS MONITORING     CT Chest Pulmonary Embolism w Contrast   Final Result   IMPRESSION:   1. No evidence of pulmonary embolism.   2. Severe interstitial lung disease with fibrosis and peripheral   honeycombing.   3. Coronary artery calcifications.      CARLOS MANUEL RIVERA MD        .   Treatments provided: monitoring, ASA, fluids  Family Comments: spouse at bedside  OBS brochure/video discussed/provided to patient:  No  ED Medications:   Medications   0.9% sodium chloride BOLUS (0 mLs Intravenous Stopped 7/22/19 1135)   aspirin (ASA) chewable tablet 324 mg (324 mg Oral Given 7/22/19 1002)   0.9% sodium chloride BOLUS (0 mLs Intravenous Stopped 7/22/19 1104)   iopamidol (ISOVUE-370) solution 100 mL (58 mLs Intravenous Given 7/22/19 1102)     Drips infusing:  No  For the majority of the shift, the patient's behavior Green. Interventions performed were n/a.     Severe Sepsis OR Septic Shock Diagnosis Present: No      ED Nurse Name/Phone Number: Carey Martinez,   11:35 AM    RECEIVING UNIT ED HANDOFF REVIEW    Above ED Nurse Handoff Report was reviewed: Yes  Reviewed by: Violet Ward on July 22, 2019 at 2:43 PM

## 2019-07-22 NOTE — H&P
[facility]    Hospitalist History and Physical    Name: J Luis Wolf    MRN: [id number]  YOB: 1940    Age: 79 year old  Date of Admission:  7/22/2019  Date of Service (when I saw the patient): 07/22/19    Assessment & Plan   J Luis Wolf is a 79 year old male with PMH significant for RA, interstitial lung disease on chronic supplemental oxygen, HTN, and recently admitted from 6/4-6/7/19 at [facility] [place] for left central retinal artery occlusion s/p hyperbaric oxygen therapy (only completed 6 of 10 treatments due to significant claustrophobia and minimal benefit) and subsequently diagnosed with left occipital lobe stroke for which he is now on Plavix and ASA for 3 months to follow with ASA monotherapy after this who presents to the ED on 7/22/19 for evaluation of worsening shortness of breath. ED workup reveals stable oxygen saturations on 4 L via NC, chloride of 93, carbon dioxide of 45, CK of 72, proBNP of 328, troponin of 0.048, glucose of 88, VBG shows pH of 7.38, PCO2 of 86, PO2 of 23, bicarbonate of 15, oxyhemoglobin of 38, hemoglobin 12.9, d-dimer of 2.5, CT of chest shows no evidence of PE, severe interstitial lung disease with fibrosis and peripheral honeycombing, and coronary artery calcifications, and EKG shows rate of 94 bpm in sinus rhythm with incomplete RBBB, minimal voltage LVH.     #Acute on chronic hypoxia and hypercarbia respiratory failure 2/2 interstitial lung disease: worsening exertional shortness of breath over the past three days with his oxygen saturations between 89-94%, which is lower than usual for him when using 4 L on his home supplemental oxygen. At baseline patient uses 2-3 L supplemental oxygen at rest and increases to 4 L when active. Follows with Dr. Millan of pulmonology at [facility] with his PFTs stable at his most recent f/u in 04/2019 with plans to be seen again in 08/2019. VBG today looks worse than the one recently obtained while the patient was admitted at  [facility] [place] [place] in 06/2019 (in care everywhere). Suspect acute worsening 2/2 deconditioning from recent hospital stay, possible malfunction of his supplemental oxygen tank at home (wife is currently having a technician check this), and changes in humidity. Patient is chronically on Prednisone 15 mg daily, will hold this while administering IV steroids.     - Continuous pulse oximetry  - IV Solumedrol 40 mg every 8 hours   - Supplemental oxygen, wean as tolerated down to baseline of 2-3 L at rest and up to 4 L with activity   - PRN Duo nebs every 4 hours     #Elevated troponin: likely demand ischemia, no associated chest pain, family history of MI in father but no personal h/o CAD, mild troponin leak during hospitalization at [facility] [place] [place]. Initial troponin today of 0.048 and EKG unremarkable for ischemic changes. If troponins remain flat could consider stress testing or defer to outpatient.    - Monitor on telemetry  - Serial troponins     #Generalized weakness, muscle aches: likely multifactorial and related to recent hospitalization causing decreased exercise tolerance, known RA, and acute increase in statin due to recent stroke which has been causing myalgias in bilateral calves. Patient stopped taking his Atorvastatin on his own this past Friday.   - Hold Atorvastatin, once muscle aches improve consider restarting at lower dose or trial of different statin to see if he tolerates this better  - PT consult    #Recent left CRAO, occipital CVA: admitted from 6/4-6/7/19 at [facility] [place] [place] for left central retinal artery occlusion s/p hyperbaric oxygen therapy (only completed 6 of 10 treatments due to significant claustrophobia and minimal benefit) and subsequently diagnosed with left occipital lobe stroke found on MRI for which he is now on Plavix and ASA for 3 months to follow with ASA monotherapy. Residual left eye central vision loss. Suppose to be seen in f/u by neurology today but missed this appointment due to coming into the hospital.   -  Continue PTA Plavix and ASA  - Reschedule follow up with neurology upon discharge     #RA: follows with Dr. Roberts of rheumatology for management.   - Continue PTA Plaquenil     #HTN: stable, continue PTA Losartan     DVT Prophylaxis: Pneumatic Compression Devices  Code Status: DNR / DNI, discussed with patient and wife at bedside   Disposition: Expected stay >2 midnights, will admit to inpatient     Primary Care Physician   Mario Foster    Chief Complaint   Shortness of breath     History obtained from discussion with ED provider, chart review, and interview with patient.     History of Present Illness   J Luis Wolf is a 79 year old male who presents with increased shortness of breath over the last 3 days.  The patient states that he had onset of shortness of breath Friday afternoon that progressively worsened throughout the weekend.  The patient increased his home oxygen up to 4 L, which is its max, without improvement in his shortness of breath.  He states that his oxygen saturations were also lower than normal at 89 to 94% even with increasing his oxygen.  Patient and his wife are concerned that his machine was not working at home and even though it was reading delivering 4 L he did not feel that it was.  The patient states that his shortness of breath has occurred both at rest as well with increased activity.  He states that he has had some mild increased shortness of breath since [place] [facility] [place]  He also notes he has had a decrease in his exercise tolerance since then due to inactivity while he was hospitalized.  The patient states he has been having bilateral myalgias in his legs since starting an increased dose of atorvastatin and due to this he stopped taking this as of Friday.  He notes feeling unsteady on his feet.  He notes decreased appetite and feeling full quickly with eating, denies associated dysphasia.  Denies any recent cough, congestion, wheezing, chest  pain, nausea, vomiting, lightheadedness, or dizziness.  The patient denies any recent exacerbation of his known interstitial lung disease or needing increased oral steroids.  The patient states that prior to his hospitalization in June he would attempt to exercise a couple times a week at the wellness center and has previously undergone pulmonary rehab. [place]  [place] and [place] wife attempted to walk when he was able, but this was less than usual.  The patient closely follows with Dr. Millan of pulmonology at the [facility] for management of his ILD.  Patient reports his father had a history of multiple MIs, but he denies any personal history of CAD.    Past Medical History    Past Medical History:   Diagnosis Date     ILD (interstitial lung disease) (H)     associated with rheumatoid arthritis, present on CT since 2013 and also has air trapping on CT, methotrexate stopped 7/2013, started MMF 12/2014 but stopped 2015 due to 20 lb weight loss and anorexia.  Did not tolerate imuran due to chills/fevers.     Rheumatoid arthritis (H)     RA dx 2012, + CCP and RF.     Past Surgical History   Past Surgical History:   Procedure Laterality Date     APPENDECTOMY  1956     BACK SURGERY  2014    Compression fx thoracic vertebra     COLONOSCOPY  2014     ORTHOPEDIC SURGERY  2015    Bunionectomy lt foot     PERCUTANEOUS BIOPSY LIVER N/A 5/10/2018    Procedure: PERCUTANEOUS BIOPSY LIVER;  Percutaneous Liver Biopsy;  Surgeon: Raymon Gann PA-C;  Location: [facility] OR     SOFT TISSUE SURGERY  2015    Bursitis rt hip cortisone injection 11 15 2015     THORACIC SURGERY  2012    Pulmonary fibrosis     Prior to Admission Medications   Prior to Admission Medications   Prescriptions Last Dose Informant Patient Reported? Taking?   Calcium Citrate-Vitamin D ( CALCIUM CITRATE+/VIT D3 PO) 7/21/2019 at Unknown time  Yes Yes   Sig: Take by mouth 2  times daily 630/500iu   Cholecalciferol (D3-1000 PO) 7/21/2019 at Unknown time  Yes Yes   Sig: Take by mouth daily   Dextromethorphan-Guaifenesin (MUCINEX DM PO) Past Week at Unknown time  Yes Yes   Sig: Take by mouth as needed    Multiple Vitamin (DAILY MULTIVITAMIN PO) 7/21/2019 at Unknown time  Yes Yes   Sig: Take 2 tablets by mouth every morning   acetaminophen (TYLENOL) 500 MG tablet 7/22/2019 at AM  Yes Yes   Sig: Take 500 mg by mouth every 6 hours as needed for mild pain   alendronate (FOSAMAX) 70 MG tablet 7/16/2019  No No   Sig: Take 1 tablet (70 mg) by mouth every 7 days   aspirin 81 MG EC tablet 7/21/2019 at Unknown time  Yes Yes   Sig: Take 81 mg by mouth daily   atorvastatin (LIPITOR) 40 MG tablet 7/17/2019  Yes Yes   Sig: Take 40 mg by mouth daily   azelastine (ASTELIN) 137 MCG/SPRAY nasal spray   Yes No   Sig: Spray 1 spray into both nostrils daily as needed    benzonatate (TESSALON) 100 MG capsule 7/21/2019 at Unknown time  No Yes   Sig: Take 1 capsule (100 mg) by mouth 3 times daily as needed for cough   cetirizine (ZYRTEC) 10 MG tablet 7/21/2019 at Unknown time  Yes Yes   Sig: Take 10 mg by mouth daily   clopidogrel (PLAVIX) 75 MG tablet 7/21/2019 at 0900  Yes Yes   Sig: Take 75 mg by mouth daily   hydroxychloroquine (PLAQUENIL) 200 MG tablet 7/21/2019 at Unknown time  Yes Yes   Sig: Take 200 mg by mouth daily   losartan (COZAAR) 25 MG tablet 7/21/2019 at Unknown time  Yes Yes   Sig: Take 25 mg by mouth daily    pantoprazole (PROTONIX) 40 MG EC tablet 7/21/2019 at Unknown time  Yes Yes   Sig: Take 40 mg by mouth every evening   predniSONE (DELTASONE) 10 MG tablet 7/21/2019 at Unknown time  No Yes   Sig: Take 10 mg by mouth daily Take one 5 mg and one 10 mg tab daily for a total of 15 mg daily..   predniSONE (DELTASONE) 5 MG tablet 7/21/2019 at Unknown time  No Yes   Sig: Take one 5 mg and one 10 mg tab daily for a total of 15 mg daily..   sulfamethoxazole-trimethoprim (BACTRIM DS/SEPTRA DS) 800-160  MG tablet 2019  No No   Sig: Take 1 tablet by mouth Every Mon, Wed, Fri Morning 400-80      Facility-Administered Medications: None     Allergies   Allergies   Allergen Reactions     Flu Virus Vaccine      Swollen face     Pistachios [Nuts] Swelling and Cough     Not tested medically     Social History   Social History     Tobacco Use     Smoking status: Former Smoker     Packs/day: 0.50     Years: 5.00     Pack years: 2.50     Types: Cigarettes     Start date: 1957     Last attempt to quit: 1962     Years since quittin.5     Smokeless tobacco: Never Used   Substance Use Topics     Alcohol use: Yes     Alcohol/week: 1.8 oz     Types: 3 Standard drinks or equivalent per week     Social History     Social History Narrative    , 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     Family History   Family history reviewed with patient and significant for father with multiple MIs.     Review of Systems   A Comprehensive greater than 10 system review of systems was carried out.  Pertinent positives and negatives are noted above.  Otherwise negative for contributory information.    Physical Exam   Temp: 97.9  F (36.6  C) Temp src: Oral BP: 123/84 Pulse: 90 Heart Rate: 94 Resp: 24 SpO2: 95 % O2 Device: Nasal cannula Oxygen Delivery: 4 LPM  Vital Signs with Ranges  Temp:  [97.9  F (36.6  C)] 97.9  F (36.6  C)  Pulse:  [] 90  Heart Rate:  [] 94  Resp:  [24] 24  BP: (114-126)/() 123/84  SpO2:  [95 %-100 %] 95 %  0 lbs 0 oz  GEN:  Alert, oriented x 3, appears comfortable, no overt distress  HEENT:  Normocephalic/atraumatic, no scleral icterus, no nasal discharge, mouth moist.  CV:  Regular rate and rhythm, no murmur or JVD.  S1 + S2 noted, no S3 or S4.  LUNGS: Decreased breath sounds throughout, no wheezing or rales. Symmetric chest rise on inhalation noted.  ABD:  Active bowel sounds, soft, non-tender/non-distended.  No  rebound/guarding/rigidity.  EXT:  No edema.  No cyanosis.  No acute joint synovitis noted.  SKIN:  Dry to touch, no exanthems noted in the visualized areas.  NEURO:  Symmetric muscle strength, sensation to touch grossly intact. Coordination symmetric on general exam.  No new focal deficits appreciated.    Data   Data reviewed today:  I personally reviewed the EKG tracing showing rate of 94 bpm in sinus rhythm with incomplete RBBB, minimal voltage LVH.    Results for orders placed or performed during the hospital encounter of 07/22/19   CT Chest Pulmonary Embolism w Contrast    Narrative    CT CHEST PULMONARY EMBOLISM WITH CONTRAST   7/22/2019 11:10 AM     HISTORY: PE suspected, intermediate probability, positive D-dimer.  Chest pain.    TECHNIQUE:  58 mL Isovue-370. Radiation dose for this scan was reduced  using automated exposure control, adjustment of the mA and/or kV  according to patient size, or iterative reconstruction technique.    COMPARISON: 4/19/2019    FINDINGS:  There is no evidence of pulmonary embolism or acute  thoracic aortic abnormality. There is moderate coronary  atherosclerosis.    The lungs are fibrotic with honeycombing, similar to prior. No new  airspace consolidation or mass demonstrated. There is nonaneurysmal  aortic atherosclerosis. No enlarged thoracic or axillary lymph nodes.  No pleural or pericardial effusion. No pneumothorax. The heart is  enlarged. There is a compression deformity of T5 and the inferior  endplate of T4, similar to prior.      Impression    IMPRESSION:  1. No evidence of pulmonary embolism.  2. Severe interstitial lung disease with fibrosis and peripheral  honeycombing.  3. Coronary artery calcifications.    CARLOS MANUEL RIVERA MD   CBC with platelets differential   Result Value Ref Range    WBC 8.4 4.0 - 11.0 10e9/L    RBC Count 4.21 (L) 4.4 - 5.9 10e12/L    Hemoglobin 12.9 (L) 13.3 - 17.7 g/dL    Hematocrit 43.7 40.0 - 53.0 %     (H) 78 - 100 fl    MCH 30.6  26.5 - 33.0 pg    MCHC 29.5 (L) 31.5 - 36.5 g/dL    RDW 13.5 10.0 - 15.0 %    Platelet Count 167 150 - 450 10e9/L    Diff Method Automated Method     % Neutrophils 70.5 %    % Lymphocytes 16.7 %    % Monocytes 9.8 %    % Eosinophils 1.9 %    % Basophils 0.6 %    % Immature Granulocytes 0.5 %    Nucleated RBCs 0 0 /100    Absolute Neutrophil 5.9 1.6 - 8.3 10e9/L    Absolute Lymphocytes 1.4 0.8 - 5.3 10e9/L    Absolute Monocytes 0.8 0.0 - 1.3 10e9/L    Absolute Eosinophils 0.2 0.0 - 0.7 10e9/L    Absolute Basophils 0.1 0.0 - 0.2 10e9/L    Abs Immature Granulocytes 0.0 0 - 0.4 10e9/L    Absolute Nucleated RBC 0.0    Basic metabolic panel   Result Value Ref Range    Sodium 140 133 - 144 mmol/L    Potassium 3.9 3.4 - 5.3 mmol/L    Chloride 93 (L) 94 - 109 mmol/L    Carbon Dioxide 45 (H) 20 - 32 mmol/L    Anion Gap 2 (L) 3 - 14 mmol/L    Glucose 88 70 - 99 mg/dL    Urea Nitrogen 15 7 - 30 mg/dL    Creatinine 0.70 0.66 - 1.25 mg/dL    GFR Estimate 89 >60 mL/min/[1.73_m2]    GFR Estimate If Black >90 >60 mL/min/[1.73_m2]    Calcium 10.1 8.5 - 10.1 mg/dL   CK total   Result Value Ref Range    CK Total 72 30 - 300 U/L   D dimer quantitative   Result Value Ref Range    D Dimer 2.5 (H) 0.0 - 0.50 ug/ml FEU   Nt probnp inpatient   Result Value Ref Range    N-Terminal Pro BNP Inpatient 328 0 - 1,800 pg/mL   Troponin I   Result Value Ref Range    Troponin I ES 0.048 (H) 0.000 - 0.045 ug/L   Blood gas venous and oxyhgb   Result Value Ref Range    Ph Venous 7.38 7.32 - 7.43 pH    PCO2 Venous 86 (HH) 40 - 50 mm Hg    PO2 Venous 23 (L) 25 - 47 mm Hg    Bicarbonate Venous 50 (HH) 21 - 28 mmol/L    FIO2 3     Oxyhemoglobin Venous 38 %    Base Excess Venous 19.8 mmol/L   EKG 12 lead   Result Value Ref Range    Interpretation ECG Click View Image link to view waveform and result      Mandi Solano PA-C    I discussed the patient with Dr. Sal and he agrees with the above plan.

## 2019-07-22 NOTE — ED PROVIDER NOTES
History     Chief Complaint:  Shortness of Breath    The history is provided by the patient.      J Luis Wolf is a 79 year old male with a history of recent left occipital stroke and CRAO, RA, interstitial lung disease, and HTN who presents with shortness of breath. From 6/4-6/7, the patient was admitted to [facility] [place] for sudden painless vision loss in his left eye. The patient was diagnosed with a left CRAO which was treated with hyperbaric oxygen therapy. Today, the patient reports that last night, he had shortness of breath while resting at home. He notes he has never been this short of breath. The patient notes he is always on 2-3 L of oxygen when resting and 4 L when exercising for the past year. However, he states that since his most recent stay in the hospital his shortness of breath became worse and he has needed increasing levels of supplemental O2 per n/c.  The patient reports also having increased generalized weakness in his extremities and lack of appetite since his return home from the hospital. The patient denies chest pain, fever, cough, wheezing, past blood clots, or leg swelling. Of note, the patient states that he was started on an increased Lipitor dosage, Plavix, Asprin, and Protonix after his hospital visit. However, the patient had intolerable leg pain on 7/19 which caused him to stop taking his Lipitor. The patient sees Dr. Millan from the [facility] for Pulmonology.      Allergies:  Flu virus vaccine  Pistachios     Medications:    Lipitor  Astelin  Zyrtec  Cozaar  Ecotrin  Plavix  Protonix  Asprin    Past Medical History:    Interstitial lung disease  Rheumatoid arthritis  Atopic rhinitis  Bursitis of hip  Interstitial lung disease  Osteopenia  HLD  Colonic polyps  Gluteal tendinitis  Osteoporosis    Past Surgical History:    Appendectomy  Back surgery  Colonoscopy  Orthopedic surgery  Percutaneous biopsy liver  Soft tissue surgery  Thoracic surgery    Family History:    Coronary artery  disease  HLD   HTN    Social History:  Presents with wife  Marital Status:    Former smoker: 2.5 pack years  Alcohol user: 1.8 oz/week    Review of Systems   Constitutional: Positive for appetite change. Negative for fever.   Respiratory: Positive for shortness of breath. Negative for cough and wheezing.    Cardiovascular: Negative for chest pain and leg swelling.   Musculoskeletal:        Muscle weakness   All other systems reviewed and are negative.    Physical Exam     Patient Vitals for the past 24 hrs:   BP Temp Temp src Pulse Heart Rate Resp SpO2   07/22/19 1245 -- -- -- 90 94 -- --   07/22/19 1230 123/84 -- -- 103 99 -- 95 %   07/22/19 1215 121/79 -- -- 88 88 -- 99 %   07/22/19 1200 116/78 -- -- 87 92 -- 99 %   07/22/19 1145 -- -- -- 85 100 -- 98 %   07/22/19 0957 -- -- -- -- 95 -- 100 %   07/22/19 0945 114/76 -- -- 92 93 -- 100 %   07/22/19 0930 114/77 -- -- 93 93 -- 100 %   07/22/19 0915 115/83 -- -- 93 94 -- 100 %   07/22/19 0900 126/87 -- -- 98 94 -- 100 %   07/22/19 0846 (!) 117/105 97.9  F (36.6  C) Oral 100 -- 24 100 %       Physical Exam  VS: Reviewed per above  HENT: Mucous membranes moist  EYES: sclera anicteric  CV: Rate as noted,  regular rhythm.   RESP: Effort normal. Breath sounds are normal bilaterally.  NEURO: Alert, moving all extremities with 5 out of 5 strength  MSK: No deformity of the extremities, no calf asymmetry or swelling.  SKIN: Warm and dry    Emergency Department Course   ECG:  Time: 0851  Vent. Rate 94 bpm. MO interval 114. QRS duration 98. QT/QTc 354/442. P-R-T axis * -17 -11.  Sinus rhythm with premature atrial complexes  Incomplete right bundle branch block  Minimal voltage criteria for LVH, may be norm al variant  Borderline ECG  No significant change compared to written interpretation on 6/14/19, RBBB is not new  RBBB is not new  Read time: 0852    Imaging:  Radiographic findings were communicated with the patient who voiced understanding of the findings.    CT Chest  Pulmonary Embolism w Contrast:  1. No evidence of pulmonary embolism.  2. Severe interstitial lung disease with fibrosis and peripheral  honeycombing.  3. Coronary artery calcifications. As per radiology.    Laboratory:  BMP: Glucose 88, Chloride: 93(L), Carbon dioxide: 45(H), Anion gap: 2(L), o/w WNL (Creatinine: 0.70)  CBC: WBC: 8.4, HGB: 12.9(L), PLT: 167  Troponin I: (0858) 0.048(H)  N-terminal pro BNP: 328  CK total: 72  D-dimer: 2.5(H)  blood gas and oxyhgb: pH: 7.38, PCO2: 86(HH), PO2: 23(L), Bicarbonate: 50(HH), FIO2: 3, Oxyhgb: 38, Base excess venous: 19.8    Interventions:  1002 NS BOLUS 1L IV  1002  mg Oral    Emergency Department Course:  Nursing notes and vitals reviewed.    0857 I examined the patient as noted above.    0908 IV was inserted and blood was drawn for laboratory testing, results above.    1053 The patient was sent for a CT Chest while in the emergency department, results above.     1126 I rechecked the patient.    1145 I rechecked the patient.    1212 I spoke with Mandi Solano of the hospitalist service from [facility] regarding patient's presentation, findings, and plan of care.    1214 Findings and plan explained to the Patient who consents to admission. Discussed the patient with Mandi Solano, who will admit the patient to a bed for further monitoring, evaluation, and treatment.    Impression & Plan      Medical Decision Making:  Patient presents to the ER for evaluation of worsening shortness of breath from baseline.  Initial vital signs notable for SPO2 100% on 3 L O2 per nasal cannula.  Exam reveals clear lung sounds bilaterally.  EKG without specific signs of ischemia although initial troponin was elevated at 0.048.  This is similar to troponin elevation noted in June of this year when he was evaluated at [facility] for stroke and CRAO.  VBG revealed a chronic appearing respiratory acidosis with metabolic compensation.  Patient has no chest pain to  suggest obvious NSTEMI and need for heparin gtt but he was given aspirin while undergoing ACS eval.  Patient's d-dimer was elevated which prompted CT pulmonary angiogram.  This study revealed no evidence of PE. Pt remained stable in the ER prior to admission for further ACS eval.      Diagnosis:    ICD-10-CM    1. Shortness of breath R06.02    2. Troponin level elevated R74.8    3. Chronic respiratory failure with hypoxia and hypercapnia (H) J96.11     J96.12        Disposition:  Admitted to Nimisha Bryson am serving as a scribe on 7/22/2019 at 1:17 PM to personally document services performed by Florencio Garzon MD based on my observations and the provider's statements to me.     Lizzy POLLOCK am serving as a scribe at 1:25 PM on 7/22/2019 to document services personally performed by Florencio Garzon MD based on my observations and the provider's statements to me.  [facility] EMERGENCY DEPARTMENT       Florencio Garzon MD  07/22/19 0241

## 2019-07-22 NOTE — PHARMACY-ADMISSION MEDICATION HISTORY
Admission medication history interview status for this patient is complete. See [facility] admission navigator for allergy information, prior to admission medications and immunization status.     Medication history interview source(s):Patient and Family  Medication history resources (including written lists, pill bottles, clinic record): Written list from Patient's Wife  [facility] [place]    Changes made to PTA medication list:  Added: Aspirin, Clopidogrel, Pantoprazole  Deleted: None  Changed: Acetaminophen, Atorvastatin, Hydroxychloroquine, Losartan    Actions taken by pharmacist (provider contacted, etc):None     Additional medication history information:  Patient has not taken his atorvastatin for 3-4 days. He developed leg pain/weakness, so he chose to hold the dose. Leg pain/weakness has been improving since holding the medication. Encouraged patient to contact his primary care provider and discuss his statin therapy.     Medication reconciliation/reorder completed by provider prior to medication history? No    Do you take OTC medications (eg tylenol, ibuprofen, fish oil, eye/ear drops, etc)? Yes (Y/N)    For patients on insulin therapy: No (Y/N)    Prior to Admission medications    Medication Sig Last Dose Taking? Auth Provider   acetaminophen (TYLENOL) 500 MG tablet Take 500 mg by mouth every 6 hours as needed for mild pain 7/22/2019 at AM Yes Unknown, Entered By History   aspirin 81 MG EC tablet Take 81 mg by mouth daily 7/21/2019 at Unknown time Yes Unknown, Entered By History   atorvastatin (LIPITOR) 40 MG tablet Take 40 mg by mouth daily 7/17/2019 Yes Unknown, Entered By History   benzonatate (TESSALON) 100 MG capsule Take 1 capsule (100 mg) by mouth 3 times daily as needed for cough 7/21/2019 at Unknown time Yes Nickie Millan MD   Calcium Citrate-Vitamin D ( CALCIUM CITRATE+/VIT D3 PO) Take by mouth 2 times daily 630/500iu 7/21/2019 at Unknown time Yes Reported, Patient   cetirizine  (ZYRTEC) 10 MG tablet Take 10 mg by mouth daily 7/21/2019 at Unknown time Yes Reported, Patient   Cholecalciferol (D3-1000 PO) Take by mouth daily 7/21/2019 at Unknown time Yes Reported, Patient   clopidogrel (PLAVIX) 75 MG tablet Take 75 mg by mouth daily 7/21/2019 at 0900 Yes Unknown, Entered By History   Dextromethorphan-Guaifenesin (MUCINEX DM PO) Take by mouth as needed  Past Week at Unknown time Yes Reported, Patient   hydroxychloroquine (PLAQUENIL) 200 MG tablet Take 200 mg by mouth daily 7/21/2019 at Unknown time Yes Unknown, Entered By History   losartan (COZAAR) 25 MG tablet Take 25 mg by mouth daily  7/21/2019 at Unknown time Yes Reported, Patient   Multiple Vitamin (DAILY MULTIVITAMIN PO) Take 2 tablets by mouth every morning 7/21/2019 at Unknown time Yes Reported, Patient   pantoprazole (PROTONIX) 40 MG EC tablet Take 40 mg by mouth every evening 7/21/2019 at Unknown time Yes Unknown, Entered By History   predniSONE (DELTASONE) 10 MG tablet Take 10 mg by mouth daily Take one 5 mg and one 10 mg tab daily for a total of 15 mg daily.. 7/21/2019 at Unknown time Yes Nickie Millan MD   predniSONE (DELTASONE) 5 MG tablet Take one 5 mg and one 10 mg tab daily for a total of 15 mg daily.. 7/21/2019 at Unknown time Yes Nickie Millan MD   alendronate (FOSAMAX) 70 MG tablet Take 1 tablet (70 mg) by mouth every 7 days 7/16/2019  Edu Roberts MD   azelastine (ASTELIN) 137 MCG/SPRAY nasal spray [place] 1 spray into both nostrils daily as needed    Reported, Patient   sulfamethoxazole-trimethoprim (BACTRIM DS/SEPTRA DS) 800-160 MG tablet Take 1 tablet by mouth Every Mon, Wed, Fri Morning 400-80 7/19/2019  Nickie Millan MD

## 2019-07-22 NOTE — ED TRIAGE NOTES
"Arrives with shortness of breath that has worsened over the past 2 days, denies pain but endorses \"breathing discomfort\" in his chest, denies cough. Alert and oriented, ABCs intact.  "

## 2019-07-23 NOTE — PLAN OF CARE
A&Ox4.  VSS, 92-96% sats on 3L/NC.  LS clear throughout.  Some shortness of breath with ambulation.  O2 up to 4L/NC with ambulation, desats to upper 80's, recovers quickly to mid 90's at rest.  States this is baseline for him.  Ambulating in halls frequently with SBA for O2, using walker.  Started on PO prednisone this evening.  PIV Saline locked.   Tele: SR/ST, HR .  PVCs PACs.  Tylenol x1 for leg discomfort with relief.  Tolerating diet, fair to good appetite.  Voiding without difficulty.  Will continue to monitor.

## 2019-07-23 NOTE — PROGRESS NOTES
07/23/19 0920   Quick Adds   Type of Visit Initial PT Evaluation   Living Environment   Lives With spouse   Living Arrangements house   Home Accessibility stairs to enter home;stairs within home   Number of Stairs, Main Entrance 2   Number of Stairs, Within Home, Primary   (16)   Transportation Anticipated family or friend will provide   Living Environment Comment Patient lives in 2 story home with wife, bed/bath on second floor   Self-Care   Usual Activity Tolerance moderate   Current Activity Tolerance fair   Equipment Currently Used at Home walker, rolling   Functional Level Prior   Ambulation 1-->assistive equipment   Transferring 1-->assistive equipment   Toileting 0-->independent   Bathing 1-->assistive equipment   Communication 0-->understands/communicates without difficulty   Swallowing 0-->swallows foods/liquids without difficulty   Cognition 0 - no cognition issues reported   Fall history within last six months no   Which of the above functional risks had a recent onset or change? none   Prior Functional Level Comment Patient reports using a walker after [facility] stay, previously was independent with mobility and reports that he may soon not need walker   General Information   Onset of Illness/Injury or Date of Surgery - Date 07/22/19   Referring Physician Fe Solano PA-C   Patient/Family Goals Statement return to home, increase endurance   Pertinent History of Current Problem (include personal factors and/or comorbidities that impact the POC) Patient with PMH including RA, interstitial lung disease on chronic supplemental O2, HTN, recent admission from 6/4-6/7 at [facility] for left central retinal artery occulusion s/p hyperbaric O2 therapy, subsequently diagnosed with left occipital lobe stroke now admitted with acute on chronic hypoxia and hypercarbia respiratory failure, elevated troponin (likely demand ischemia) and generalized weakness.     Precautions/Limitations oxygen therapy device and  "L/min  (3-4L NC)   Cognitive Status Examination   Orientation orientation to person, place and time   Pain Assessment   Patient Currently in Pain No   Integumentary/Edema   Integumentary/Edema Comments brusing at arms    Posture    Posture Forward head position;Protracted shoulders   Range of Motion (ROM)   ROM Comment WFL   Strength   Strength Comments mild global strength defcits, requires UE support for sit to stand transfers, limited activity tolerance   Bed Mobility   Bed Mobility Comments independent   Transfer Skills   Transfer Comments SBA with 2WW   Gait   Gait Comments amb 200 feet with SBA with 2WW, required 2 seated rest breaks   Balance   Balance Comments no loss of balance noted with mobility   Clinical Impression   Criteria for Skilled Therapeutic Intervention evaluation only   PT Diagnosis decreased activity tolerance   Influenced by the following impairments decreased activity tolerance   Functional limitations due to impairments amb long distances   Clinical Presentation Stable/Uncomplicated   Clinical Presentation Rationale complex pmh, stable presentation, good social support   Clinical Decision Making (Complexity) Low complexity   Therapy Frequency   (evaluation only)   Predicted Duration of Therapy Intervention (days/wks) evaluation only   Anticipated Discharge Disposition Home  (with pulmonary rehab)   Risk & Benefits of therapy have been explained Yes   Patient, Family & other staff in agreement with plan of care Yes   [facility] [place] Mobile-PAC TM \"6 Clicks\"   2016, Trustees of [facility], under license to [employer].  All rights reserved.   6 Clicks Short Forms Basic Mobility Inpatient Short Form   [facility] AM-PAC  \"6 Clicks\" V.2 Basic Mobility Inpatient Short Form   1. Turning from your back to your side while in a flat bed without using bedrails? 4 - None   2. Moving from lying on your back to sitting on the side of a flat bed without using bedrails? 4 - None   3. Moving " to and from a bed to a chair (including a wheelchair)? 4 - None   4. Standing up from a chair using your arms (e.g., wheelchair, or bedside chair)? 4 - None   5. To walk in hospital room? 4 - None   6. Climbing 3-5 steps with a railing? 4 - None   Basic Mobility Raw Score (Score out of 24.Lower scores equate to lower levels of function) 24   Total Evaluation Time   Total Evaluation Time (Minutes) 20

## 2019-07-23 NOTE — PLAN OF CARE
VSS, afeb., LS are clear, 96% on 3L. Pt ambulated this shift, desats into high 80's, but recovers well. Pt last trop was .046, next due at 0600. Pt on Reg diet, good appetite, ate 100% of dinner.  Pt is DNR/DNI. Tele is SR.. PLan discussed with pt and his wife. Giving nebs and IV Solumedrol for hypoxia.

## 2019-07-23 NOTE — PLAN OF CARE
A&Ox4. VSS; 99% 02 on 3LPM. LS clear. Denies pain. Up w/ A1 and walker. Tele SR w/ PVCs and PACs. Continues on IV Solumedrol and PRN nebs.

## 2019-07-23 NOTE — PLAN OF CARE
PT: Patient seen by physical therapy for evaluation.  Patient with PMH including RA, interstitial lung disease on chronic supplemental O2, HTN, recent admission from 6/4-6/7 at [facility] for left central retinal artery occulusion s/p hyperbaric O2 therapy, subsequently diagnosed with left occipital lobe stroke now admitted with acute on chronic hypoxia and hypercarbia respiratory failure, elevated troponin (likely demand ischemia) and generalized weakness.  Patient lives with wife in 2 story home.  Patient was attending pulmonary rehab until approximately 1 month ago, when he went out of town and was also hospitalized at [facility].  Patient reported that at baseline he does not use 2WW, but has been using 2WW since admission to [facility].  Patient's goal is to amb without walker.    Discharge Planner PT   Patient plan for discharge: return to home  Current status: Patient supine upon arrival, on 3L NC at rest.  Patient is independent with bed mobility, SBA with transfers with 2WW.  Patient requested O2 at 4LPM during activity.  Patient amb 200 feet with 2WW and graded assist from SBA to independent.  Patient required 2 seated rest breaks, reports this is his baseline. Returned to supine following ambulation.  Patient with SaO2 82-84% following activity, however unable to get good read from pulse oximeter.  Patient recovered to 95% within 2 min.   Barriers to return to prior living situation: none  Recommendations for discharge: home with OP Pulmonary rehab   Rationale for recommendations: Patient presents close to baseline for all mobility.  Patient is independent with mobility, does not require ongoing physical therapy during hospital stay.  Recommend daily walking program to maintain strength and endurance during hospital stay, then return to pulmonary rehab at discharge.       Entered by: Sunshine Ferrer 07/23/2019 9:47 AM

## 2019-07-23 NOTE — PROGRESS NOTES
[facility]  Hospitalist Progress Note for 7/23/2019:          Assessment and Plan:   J Luis Wolf is a 79 year old male with PMH significant for RA, interstitial lung disease on chronic supplemental oxygen, HTN, and recently admitted from 6/4-6/7/19 at [facility] for left central retinal artery occlusion s/p hyperbaric oxygen therapy (only completed 6 of 10 treatments due to significant claustrophobia and minimal benefit) and subsequently diagnosed with left occipital lobe stroke for which he is now on Plavix and ASA for 3 months to follow with ASA monotherapy after this who presents to the ED on 7/22/19 for evaluation of worsening shortness of breath. ED workup reveals stable oxygen saturations on 4 L via NC, chloride of 93, carbon dioxide of 45, CK of 72, proBNP of 328, troponin of 0.048, glucose of 88, VBG shows pH of 7.38, PCO2 of 86, PO2 of 23, bicarbonate of 15, oxyhemoglobin of 38, hemoglobin 12.9, d-dimer of 2.5, CT of chest shows no evidence of PE, severe interstitial lung disease with fibrosis and peripheral honeycombing, and coronary artery calcifications, and EKG shows rate of 94 bpm in sinus rhythm with incomplete RBBB, minimal voltage LVH.       Acute on chronic hypoxia and hypercarbia respiratory failure 2/2 interstitial lung disease: worsening exertional shortness of breath over the past three days with his oxygen saturations between 89-94%, which is lower than usual for him when using 4 L on his home supplemental oxygen. At baseline patient uses 2-3 L supplemental oxygen at rest and increases to 4 L when active. Follows with Dr. Millan of pulmonology at [facility] with his PFTs stable at his most recent f/u in 04/2019 with plans to be seen again in 08/2019. VBG today looks worse than the one recently obtained while the patient was admitted at [facility] in 06/2019 (in care everywhere). Suspect acute worsening 2/2 deconditioning from recent hospital stay, possible malfunction of his supplemental  oxygen tank at home (wife is currently having a technician check this), and changes in humidity. Patient is chronically on Prednisone 15 mg daily, will hold this while administering IV steroids.     - Continuous pulse oximetry  - better  - change IV Solumedrol 40 mg every 8 hours to prednisone 40 mg/day & taper  - Supplemental oxygen, wean as tolerated down to baseline of 2-3 L at rest and up to 4 L with activity   - PRN Duo nebs every 4 hours       Elevated troponin: likely demand ischemia, no associated chest pain, family history of MI in father but no personal h/o CAD, mild troponin leak during [place] [facility]. Initial troponin today of 0.048 and EKG unremarkable for ischemic changes.  - troponins remain flat , recent ECHO nl LVEF  - f/u as outpt for stress test      Generalized weakness, muscle aches: likely multifactorial and related to recent hospitalization causing decreased exercise tolerance, known RA, and acute increase in statin due to recent stroke which has been causing myalgias in bilateral calves. Patient stopped taking his Atorvastatin on his own this past Friday.   - initially held Atorvastatin  -  muscle aches improved, resume PTA stain but at a lower dose 10 mg (intolerant to 40 mg but previously tolerated 10 mg/day)  - PT consult      Recent left CRAO, occipital CVA: [place] from 6/4[place]6/7[place] [facility] [place] central [place] artery occlusion s/p hyperbaric oxygen therapy (only completed 6 of 10 treatments due to significant claustrophobia and minimal benefit) and subsequently diagnosed with left occipital lobe stroke found on MRI for which he is now on Plavix and ASA for 3 months to follow with ASA monotherapy. Residual left eye central vision loss. Suppose to be seen in f/u by neurology today but missed this appointment due to coming into the hospital.   - Continue PTA Plavix and ASA  - resume PTA stain but at a lower dose 10 mg (intolerant to 40 mg but previously tolerated 10 mg/day)  -  Reschedule follow up with neurology upon discharge       RA: follows with Dr. Roberts of rheumatology for management.   - Continue PTA Plaquenil       HTN: stable, continue PTA Losartan      DVT Prophylaxis: Pneumatic Compression Devices  Code Status: DNR / DNI, discussed with patient and wife at bedside   Disposition: Expected discharge home in AM.               Interval History:   Feeling some better, able to ambulate the hallway with O2 .              Medications:       aspirin  81 mg Oral Daily     atorvastatin  10 mg Oral QPM     clopidogrel  75 mg Oral Daily     hydroxychloroquine  200 mg Oral Daily     losartan  25 mg Oral Daily     pantoprazole  40 mg Oral QPM     predniSONE  40 mg Oral Daily     sodium chloride (PF)  3 mL Intracatheter Q8H     sulfamethoxazole-trimethoprim  1 tablet Oral Q Mon Wed Fri AM     acetaminophen, benzonatate, dextromethorphan-guaiFENesin, ipratropium - albuterol 0.5 mg/2.5 mg/3 mL, lidocaine 4%, lidocaine (buffered or not buffered), melatonin, naloxone, ondansetron **OR** ondansetron, senna-docusate **OR** senna-docusate, sodium chloride (PF)               Physical Exam:   Blood pressure 139/71, pulse 90, temperature 97.2  F (36.2  C), temperature source Oral, resp. rate 20, weight 60.4 kg (133 lb 3.2 oz), SpO2 92 %.  Wt Readings from Last 4 Encounters:   19 60.4 kg (133 lb 3.2 oz)   19 63.6 kg (140 lb 3.2 oz)   19 64.2 kg (141 lb 8 oz)   18 68 kg (150 lb)         Vital Sign Ranges  Temperature Temp  Av  F (36.1  C)  Min: 96  F (35.6  C)  Max: 98  F (36.7  C)   Blood pressure Systolic (24hrs), Av , Min:116 , Max:139        Diastolic (24hrs), Av, Min:65, Max:84      Pulse Pulse  Av.6  Min: 85  Max: 103   Respirations Resp  Av  Min: 20  Max: 20   Pulse oximetry SpO2  Av.1 %  Min: 92 %  Max: 99 %         Intake/Output Summary (Last 24 hours) at 2019 1133  Last data filed at 2019 0796  Gross per 24 hour   Intake 240 ml    Output --   Net 240 ml       Constitutional: Awake, alert, cooperative, no apparent distress   Lungs: Diminished but clear to auscultation bilaterally, no crackles or wheezing   Cardiovascular: Regular rate and rhythm, normal S1 and S2, and no murmur noted   Abdomen: Normal bowel sounds, soft, non-distended, non-tender   Skin: No rashes, no cyanosis, no edema          Data:   All laboratory data reviewed

## 2019-07-24 NOTE — PLAN OF CARE
Pt A&O x4. Up w/ SBA.  VSS. On 3L NC at rest, and 4L NC with activity (which is baseline). Tele SR. Plan is to d/c home this afternoon. Will continue to monitor.     Addendum 1203: Writer provided discharge instructions. Questions asked and answered. All belongings returned to patient. Discharged home with wife, wheel chair escort by nursing staff.

## 2019-07-24 NOTE — DISCHARGE SUMMARY
[facility]    Discharge Summary  Hospitalist    Date of Admission:  7/22/2019  Date of Discharge:  7/24/2019  Discharging Provider: Yoanna Messer MD    Discharge Diagnoses   1. Acute on chronic hypoxia and hypercarbia respiratory failure 2/2 interstitial lung disease  2. Elevated troponin  3. Generalized weakness, muscle aches  4. Recent left CRAO, occipital CVA    Other diagnosis  1. Rheumatoid arthritis  2. Steroid dependent  3. Hypertension       Chief Complaint:  Shortness of Breath    History of Present Illness   Hospital Course   J Luis Wolf was admitted on 7/22/2019.    J Luis Wolf is a 79 year old male with PMH significant for RA, interstitial lung disease on chronic supplemental oxygen, HTN, and recently admitted from 6/4-6/7/19 at [facility] for left central retinal artery occlusion s/p hyperbaric oxygen therapy (only completed 6 of 10 treatments due to significant claustrophobia and minimal benefit) and subsequently diagnosed with left occipital lobe stroke for which he is now on Plavix and ASA for 3 months to follow with ASA monotherapy after this who presents to the ED on 7/22/19 for evaluation of worsening shortness of breath. ED workup reveals stable oxygen saturations on 4 L via NC, chloride of 93, carbon dioxide of 45, CK of 72, proBNP of 328, troponin of 0.048, glucose of 88, VBG shows pH of 7.38, PCO2 of 86, PO2 of 23, bicarbonate of 15, oxyhemoglobin of 38, hemoglobin 12.9, d-dimer of 2.5, CT of chest shows no evidence of PE, severe interstitial lung disease with fibrosis and peripheral honeycombing, and coronary artery calcifications, and EKG shows rate of 94 bpm in sinus rhythm with incomplete RBBB, minimal voltage LVH.       Acute on chronic hypoxia and hypercarbia respiratory failure 2/2 interstitial lung disease: worsening exertional shortness of breath over the past three days with his oxygen saturations between 89-94%, which is lower than usual for him  when using 4 L on his home supplemental oxygen. At baseline patient uses 2-3 L supplemental oxygen at rest and increases to 4 L when active. Follows with Dr. Millan of pulmonology at [facility] with his PFTs stable at his most recent f/u in 04/2019 with plans to be seen again in 08/2019. VBG today looks worse than the one recently obtained while the patient was admitted at [facility] in 06/2019 (in care everywhere). Suspect acute worsening 2/2 deconditioning from recent hospital stay, possible malfunction of his supplemental oxygen tank at home (wife is currently having a technician check this), and changes in humidity. Patient is chronically on Prednisone 15 mg daily, will hold this while administering IV steroids.     - Continuous pulse oximetry  - better  - change IV Solumedrol 40 mg every 8 hours to prednisone 40 mg/day & taper  - Supplemental oxygen, wean as tolerated down to baseline of 2-3 L at rest and up to 4 L with activity   - PRN Duo nebs every 4 hours       Elevated troponin: likely demand ischemia, no associated chest pain, family history of MI in father but no personal h/o CAD, mild troponin leak during hospitalization at [facility]. Initial troponin today of 0.048 and EKG unremarkable for ischemic changes.  - troponins remain flat , recent ECHO nl LVEF  - f/u as outpt for stress test      Generalized weakness, muscle aches: likely multifactorial and related to recent hospitalization causing decreased exercise tolerance, known RA, and acute increase in statin due to recent stroke which has been causing myalgias in bilateral calves. Patient stopped taking his Atorvastatin on his own this past Friday.   - initially held Atorvastatin  -  muscle aches improved, resume PTA stain but at a lower dose 10 mg (intolerant to 40 mg but previously tolerated 10 mg/day)  - PT consult      Recent left CRAO, occipital CVA: admitted from 6/4-6/7/19 at [facility] for left central retinal artery occlusion s/p hyperbaric oxygen therapy (only  completed 6 of 10 treatments due to significant claustrophobia and minimal benefit) and subsequently diagnosed with left occipital lobe stroke found on MRI for which he is now on Plavix and ASA for 3 months to follow with ASA monotherapy. Residual left eye central vision loss. Suppose to be seen in f/u by neurology today but missed this appointment due to coming into the hospital.   - Continue PTA Plavix and ASA  - resume PTA stain but at a lower dose 10 mg (intolerant to 40 mg but previously tolerated 10 mg/day)  - Reschedule follow up with neurology upon discharge       RA: follows with Dr. Roberts of rheumatology for management.   - Continue PTA Plaquenil       HTN: stable, continue PTA Losartan      Yoanna Messer MD    Significant Results and Procedures   No procedures    Pending Results   none  Unresulted Labs Ordered in the Past 30 Days of this Admission     No orders found from 6/22/2019 to 7/23/2019.          Code Status   DNR/DNI       Primary Care Physician   Mario Foster    Physical Exam   Temp: 97.2  F (36.2  C) Temp src: Oral BP: 124/64   Heart Rate: 79 Resp: 20 SpO2: 96 % O2 Device: Nasal cannula Oxygen Delivery: 3 LPM  Vitals:    07/22/19 1529   Weight: 60.4 kg (133 lb 3.2 oz)     Vital Signs with Ranges  Temp:  [96  F (35.6  C)-97.7  F (36.5  C)] 97.2  F (36.2  C)  Heart Rate:  [79-89] 79  Resp:  [18-20] 20  BP: (105-124)/(53-72) 124/64  SpO2:  [96 %-98 %] 96 %  I/O last 3 completed shifts:  In: 240 [P.O.:240]  Out: -     Examination:  Well-built well-nourished. In NAD  Heart: Regular rhythm. S1S2, no murmurs, rubs,gallops  Lungs: Clear to auscultation. And no rhonchi rales or wheezing heard.  Abdomen: Soft and nontender. Bowel sounds present.  Extremities: No swelling.  Neuro:A,A,Ox3, motor sensory grossly intact    Discharge Disposition   Discharged to home  Condition at discharge: Stable    Consultations This Hospital Stay   PHYSICAL THERAPY ADULT IP CONSULT    Time Spent on this  Encounter   I, Yoanna Messer MD, personally saw the patient today and spent greater than 30 minutes discharging this patient.    Discharge Orders      Reason for your hospital stay    COPD with exacerbation     Follow-up and recommended labs and tests     1.Follow up with primary care provider, Mario Foster, within 7 days for hospital follow- up.  No follow up labs or test are needed.  2.Follow up with pulmonary medicine - pt already has an appointment.     Activity    Your activity upon discharge: activity as tolerated     DNR/DNI     Oxygen Adult    Continue previous O2.     Diet    Follow this diet upon discharge: Orders Placed This Encounter      Combination Diet Regular Diet Adult     Discharge Medications   Discharge Medication List as of 7/24/2019  9:55 AM      CONTINUE these medications which have CHANGED    Details   atorvastatin (LIPITOR) 10 MG tablet Take 1 tablet (10 mg) by mouth every evening, Disp-30 tablet, R-0, E-Prescribe      predniSONE (DELTASONE) 10 MG tablet Taper-  40 mg /day x 3 days,then  30 mg/day x 3 days then   20 mg/day x 3 days then resume PTA 15 mg/day    Please dispense 40 pills, Disp-40 tablet, R-0, Local Print         CONTINUE these medications which have NOT CHANGED    Details   acetaminophen (TYLENOL) 500 MG tablet Take 500 mg by mouth every 6 hours as needed for mild pain, Historical      aspirin 81 MG EC tablet Take 81 mg by mouth daily, Historical      benzonatate (TESSALON) 100 MG capsule Take 1 capsule (100 mg) by mouth 3 times daily as needed for cough, Disp-270 capsule, R-3, E-Prescribe      Calcium Citrate-Vitamin D ( CALCIUM CITRATE+/VIT D3 PO) Take by mouth 2 times daily 630/500iu, Historical      cetirizine (ZYRTEC) 10 MG tablet Take 10 mg by mouth daily, Historical      Cholecalciferol (D3-1000 PO) Take by mouth daily, Historical      clopidogrel (PLAVIX) 75 MG tablet Take 75 mg by mouth daily, Historical      Dextromethorphan-Guaifenesin (MUCINEX DM PO)  Take by mouth as needed , Historical      hydroxychloroquine (PLAQUENIL) 200 MG tablet Take 200 mg by mouth daily, Historical      losartan (COZAAR) 25 MG tablet Take 25 mg by mouth daily , Historical      Multiple Vitamin (DAILY MULTIVITAMIN PO) Take 2 tablets by mouth every morning, Historical      pantoprazole (PROTONIX) 40 MG EC tablet Take 40 mg by mouth every evening, Historical      alendronate (FOSAMAX) 70 MG tablet Take 1 tablet (70 mg) by mouth every 7 days, Disp-12 tablet, R-3, E-Prescribe      azelastine (ASTELIN) 137 MCG/SPRAY nasal spray [place] 1 spray into both nostrils daily as needed , Historical      sulfamethoxazole-trimethoprim (BACTRIM DS/SEPTRA DS) 800-160 MG tablet Take 1 tablet by mouth Every Mon, Wed, Fri Morning 400-80, Disp-36 tablet, R-3, E-Prescribe           Allergies   Allergies   Allergen Reactions     Flu Virus Vaccine      Swollen face     Pistachios [Nuts] Swelling and Cough     Not tested medically     Data   Most Recent 3 CBC's:  Recent Labs   Lab Test 07/22/19  0858 04/23/19  1044 11/06/18  1150   WBC 8.4 9.7 8.1   HGB 12.9* 14.1 13.5   * 102* 100    175 193      Most Recent 3 BMP's:  Recent Labs   Lab Test 07/23/19  0611 07/22/19  0858 04/23/19  1044 11/06/18  1150    140  --  139   POTASSIUM 4.2 3.9  --  4.3   CHLORIDE 95 93*  --  96   CO2 42* 45*  --  39*   BUN 17 15  --  24   CR 0.57* 0.70 0.91 1.03   ANIONGAP 1* 2*  --  4   SILVINO 8.7 10.1  --  8.7   * 88  --  88     Most Recent 2 LFT's:  Recent Labs   Lab Test 04/23/19  1044 11/06/18  1150 05/14/18  1432   AST 29 27 104*   ALT 36 37 334*   ALKPHOS  --  61 254*   BILITOTAL  --  0.3 0.5     Most Recent INR's and Anticoagulation Dosing History:  Anticoagulation Dose History     Recent Dosing and Labs Latest Ref Rng & Units 4/24/2018    INR 0.86 - 1.14 1.00        Most Recent 3 Troponin's:  Recent Labs   Lab Test 07/23/19  0611 07/22/19  2116 07/22/19  0858   TROPI 0.033 0.046* 0.048*     Most Recent  Cholesterol Panel:No lab results found.  Most Recent 6 Bacteria Isolates From Any Culture (See EPIC Reports for Culture Details):No lab results found.  Most Recent TSH, T4 and A1c Labs:No lab results found.  Results for orders placed or performed during the hospital encounter of 07/22/19   CT Chest Pulmonary Embolism w Contrast    Narrative    CT CHEST PULMONARY EMBOLISM WITH CONTRAST   7/22/2019 11:10 AM     HISTORY: PE suspected, intermediate probability, positive D-dimer.  Chest pain.    TECHNIQUE:  58 mL [id number]. Radiation dose for this scan was reduced  using automated exposure control, adjustment of the mA and/or kV  according to patient size, or iterative reconstruction technique.    COMPARISON: 4/19/2019    FINDINGS:  There is no evidence of pulmonary embolism or acute  thoracic aortic abnormality. There is moderate coronary  atherosclerosis.    The lungs are fibrotic with honeycombing, similar to prior. No new  airspace consolidation or mass demonstrated. There is nonaneurysmal  aortic atherosclerosis. No enlarged thoracic or axillary lymph nodes.  No pleural or pericardial effusion. No pneumothorax. The heart is  enlarged. There is a compression deformity of T5 and the inferior  endplate of T4, similar to prior.      Impression    IMPRESSION:  1. No evidence of pulmonary embolism.  2. Severe interstitial lung disease with fibrosis and peripheral  honeycombing.  3. Coronary artery calcifications.    CARLOS MANUEL RIVERA MD

## 2019-07-24 NOTE — PLAN OF CARE
Orientation: Alert and oriented x4  VSS. 98% on 3L O2.   Tele:NSR w/PACs. HR 86.   LS: Clear, denies SOB/DALE  GI: BS audible/active x4, tolerating PO. Passing gas. No BM this shift. Denies N/V.   : Adequate urine output. Yes  Skin: Intact  Activity: SBA w/ walker. Pt slept comfortably throughout shift.   Pain: 0/10  Plan: Supportive cares, pulse oximetry, PRN nebs, PO steroids, plan is to discharge today. Continue with current cares.

## 2019-08-01 NOTE — MR AVS SNAPSHOT
After Visit Summary   4/24/2018    J Luis Wolf    MRN: [id number]           Patient Information     Date Of Birth          1940        Visit Information        Provider Department      4/24/2018 10:30 AM Edu Roberts MD [facility]        Today's Diagnoses     Rheumatoid arthritis involving multiple sites with positive rheumatoid factor (H)    -  1    ILD (interstitial lung disease) (H)        Long-term use of immunosuppressant medication        Disorder of bone and cartilage        Osteoporosis without current pathological fracture, unspecified osteoporosis type           Follow-ups after your visit        Follow-up notes from your care team     Return in about 6 months (around 10/24/2018).      Your next 10 appointments already scheduled     Apr 24, 2018 11:30 AM CDT   Lab with  LAB   [facility] ([facility])    [address] [place] [address]  [address] [facility] [address]   [phone]            May 09, 2018  3:30 PM CDT   FULL PULMONARY FUNCTION with  PFL C   [facility] Function Testing ([facility])    [address] [place] [id number] [phone]            May 09, 2018  4:30 PM CDT   (Arrive by 4:15 PM)   Return Interstitial Lung with Bill Kraft MD   [facility] ([facility])    [address] [place] [id number] [phone]            Nov 06, 2018 10:00 AM CST   DX HIP/PELVIS/SPINE with UCDX1   [facility] ([facility])    [address] [place] [id number] [phone]           Please do not take any of the following 24 hours prior to the day of your exam: vitamins, calcium tablets, antacids.  If possible, please wear clothes without metal (snaps, zippers). A sweatsuit works well.            Nov 06, 2018 10:30 AM CST   (Arrive by       Chief Complaint   Patient presents with    Hearing Loss    Other     Referred by  for sinus.   .    HPI:     Scooter Morrissey is a 69 y.o. male who is referred by Dr. Katelynn Rojas for evaluation of a several month history of foul smelling discharge in his nose.  He reports that he has nasal congestion and postnasal drip.  He describes difficulty breathing at night. There is bilateral nasal obstruction. He does use sinus rinses or nasal sprays. He has been using nasal saline rinses. He is on Flonase. He has seen an outside ENT who prescribed a Zpak and gave him an IM steroid injection without relief. He has had an abnormal CT maxillofacial ordered by Dr. Rojas in April 2019.   He denies midface pain and pressure.  He admits to rhinorrhea and postnasal drip. There is not maxillary tooth pain. He  admits to headaches.  He has not had sinus or nasal surgery. There is no history of sinonasal trauma.          Past Medical History:   Diagnosis Date    Anxiety 6/29/2016    Benign essential hypertension 4/5/2016    Hyperlipidemia 4/13/2016    Interstitial pneumonitis 4/13/2016     Social History     Socioeconomic History    Marital status:      Spouse name: Not on file    Number of children: Not on file    Years of education: Not on file    Highest education level: Not on file   Occupational History    Occupation: lawn service   Social Needs    Financial resource strain: Not on file    Food insecurity:     Worry: Not on file     Inability: Not on file    Transportation needs:     Medical: Not on file     Non-medical: Not on file   Tobacco Use    Smoking status: Former Smoker     Types: Cigars    Smokeless tobacco: Former User     Quit date: 5/12/1996   Substance and Sexual Activity    Alcohol use: No    Drug use: No    Sexual activity: Yes     Partners: Female   Lifestyle    Physical activity:     Days per week: Not on file     Minutes per session: Not on file    Stress: Not on  "10:15 AM)   Return Visit with Edu Roberts MD   [facility] ([facility])    [address]  [address]  [facility] [address] [phone]              Future tests that were ordered for you today     Open Future Orders        Priority Expected Expires Ordered    DX Hip/Pelvis/Spine Routine 10/24/2018 4/24/2019 4/24/2018            Who to contact     If you have questions or need follow up information about today's clinic visit or your schedule please contact [facility] RHE[facility]AT[facility] directly at [phone].  Normal or non-critical lab and imaging results will be communicated to you by HQ plushart, letter or phone within 4 business days after the clinic has received the results. If you do not hear from us within 7 days, please contact the clinic through PHHH[employer] Inct or phone. If you have a critical or abnormal lab result, we will notify you by phone as soon as possible.  Submit refill requests through [employer] or call your pharmacy and they will forward the refill request to us. Please allow 3 business days for your refill to be completed.          Additional Information About Your Visit        MyChart Information     [employer] gives you secure access to your electronic health record. If you see a primary care provider, you can also send messages to your care team and make appointments. If you have questions, please call your primary care clinic.  If you do not have a primary care provider, please call [phone] and they will assist you.        Care EveryWhere ID     This is your Care EveryWhere ID. This could be used by other organizations to access your [place] medical records  [id number]        Your Vitals Were     Pulse Temperature Height Pulse Oximetry BMI (Body Mass Index)       103 99.6  F (37.6  C) (Oral) 1.676 m (5' 6\") 95% 24.95 kg/m2        Blood Pressure from Last 3 Encounters:   04/24/18 146/78   11/21/17 138/81   10/11/17 144/86    Weight from Last 3 " file   Relationships    Social connections:     Talks on phone: Not on file     Gets together: Not on file     Attends [employer] service: Not on file     Active member of club or organization: Not on file     Attends meetings of clubs or organizations: Not on file     Relationship status: Not on file   Other Topics Concern    Not on file   Social History Narrative    Not on file     Past Surgical History:   Procedure Laterality Date    ADENOIDECTOMY      COLONOSCOPY N/A 6/1/2016    Performed by Meme Mills MD at [facility]    FINGER SURGERY      15 years ago    TONSILLECTOMY       Family History   Problem Relation Age of Onset    No Known Problems Mother     No Known Problems Father     No Known Problems Daughter            Review of Systems  General: negative for chills, fever or weight loss  Psychological: negative for mood changes or depression  Ophthalmic: negative for blurry vision, photophobia or eye pain  ENT: see HPI  Respiratory: no cough, shortness of breath, or wheezing  Cardiovascular: no chest pain or dyspnea on exertion  Gastrointestinal: no abdominal pain, change in bowel habits, or black/ bloody stools  Musculoskeletal: negative for gait disturbance or muscular weakness  Neurological: no syncope or seizures; no ataxia  Dermatological: negative for puritis,  rash and jaundice  Hematologic/lymphatic: no easy bruising, no new lumps or bumps      Physical Exam:    Vitals:    08/01/19 1340   BP: 138/80   Pulse: 70   Temp: 98.2 °F (36.8 °C)       Constitutional: Well appearing / communicating without difficutly.  NAD.  Eyes: EOM I Bilaterally  Head/Face: Normocephalic.  Negative paranasal sinus pressure/tenderness.  Salivary glands WNL.  House Brackmann I Bilaterally.    Right Ear: Auricle normal appearance. External Auditory Canal within normal limits,TM w/o masses/lesions/perforations. TM mobility noted.   Left Ear: Auricle normal appearance. External Auditory Canal WNL,TM w/o  Encounters:   04/24/18 70.1 kg (154 lb 9.6 oz)   11/21/17 70.6 kg (155 lb 9.6 oz)   10/11/17 72.1 kg (158 lb 14.4 oz)              We Performed the Following     Albumin level     ALT     AST     CBC with platelets differential     Creatinine     CRP inflammation     Erythrocyte sedimentation rate auto     Vitamin D Deficiency          Where to get your medicines      These medications were sent to [id number] - [facility] [address] [facility] [address] [facility] [address]  [address], [facility] [address]     Phone:  [phone]     alendronate 70 MG tablet    hydroxychloroquine 200 MG tablet          Primary Care Provider Office Phone # Fax #    Mario ROY [place] [phone]       [facility] [address] [place] [address]        Equal Access to Services     HARPREET MARCANO : Hadii sendy ku hadasho Soomaali, waaxda luqadaha, qaybta kaalmada adeegyada, jose alberto kirkpatrick haychandrakant bullock . So [facility] [phone].    ATENCIÓN: Si habla español, tiene a rivas disposición servicios gratuitos de asistencia lingüística. [facility] [phone].    We comply with applicable federal civil rights laws and [place] laws. We do not discriminate on the basis of race, color, national origin, age, disability, sex, sexual orientation, or gender identity.            Thank you!     Thank you for choosing [facility] RHEUMATOLOGY  for your care. Our goal is always to provide you with excellent care. Hearing back from our patients is one way we can continue to improve our services. Please take a few minutes to complete the written survey that you may receive in the mail after your visit with us. Thank you!             Your Updated Medication List - Protect others around you: Learn how to safely use, store and throw away your medicines at www.disposemymeds.org.          This list is accurate as of 4/24/18 11:18 AM.  Always use your most recent med list.                   Brand Name  masses/lesions/perforations. TM mobility noted.  Nose: Nasal septal deviation to the right. Inferior Turbinates 3+ bilaterally. No septal perforation. No masses/lesions. External nasal skin appears normal without masses/lesions.  Oral Cavity: Gingiva/lips within normal limits.  Dentition/gingiva healthy appearing. Mucus membranes moist. Floor of mouth soft, no masses palpated. Oral Tongue mobile. Hard Palate appears normal.    Oropharynx: Base of tongue appears normal. No masses/lesions noted. Tonsillar fossa/pharyngeal wall without lesions. Posterior oropharynx WNL.  Soft palate without masses. Midline uvula.   Neck/Lymphatic: No LAD I-VI bilaterally.  No thyromegaly.  No masses noted on exam.    Mirror laryngoscopy/nasopharyngoscopy: Active gag reflex.  Unable to perform.    Neuro/Psychiatric: AOx3.  Normal mood and affect.   Cardiovascular: Normal carotid pulses bilaterally, no increasing jugular venous distention noted at cervical region bilaterally.    Respiratory: Normal respiratory effort, no stridor, no retractions noted.          Audiogram reviewed personally by myself and in detail with the patient today.       CT maxillofacial 4/10/2019:   Paranasal sinuses: There are postsurgical findings of bilateral maxillary uncinectomy/antrostomy. Moderate mucosal thickening is present throughout the right maxillary sinus with near complete opacification of the left maxillary sinus. There near complete opacification of the ethmoid air cells and left frontal sinus with complete opacification of the right frontal sinus. Mild mucosal thickening is present in the sphenoid sinuses. Chronic osteitis changes are present involving all of the paranasal sinuses with rarefaction of the ethmoid air cells in keeping with chronic sinus disease.    Nasal cavity: Clear.    Aerodigestive tract: Rightward septal deviation.      Assessment:    ICD-10-CM ICD-9-CM    1. Other chronic sinusitis J32.8 473.8    2. Nasal septal deviation  Dispense Instructions for use Diagnosis    ADVIL PO      Take 200 mg by mouth        alendronate 70 MG tablet    FOSAMAX    12 tablet    Take 1 tablet (70 mg) by mouth every 7 days    ILD (interstitial lung disease) (H), Long-term use of immunosuppressant medication, Rheumatoid arthritis involving multiple sites with positive rheumatoid factor (H), Disorder of bone and cartilage, Osteoporosis without current pathological fracture, unspecified osteoporosis type       atorvastatin 10 MG tablet    LIPITOR     Take 5 mg by mouth every other day        azelastine 0.1 % spray    ASTELIN     Spray 1 spray into both nostrils 2 times daily        benzonatate 100 MG capsule    TESSALON    180 capsule    Take 1 capsule (100 mg) by mouth 3 times daily as needed for cough    ILD (interstitial lung disease) (H)       cetirizine 10 MG tablet    zyrTEC     Take 10 mg by mouth daily        D3-1000 PO      Take by mouth daily        DAILY MULTIVITAMIN PO      Take 2 tablets by mouth every morning        hydroxychloroquine 200 MG tablet    PLAQUENIL    180 tablet    Take 2 tablets (400 mg) by mouth daily    ILD (interstitial lung disease) (H), Long-term use of immunosuppressant medication, Rheumatoid arthritis involving multiple sites with positive rheumatoid factor (H), Disorder of bone and cartilage, Osteoporosis without current pathological fracture, unspecified osteoporosis type       * predniSONE 1 MG tablet    DELTASONE    360 tablet    Patient takes 14 mg daily take 4 1mg tabs by mouth daily with 1 10mg tab.    ILD (interstitial lung disease) (H)       * predniSONE 10 MG tablet    DELTASONE    90 tablet    Take 1 tablet (10 mg) by mouth daily Patient takes a total dose of 13mg daily    ILD (interstitial lung disease) (H)       * predniSONE 5 MG tablet    DELTASONE    45 tablet    Take 3 tablets (15 mg) by mouth daily    ILD (interstitial lung disease) (H)       SM CALCIUM CITRATE+/VIT D3 PO      Take by mouth 2 times daily  J34.2 470    3. Nasal obstruction J34.89 478.19    4. Hypertrophy of inferior nasal turbinate J34.3 478.0      The primary encounter diagnosis was Other chronic sinusitis. Diagnoses of Nasal septal deviation, Nasal obstruction, and Hypertrophy of inferior nasal turbinate were also pertinent to this visit.      Plan:  No orders of the defined types were placed in this encounter.    Augmentin 875mg PO BID x 3 weeks.  Prednisone taper.  Continue Flonase  Add Xyzal  Nasal saline rinses BID.   We briefly discussed that he may need ESS to clear his severe CRS but he is not interested in surgery.   Obtain post-treatment CT scan of the sinuses.    Thank you kindly for allowing me to participate in the patient's care.       Clara Jennings MD   630/500iu        sulfamethoxazole-trimethoprim 800-160 MG per tablet    BACTRIM DS/SEPTRA DS    36 tablet    Take 1 tablet by mouth Every Mon, Wed, Fri Morning 400-80    ILD (interstitial lung disease) (H)       TYLENOL PO      Take 325 mg by mouth as needed for mild pain or fever    ILD (interstitial lung disease) (H), Long-term use of immunosuppressant medication, Rheumatoid arthritis involving multiple sites with positive rheumatoid factor (H)       * Notice:  This list has 3 medication(s) that are the same as other medications prescribed for you. Read the directions carefully, and ask your doctor or other care provider to review them with you.

## 2019-08-16 NOTE — LETTER
8/16/2019       RE: J Luis Wolf  [id number] [address] [facility] [id number]     Dear Colleague,    Thank you for referring your patient, J Luis Wolf, to the [facility] at [facility]. Please see a copy of my visit note below.    [facility]    Reason for Visit  J Luis Wolf is a 79 year old year old male who is being seen for Interstitial Lung Disease (ILD) (RA/ILD)    HPI    Mr. Wolf is a 79-year-old with rheumatoid arthritis interstitial lung disease who is here for follow-up.  He was hospitalized in June at [facility] with a left retinal artery occlusion and stroke.  He was started on Plavix and Lipitor.  He was then hospitalized in July at [facility] with leg pains and other issues.  Lipitor was stopped at that time, but then it was restarted at 10 mg daily.  Mr. Wolf's wife recently stopped the Plavix after talking with neurology, because she noted that weakness was listed as a potential side effect.  Mr. Wolf has been very weak since starting the Lipitor perhaps associated with the leg pains.  He currently is getting physical therapy for his neck and also to help him regain his strength.  He has been using a walker to walk since his hospitalization in July.  He denies fevers, cough, chest pain, or syncope.  He uses oxygen at home 4 L/min, and that is about the same.  He was treated with increased doses of prednisone during his hospitalization in July because of shortness of breath.  A chest CT in July showed no changes in the ILD and no evidence of new changes.  The prednisone has now been tapered down to 20 mg daily.        Current Outpatient Medications   Medication     alendronate (FOSAMAX) 70 MG tablet     aspirin 81 MG EC tablet     atorvastatin (LIPITOR) 10 MG tablet     benzonatate (TESSALON) 100 MG capsule     Calcium Citrate-Vitamin D (SM CALCIUM  CITRATE+/VIT D3 PO)     cetirizine (ZYRTEC) 10 MG tablet     Cholecalciferol (D3-1000 PO)     Coenzyme Q10 (COQ10) 100 MG CAPS     hydroxychloroquine (PLAQUENIL) 200 MG tablet     losartan (COZAAR) 25 MG tablet     Multiple Vitamin (DAILY MULTIVITAMIN PO)     pantoprazole (PROTONIX) 40 MG EC tablet     predniSONE (DELTASONE) 10 MG tablet     acetaminophen (TYLENOL) 500 MG tablet     azelastine (ASTELIN) 137 MCG/SPRAY nasal spray     clopidogrel (PLAVIX) 75 MG tablet     Dextromethorphan-Guaifenesin (MUCINEX DM PO)     sulfamethoxazole-trimethoprim (BACTRIM DS/SEPTRA DS) 800-160 MG tablet     No current facility-administered medications for this visit.      Facility-Administered Medications Ordered in Other Visits   Medication     sodium chloride (PF) 0.9% PF flush 10 mL     sodium chloride bacteriostatic 0.9 % flush 10 mL     Allergies   Allergen Reactions     Flu Virus Vaccine      Swollen face     Pistachios [Nuts] Swelling and Cough     Not tested medically     Past Medical History:   Diagnosis Date     ILD (interstitial lung disease) (H)     associated with rheumatoid arthritis, present on CT since 2013 and also has air trapping on CT, methotrexate stopped 7/2013, started MMF 12/2014 but stopped 2015 due to 20 lb weight loss and anorexia.  Did not tolerate imuran due to chills/fevers.     Rheumatoid arthritis (H)     RA dx 2012, + CCP and RF.       Past Surgical History:   Procedure Laterality Date     APPENDECTOMY  1956     BACK SURGERY  2014    Compression fx thoracic vertebra     COLONOSCOPY  2014     ORTHOPEDIC SURGERY  2015    Bunionectomy lt foot     PERCUTANEOUS BIOPSY LIVER N/A 5/10/2018    Procedure: PERCUTANEOUS BIOPSY LIVER;  Percutaneous Liver Biopsy;  Surgeon: Raymon Gann PA-C;  Location: [facility] OR     SOFT TISSUE SURGERY  2015    Bursitis rt hip cortisone injection 11 15 2015     THORACIC SURGERY  2012    Pulmonary fibrosis       Social History     Socioeconomic History     Marital status:       Spouse name: Not on file     Number of children: Not on file     Years of education: Not on file     Highest education level: Not on file   Occupational History     Not on file   Social Needs     Financial resource strain: Not on file     Food insecurity:     Worry: Not on file     Inability: Not on file     Transportation needs:     Medical: Not on file     Non-medical: Not on file   Tobacco Use     Smoking status: Former Smoker     Packs/day: 0.50     Years: 5.00     Pack years: 2.50     Types: Cigarettes     Start date: 1957     Last attempt to quit: 1962     Years since quittin.6     Smokeless tobacco: Never Used   Substance and Sexual Activity     Alcohol use: Yes     Alcohol/week: 1.8 oz     Types: 3 Standard drinks or equivalent per week     Drug use: No     Sexual activity: Not on file   Lifestyle     Physical activity:     Days per week: Not on file     Minutes per session: Not on file     Stress: Not on file   Relationships     Social connections:     Talks on phone: Not on file     Gets together: Not on file     Attends Evangelical service: Not on file     Active member of club or organization: Not on file     Attends meetings of clubs or organizations: Not on file     Relationship status: Not on file     Intimate partner violence:     Fear of current or ex partner: Not on file     Emotionally abused: Not on file     Physically abused: Not on file     Forced sexual activity: Not on file   Other Topics Concern     Parent/sibling w/ CABG, MI or angioplasty before 65F 55M? 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       Family History   Problem Relation Age of Onset     Coronary Artery Disease Father         Age 77  age 80     Hyperlipidemia Father      Hypertension Mother          age 105     Rheumatoid Arthritis No family hx of              ROS Pulmonary  A complete ROS  "was otherwise negative except as noted in the HPI.    Vitals: /76 (BP Location: Right arm, Patient Position: Chair, Cuff Size: Adult Regular)   Pulse 96   Resp 18   Ht 1.702 m (5' 7\")   Wt 59.9 kg (132 lb)   SpO2 96%   BMI 20.67 kg/m       Exam:   GENERAL APPEARANCE: Well developed, well nourished, alert, and in no apparent distress.  RESP: good air flow throughout.  Bibasilar inspiratory crackles. No rhonchi. No wheezes.  CV: Normal S1, S2, regular rhythm, normal rate. No murmur.  No LE edema.   MS: extremities normal. No clubbing. No cyanosis.  SKIN: no rash on limited exam.  NEURO: Mentation intact, speech normal, normal gait and stance.  PSYCH: mentation appears normal. and affect normal/bright.    Results:  Recent Results (from the past 168 hour(s))   General PFT Lab (Please always keep checked)    Collection Time: 08/16/19 10:10 AM   Result Value Ref Range    FVC-Pred 3.41 L    FVC-Pre 1.13 L    FVC-%Pred-Pre 33 %    FEV1-Pre 0.89 L    FEV1-%Pred-Pre 34 %    FEV1FVC-Pred 75 %    FEV1FVC-Pre 79 %    FEFMax-Pred 6.48 L/sec    FEFMax-Pre 3.73 L/sec    FEFMax-%Pred-Pre 57 %    FEF2575-Pred 1.87 L/sec    FEF2575-Pre 0.76 L/sec    UNR8691-%Pred-Pre 40 %    ExpTime-Pre 5.80 sec    FIFMax-Pre 2.40 L/sec    VC-Pred 3.85 L    VC-Pre 1.15 L    VC-%Pred-Pre 29 %    IC-Pred 2.78 L    IC-Pre 0.79 L    IC-%Pred-Pre 28 %    ERV-Pred 1.07 L    ERV-Pre 0.36 L    ERV-%Pred-Pre 33 %    FEV1FEV6-Pred 76 %    FEV1FEV6-Pre 79 %    FRCPleth-Pred 3.54 L    FRCPleth-Pre 1.82 L    FRCPleth-%Pred-Pre 51 %    RVPleth-Pred 2.70 L    RVPleth-Pre 1.46 L    RVPleth-%Pred-Pre 54 %    TLCPleth-Pred 6.31 L    TLCPleth-Pre 2.61 L    TLCPleth-%Pred-Pre 41 %    DLCOunc-Pred 21.62 ml/min/mmHg    DLCOunc-Pre 8.41 ml/min/mmHg    DLCOunc-%Pred-Pre 38 %    VA-Pre 2.43 L    VA-%Pred-Pre 44 %    FEV1SVC-Pred 66 %    FEV1SVC-Pre 77 %       I reviewed pulmonary function test that was performed today.  This shows severe restriction with a severe " diffusion diffusion defect.  Lung function is stable and has been stable for the past year.  I also reviewed the chest CT scan that was performed in July at [facility].  This shows no significant change in the pulmonary fibrosis.  There are no new groundglass opacities or consolidations to suggest acute exacerbation or pneumonia.    I reviewed results with the patient.      Assessment and plan:   Mr. Wolf is a 79-year-old with rheumatoid arthritis ILD who is here for follow-up.  He has had 2 hospitalizations this summer, the first for left retinal artery occlusion and stroke in the second for weakness and leg pains after starting Lipitor.  1.  Rheumatoid arthritis ILD.  His main symptom or complaint today is weakness.  PFT is stable, and chest CT last month during his hospitalization showed no new changes in the pulmonary fibrosis and nothing to suggest acute exacerbation or new pneumonia.  He was treated with higher dose of prednisone in the hospital, and he has currently tapered down to 20 mg daily.  Given the stability of his chest CT and PFT, I think it would be fine for him to taper down to 15 mg daily of prednisone in the next 1 to 2 weeks depending on how he feels.  I encouraged him to continue with physical therapy for his neck pain.  Once he is done with physical therapy, then I would recommend that he do pulmonary rehab.  He and his wife are in agreement with this plan.  He will return to clinic in 3 months with full PFT for follow-up.  2.  Exertional hypoxia.  He will continue oxygen minimum 4 L/min.  Walk test last visit showed that he requires 6 L/min of oxygen with walking    Again, thank you for allowing me to participate in the care of your patient.      Sincerely,    Nickie Millan MD

## 2019-08-16 NOTE — NURSING NOTE
Chief Complaint   Patient presents with     Interstitial Lung Disease (ILD)     RA/ILD     Pratima Escobar CMA

## 2019-10-21 PROBLEM — S12.110A ODONTOID FRACTURE (H): Status: ACTIVE | Noted: 2019-01-01

## 2019-10-21 NOTE — ED PROVIDER NOTES
"  History     Chief Complaint:  Fall     The history is provided by the patient.      J Luis Wolf is a 79 year old male on 5 L of oxygen, who presents with his wife for a fall. Three days ago (10/18/2019), patient was sitting on a stool by bedside when he slid off and fell onto his side. He did not lose consciousness and felt fine afterwards. Then the next day (10/19/2019), patient had an onset of neck pain. He then went to [facility] urgent [facility] and had negative imaging workup. There was arthritis noted in the neck.  He was placed in a soft c-collar and was discharged home. Patient was able to ambulate the next day. However last night, patient was awake throughout the night and stated \"I am not feeling like myself.\" Wife states he has been more confused, prompting them to the ED. Here, patient has posterior neck pain. Wife notes his urine is darker in color. No fever, vomiting, diarrhea, cough, and chest pain. Wife has had a cold.     Of note, patient has been tapering off of his prednisone. He was originally on 20 mg but has been using 18 mg the past two weeks.     Allergies:  Flu Virus Vaccine  Pistachios [Nuts]     Medications:    Fosamax  Aspirin 81 mg tablet  Lipitor   Tessalon    Calcium citrate- Vitamin D  Zyrtec  Cholecalciferol   Plavix  Coenzyme Q10  Mucinex  Plaquenil   Cozaar  Multiple vitamin   Protonix   Prednisone   Bactrim     Past Medical History:    Acute respiratory failure  Rheumatoid arthritis   ILD  Osteoporosis  Bursitis  Gluteal tendinitis  Atropic rhinitis   Contact dermatitis  HLD  Colon polyps    Central retinal artery occlusion     Past Surgical History:    Appendectomy  Back surgery   Colonoscopy  Bunionectomy  Percutaneous biopsy  Soft tissue surgery   Thoracic surgery      Family History:    CAD - father   HLD - father   HTN - mother     Social History:  Former smoker.   Positive for alcohol use.   Negative for drug use.  Marital Status:  .     Review of Systems   Constitutional: " Negative for fever.   Gastrointestinal: Negative for vomiting.   Musculoskeletal: Positive for neck pain.   All other systems reviewed and are negative.        Physical Exam     Patient Vitals for the past 24 hrs:   BP Temp Temp src Pulse Heart Rate Resp SpO2   10/21/19 1945 (!) 141/88 -- -- 131 111 -- 97 %   10/21/19 1930 (!) 145/118 -- -- 91 115 18 98 %   10/21/19 1915 (!) 153/98 -- -- 106 106 (!) 32 98 %   10/21/19 1900 -- -- -- 115 117 20 96 %   10/21/19 1845 -- -- -- 104 98 (!) 31 91 %   10/21/19 1830 -- -- -- -- 112 10 --   10/21/19 1815 (!) 160/94 -- -- 99 88 21 --   10/21/19 1800 97/85 -- -- 99 102 11 96 %   10/21/19 1745 124/78 -- -- 103 99 13 98 %   10/21/19 1730 (!) 113/93 -- -- 104 100 18 99 %   10/21/19 1715 114/71 -- -- 101 96 11 97 %   10/21/19 1700 96/79 -- -- 93 104 28 97 %   10/21/19 1645 118/69 -- -- 97 94 17 98 %   10/21/19 1630 114/80 -- -- 95 97 11 98 %   10/21/19 1545 -- -- -- -- 99 29 95 %   10/21/19 1530 114/75 -- -- 87 92 14 94 %   10/21/19 1452 (!) 184/154 98.1  F (36.7  C) Temporal -- 88 16 94 %     Physical Exam  Constitutional:       Appearance: He is well-developed.   HENT:      Head: Atraumatic.      Right Ear: External ear normal.      Left Ear: External ear normal.      Mouth/Throat:      Pharynx: No oropharyngeal exudate.   Eyes:      General: No scleral icterus.     Conjunctiva/sclera: Conjunctivae normal.      Pupils: Pupils are equal, round, and reactive to light.   Neck:      Musculoskeletal: Normal range of motion and neck supple.      Vascular: No JVD.   Cardiovascular:      Rate and Rhythm: Normal rate and regular rhythm.      Heart sounds: Normal heart sounds. No murmur. No friction rub. No gallop.    Pulmonary:      Effort: Pulmonary effort is normal. No respiratory distress.      Breath sounds: Normal breath sounds. No wheezing or rales.   Abdominal:      General: Bowel sounds are normal. There is no distension.      Palpations: Abdomen is soft. There is no mass.       Tenderness: There is no tenderness.   Musculoskeletal: Normal range of motion.   Lymphadenopathy:      Cervical: No cervical adenopathy.   Skin:     General: Skin is warm and dry.      Findings: No rash.   Neurological:      Mental Status: He is alert and oriented to person, place, and time.      Cranial Nerves: No cranial nerve deficit.       Emergency Department Course   ECG:  Indication: Fall   Time: 1540  Vent. Rate 93 bpm. MA interval 176. QRS duration 136. QT/QTc 370/460. P-R-T axis 99 -17 -27. Sinus rhythm with premature supraventricular complexes and with occasional premature ventricular complexes. Right bundle branch block. Moderate voltage criteria for LVH,  May be normal variant. Abnormal ECG. Agrees with computer interpretation. Read time: 1544.    Imaging:  Radiographic findings were communicated with the patient who voiced understanding of the findings.    XR Chest 2 Views   Preliminary Result   IMPRESSION: Diffuse bilateral lower lobe and subpleural predominant   interstitial pulmonary opacities, consistent with patient's history of   interstitial pulmonary disease. Although no definite new focal   pulmonary opacities, difficult to completely exclude superimposing   infection given the existing extensive pulmonary opacities.     As per radiology.      CT Cervical Spine w/o Contrast   Preliminary Result   Abnormal   IMPRESSION:   1. Acute type 2 odontoid fracture, mildly displaced.   2. Acute C1 burst fracture (Severino fracture equivalent).   3. Moderate epidural soft tissue density material at the level of C1,   likely a combination of pannus and epidural hematoma, resulting in   moderate spinal stenosis.   4. Multilevel degenerative disc disease, uncovertebral, and facet   arthropathy of the cervical spine, as detailed.      [Critical Result: Acute cervical spine fracture]      Finding was identified on 10/21/2019 4:05 PM.       Dr. Alarcon was contacted by me on 10/21/2019 4:08 PM and verbalized    understanding of the critical result.       As per radiology.      Laboratory:  CBC: WBC: 7.7, HGB: 11.4 (L), PLT: 159  CMP: Glucose 122 (H), Creatinine 0.60 (L), Albumin 3.3 (L). Protein 6.4 (L), o/w WNL  Magnesium: 2.0   INR: 0.89   1519 Glucose by meter: 119 (H)  1522 Lactic acid whole blood: 1.3   1522 Troponin: 0.055 (H)    UA with micro: ketones 40, pH 8.5 (H), protein albumin 100, mucous present o/w negative.     Interventions:  1658 Dilaudid 0.2 mg IV   1753 Dilaudid 1 mg IV   1845 Haldol 5 mg IM     Emergency Department Course:  1458 Nursing notes and vitals reviewed. I performed an exam of the patient as documented above.     The patient was placed on continuous pulse oximetry and cardiac monitoring while here in the ED.      IV inserted. Medicine administered as documented above. Blood drawn. This was sent to the lab for further testing, results above. The patient provided a urine sample here in the emergency department. This was sent for laboratory testing, findings above.     The patient was sent for a chest XR and cervical spine CT while in the emergency department, findings above.     1608 I consulted with Radiology regarding the patient's imaging results.     1643 I consulted with Dr. Carlton, Neurosurgery, regarding the patient's history and presentation here in the emergency department.    1647 I rechecked the patient and discussed the results of his workup thus far.     1701 I consulted with Dr. Cardenas, Hospitalist Services, regarding the patient's history and presentation here in the emergency department. They are in agreement for admission    Findings and plan explained to the Patient. Patient will be transferred to [facility] via EMS. Discussed the case with Dr. Cardenas, who will admit the patient to a monitored bed for further monitoring, evaluation, and treatment.    1940 I rechecked the patient. Patient with increasing agitation and likely sundowning. Also c/o the c collar  repeatedly. Haldol and zyprexa given.    Impression & Plan    Medical Decision Making:  J Luis Wolf is a 79 year old male who presents for evaluation for a fall three days ago as well as felling unwell. The patient came in a soft collar that was placed at [facility] several days ago. There is no midline tenderness. He does have some diffuse tenderness across the shoulder that is at the base of the cervical spine. Due to the injury and fall, I did do a CT which showed C1 and C2 fracture and likely an epidermal hematoma. Aspirin was not given even though he has elevated troponin due to the fact that he has epidural hematoma. He was switched to a hard collar and I talked to neurosurgery who will have him transferred to [facility]. I discussed this with his wife and she was comfortable. While patient was waiting for transfer here, he did start to get very uncomfortable with his collar. I did give him pain medications which seemed to not help. He became quite agitated and I believe this may be due to a component of sun downing. Haldol and Zyprexa were given with good results. The patient is now sleepy and less agitated. Patient will be transferred to [facility] for further evaluation and treatment.     Diagnosis:    ICD-10-CM    1. Elevated troponin R79.89 UA with Microscopic   2. Closed nondisplaced fracture of first cervical vertebra, unspecified fracture morphology, initial encounter (H) S12.001A    3. Closed displaced fracture of second cervical vertebra, unspecified fracture morphology, initial encounter (H) S12.100A      Disposition:  Transferred to [facility].      Scribe Disposition  IBekah, am serving as a scribe on 10/21/2019 at 3:34 PM to personally document services performed by Arnold Alarcon MD based on my observations and the provider's statements to me.     Bekah Barlow  10/21/2019   [facility] EMERGENCY DEPARTMENT       Arnold Alarcon MD  10/21/19 1019

## 2019-10-21 NOTE — ED TRIAGE NOTES
Patient has a history of pulmonary fibrosis. Has been on 5L oxygen continuous. Patient has had increased intermittent confusion for the past several. Fall on Friday; Neck and shoulder pain after fall. Has been more weak as well.

## 2019-10-21 NOTE — PROGRESS NOTES
Contacted by [facility] ED regarding 79 year old male who three days ago slid off a stool and fell onto his side. The next day he noted onset of neck pain. He presented to [facility] urgent care and had negative imaging workup. He was placed in a soft collar and was discharged. Today he presented to the ED with ongoing posterior neck pain. Per MD, neurologically intact with midline neck pain.    Cervical CT  IMPRESSION:  1. Acute type 2 odontoid fracture, mildly displaced.  2. Acute C1 burst fracture (Severino fracture equivalent).  3. Moderate epidural soft tissue density material at the level of C1,  likely a combination of pannus and epidural hematoma, resulting in  moderate spinal stenosis.  4. Multilevel degenerative disc disease, uncovertebral, and facet  arthropathy of the cervical spine, as detailed.     Discussed with Dr. Davis    -Recommend admission via hospitalist  -Continue cervical collar  -Bedrest  -May require halo  -Full consult to follow

## 2019-10-22 NOTE — PROVIDER NOTIFICATION
"Paged Dr. Dominguez, \"You okay for one time dose of 2.5mg zyprexa? He got 2.5mg at 0610. So wouldn't be able to give him the 5mg till 1800 today. \"  "

## 2019-10-22 NOTE — PHARMACY-ADMISSION MEDICATION HISTORY
Admission medication history interview status for the 10/21/2019  admission is complete. See EPIC admission navigator for prior to admission medications     Medication history source reliability:Moderate    Actions taken by pharmacist (provider contacted, etc):  Spoke w/ patient's wife, Edie.  Reviewed recent fill history through [facility].       Additional medication history information not noted on PTA med list :   - Patient is currently taking Prednisone 18 mg PO daily.      Medication reconciliation/reorder completed by provider prior to medication history? No    Time spent in this activity: 25 minutes    Prior to Admission medications    Medication Sig Last Dose Taking? Auth Provider   acetaminophen (TYLENOL) 500 MG tablet Take 500 mg by mouth every 6 hours as needed for mild pain  at prn Yes Unknown, Entered By History   alendronate (FOSAMAX) 70 MG tablet Take 1 tablet (70 mg) by mouth every 7 days 10/15/2019 Yes Edu Roberts MD   aspirin 81 MG EC tablet Take 81 mg by mouth daily  Yes Unknown, Entered By History   azelastine (ASTELIN) 137 MCG/SPRAY nasal spray [place] 1 spray into both nostrils daily as needed   at prn Yes Reported, Patient   benzonatate (TESSALON) 100 MG capsule Take 1 capsule (100 mg) by mouth 3 times daily as needed for cough  at prn Yes Nickie Millan MD   Calcium Citrate-Vitamin D (SM CALCIUM CITRATE+/VIT D3 PO) Take 1 tablet by mouth 2 times daily 630/500iu   Yes Reported, Patient   cetirizine (ZYRTEC) 10 MG tablet Take 10 mg by mouth daily  Yes Reported, Patient   cholecalciferol (VITAMIN D3) 1000 units (25 mcg) capsule Take 1 capsule by mouth daily  Yes Unknown, Entered By History   Dextromethorphan-Guaifenesin (MUCINEX DM PO) Take by mouth as needed   at prn Yes Reported, Patient   hydroxychloroquine (PLAQUENIL) 200 MG tablet Take 200 mg by mouth 2 times daily   Yes Unknown, Entered By History   losartan (COZAAR) 25 MG tablet Take 25 mg by mouth daily   Yes Reported, Patient    Multiple Vitamin (DAILY MULTIVITAMIN PO) Take 2 tablets by mouth every morning  Yes Reported, Patient   pantoprazole (PROTONIX) 40 MG EC tablet Take 40 mg by mouth every evening  Yes Unknown, Entered By History   predniSONE (DELTASONE) 1 MG tablet Take 3 mg by mouth daily Total dose = 18 mg daily.  Yes Unknown, Entered By History   predniSONE (DELTASONE) 10 MG tablet Take 10 mg by mouth daily Total dose = 18 mg  Yes Unknown, Entered By History   predniSONE (DELTASONE) 5 MG tablet Take 5 mg by mouth daily Total dose = 18 mg  Yes Unknown, Entered By History   sulfamethoxazole-trimethoprim (BACTRIM DS/SEPTRA DS) 800-160 MG tablet Take 1 tablet by mouth Every Mon, Wed, Fri Morning 400-80 10/21/2019 Yes Nickie Millan MD   order for DME Updated Oxygen: Patient requires supplemental Oxygen 6 LPM via nasal canula with activity and 4 LPM at rest and nocturnally. Please provide patient with a high flow home unit that goes to 10 LPM and with portability capability. Oxygen will be for a lifetime.   Nickie Millan MD Carolyn Dahlman, PharmD, BCPS

## 2019-10-22 NOTE — PROVIDER NOTIFICATION
Pt wife concerned of goals of care & having halo placed, asked wife if would be interested in Palliative consult & said yes.     Discussed with francisca Marquez to ordered palliative consult & place yap for retention. Yassine to discuss with Dr. Samuel & discuss with wife tomorrow.    Paged Palliative to try to schedule time to meet with patient wife. Sonia palliative, said to call her tomorrow if we know when neurosurgery will be by to meet with wife & neurosurgery.    Yassine called said Dr. Davis will be by between his cases, maybe around [phone]

## 2019-10-22 NOTE — PROVIDER NOTIFICATION
"Paged Yassine Mcintosh neurosurgery, \"411-1: Pt pupils minimally reactive (no hx of cataract sx). A little more restless/intermittent agitation, just gave additional dose of zyprexa. Any concern for neuro change/reimaging? \"    ADDENDUM: Yassine called back stating no reimaging needed at this time  "

## 2019-10-22 NOTE — PROVIDER NOTIFICATION
"Paged Dr. Dominguez, \"Pt currently NPO. Wondering if he would benefit from SLP eval once he no longer needs to be NPO. Has some garbled slp\"    OK for nursing to order SLP consult once neurosurgery clears for diet.   "

## 2019-10-22 NOTE — ED NOTES
Assisted MD in applying [place] collar.  Pt remains in C-spine precautions, however is extremely restless and does not lie still.  Frequent monitoring and redirection. Wife at bedside.

## 2019-10-22 NOTE — PROVIDER NOTIFICATION
Paged Dr. Dominguez, Pt RR 32. Needs 6L O2, baseline 5L. Denies SOB. Tachycardic 100-115 & some HTN today. Called RT for neb. SLP still recommends NPO. Would he benefit from steroids? Continues to be restless. Has not slept at all today/last night. Please advise'    Tylenol given & waiting for RT to give neb.    Dr. Dominguez aware, Solumedrol ordered/given. Neb given. Some improvement in RR.

## 2019-10-22 NOTE — PLAN OF CARE
PT: Orders received, chart reviewed, pt adm 10/21/19 with C1-C2 fractures, currently on bedrest with cervical collar with plan for surgical intervention in the next 24-48 hours per neurosurgery. Will defer PT at this time.

## 2019-10-22 NOTE — PROVIDER NOTIFICATION
"Paged Yassine Lockhart, \"Wife tearful at bedside & unsure about having brace placed tomorrow. Would you be available to come talk to her?\"  "

## 2019-10-22 NOTE — CONSULTS
Consult Date:  10/21/2019      REASON FOR CONSULTATION:  The Neurosurgical service was contacted to see Mr. Wolf for cervical fracture.       ASSESSMENT:  Pleasant 79-year-old male presenting with cervical pain after a fall 3 days ago with a cervical CT revealing an acute type 2 odontoid fracture which is mildly displaced along with an acute C1 burst fracture.  It should be noted that Radiology comments on a moderate epidural soft tissue density material at the level of C1, likely a combination of pannus and epidural hematoma results in moderate spinal stenosis.      PLAN:  From the neurosurgical standpoint, he should remain on bed rest with a cervical hard collar.  Most likely plan will be for a halo placement in the next 24-48 hours.  We will also get a head CT to rule out any acute intracranial pathology.  We may want to consider a cervical MRI to look at ligamentous injury or other pathology in the cervical spine, however, the patient is highly claustrophobic and is unknown whether this will be obtainable, but for this evening, bed rest and cervical hard collar.  Neuro checks q.2h.        HISTORY OF PRESENT ILLNESS:  Mr. Wolf is a 79-year-old left-handed male with a past medical history of interstitial lung disease on chronic steroids, rheumatoid arthritis, osteoporosis, hypertension, left eye central retinal artery occlusion with punctate right occipital stroke in 06/2019 treated with aspirin and Plavix for a total of 3 months.  Currently, just on aspirin, who presents as a transfer from [facility] for a cervical fracture.  Mr. Wolf is quite sleepy during the exam apparently became fidgety and increased altered mental status after given Dilaudid at [facility] so most of the history is taken from his wife at bedside.  She states that on Friday 10/18 he took a fall at home.  He fell off of a stool.  He did not lose consciousness.  He was able to get up afterwards but the following day on  10/19 he complained of significant posterior cervical pain so they went to the urgent care at [facility].  Wife states he had an x-ray that was negative and he was given a soft collar and discharged.  Sunday, 10/20, the patient's wife states he was just not himself.  He seemed more confused.  He was not walking as well as he normally does.  Normally he ambulates with a cane and has chronic right leg pain and weakness due to a radiculopathy but he was not getting around like he normally was.  They had a regularly scheduled primary care appointment and when they went to the appointment this afternoon, his primary care provider, which he has had for some time thought that the patient did not look right and deserves further workup and recommended that they present to the emergency room/  They presented to [facility] where a cervical CT revealed an acute odontoid fracture as well as a Severino fracture.  Neurosurgery was consulted and it was recommended that he be transferred to [facility].  Currently, patient states his neck pain is improved compared to earlier; however, it should be noted he is somewhat confused on examination.      PAST MEDICAL HISTORY:  Interstitial lung disease, on chronic steroids, rheumatoid arthritis, hypertension, left eye central retinal artery occlusion in 06/2019, as well as left occipital stroke treated with 3 months of aspirin and Plavix, osteoporosis, hypertension.      PAST SURGICAL HISTORY:  He has had an appendectomy, liver biopsy, bunionectomy, right elbow surgery.      SOCIAL HISTORY:  He is a retired .  He is  and lives at home with his wife.  They have 2 children.  Consumes alcohol socially.  He states he is left handed.  Cigarette history of 5 years, 1957 to 1962.      MEDICATIONS:  Reviewed per the electronic medical record.  No anticoagulation other than aspirin.      ALLERGIES:  FLU VACCINE AND NUTS.      PHYSICAL  EXAMINATION:   VITAL SIGNS:  Blood pressure 141/88.  The sats 97% on I believe 5 liters.  Pulse is 130, respiratory rate is 22.   GENERAL:  He is awake upon entering the room and is transferred from the EMS bed to the regular bed to the hospital bed.  He is oriented to place and person, but not time.  He seems pleasantly confused during the conversation and will fall asleep during the exam.  He is easily arousable.  He has an [place] hard collar in place.  He has pain on palpation of the posterior cervical spine.   NEUROLOGIC:  Strength testing difficult as the patient is having a hard time following commands.  When we asked him to lift his arms up in the air, he will take his right hand and put his left arm up, but he is able to hold both arms up independently.  He is able to lift both legs up independently.  .  With persistent testing, it is believed that his hand , bicep and tricep strength is 4+/5.  Lower extremities he can lift both legs up off the bed hold them up for several seconds, but he has a very difficult time cooperating with iliopsoas and quadriceps testing, but he is overall neurologically intact.      IMAGING STUDIES:  Include a CT of the cervical spine earlier today at [facility].  He has a mildly comminuted fracture involving the anterior arch of C1, as well as displaced fractures in the C1 lamina bilaterally.  There is a mildly displaced type 2 dens fracture with a little bit of posterior displacement of the dens fracture fragment with respect to the base of the C2.  He also has mild posterior subluxation of the lateral mass of C1 with respect to C2. Radiology comments on a moderate epidural soft tissue density material at the level of C1, likely a combination of pannus and epidural hematoma results in moderate spinal stenosis     LABORATORY DATA:  Reveal a normal INR.  White count is 7.2, platelets 159, hemoglobin 11.4.  Troponin elevated at 0.055.         Total time spent in the patient  70 minutes, including 50 minutes of counseling and coordination of care.  I discussed with Dr. Valle.         SADIE VALLE MD       As dictated by LEORA FALCON            D: 10/22/2019   T: 10/22/2019   MT: TRENT      Name:     BOUCHRA DOMINGUEZ   MRN:      [id number]        Account:       [id number]   :      1940           Consult Date:  10/21/2019      Document: [id number]

## 2019-10-22 NOTE — H&P
Admitted:     10/21/2019      PRIMARY CARE PROVIDER:  Mario Foster DO      CHIEF COMPLAINT:  Fall.      HISTORY OF PRESENT ILLNESS:  The history is obtained predominantly with my discussion with the spouse at the bedside.  The patient is somewhat confused and not able to fully participate in the history.      J Luis Wolf is a 79-year-old male with history of chronic pulmonary fibrosis with chronic respiratory failure on 5 liters oxygen at baseline, who presents from home after he sustained a fall 3 days ago.  The initial fall happened on 10/18/2019.  The patient was apparently sitting on a stool by bedside when he slid off and fell to the side and landed on his neck area.  He did not lose consciousness at that time and apparently felt fine initially.  The next day, patient started having neck pain and he went to [facility] and had a negative initial imaging  He apparently had an x-ray at that time and was placed on a soft C-collar and was discharged home.  Overnight, however, the patient was unable to rest well and continued to complain of ongoing pain and not feeling like himself prompting the ED visit at [facility].  At [facility] he had a CT cervical spine which showed an acute type 2 odontoid fracture which was mildly displaced along with acute C1 burst fracture and moderate epidural soft tissue density material at the level of C1, likely a combination of an epidural hematoma resulting in moderate spinal stenosis.  The situation was discussed with Neurosurgery and they recommended transfer to [facility].  Neurosurgery is also recommending continuing cervical collar at this time, bed rest and he might require halo.      As far as further review of system is concerned, there is no reported chest pain or dyspnea worse from baseline.  The wife has noticed, however, lately has been having some memory problems and actually since the fall 3 days  ago he has not quite been himself.  No nausea, vomiting or diarrhea.  No fever or chills.  No dysuria, urinary urgency or frequency; however, the wife has noticed that his urine is much darker in color.      Of note, the patient also complained of pain at [facility] and he got IV Dilaudid after he became quite agitated and confused.  He subsequently received Haldol and Zyprexa and apparently it made him much calmer.      PAST MEDICAL HISTORY:   1.  Pulmonary fibrosis.   2.  Chronic respiratory failure on 5 liters oxygen at baseline.   3.  Recent left central retinal artery occlusion.   4.  Cerebrovascular accident.   5.  Rheumatoid arthritis.   6.  Benign essential hypertension.      SOCIAL AND PERSONAL HISTORY:  The patient lives with his wife.  Former smoker, drinks alcohol socially, no recreational drug use.      FAMILY HISTORY:  Reviewed and is noncontributory.      HOME MEDICATIONS:    Prior to Admission Medications   Prescriptions Last Dose Informant Patient Reported? Taking?   Calcium Citrate-Vitamin D (SM CALCIUM CITRATE+/VIT D3 PO)  Spouse/Significant Other Yes Yes   Sig: Take 1 tablet by mouth 2 times daily 630/500iu    Dextromethorphan-Guaifenesin (MUCINEX DM PO)  at prn Spouse/Significant Other Yes Yes   Sig: Take by mouth as needed    Multiple Vitamin (DAILY MULTIVITAMIN PO)  Spouse/Significant Other Yes Yes   Sig: Take 2 tablets by mouth every morning   acetaminophen (TYLENOL) 500 MG tablet  at prn Spouse/Significant Other Yes Yes   Sig: Take 500 mg by mouth every 6 hours as needed for mild pain   alendronate (FOSAMAX) 70 MG tablet 10/15/2019 Spouse/Significant Other No Yes   Sig: Take 1 tablet (70 mg) by mouth every 7 days   aspirin 81 MG EC tablet  Spouse/Significant Other Yes Yes   Sig: Take 81 mg by mouth daily   azelastine (ASTELIN) 137 MCG/SPRAY nasal spray  at prn Spouse/Significant Other Yes Yes   Sig: Spray 1 spray into both nostrils daily as needed    benzonatate (TESSALON) 100  MG capsule  at prn Spouse/Significant Other No Yes   Sig: Take 1 capsule (100 mg) by mouth 3 times daily as needed for cough   cetirizine (ZYRTEC) 10 MG tablet  Spouse/Significant Other Yes Yes   Sig: Take 10 mg by mouth daily   cholecalciferol (VITAMIN D3) 1000 units (25 mcg) capsule  Spouse/Significant Other Yes Yes   Sig: Take 1 capsule by mouth daily   hydroxychloroquine (PLAQUENIL) 200 MG tablet  Spouse/Significant Other Yes Yes   Sig: Take 200 mg by mouth 2 times daily    losartan (COZAAR) 25 MG tablet  Spouse/Significant Other Yes Yes   Sig: Take 25 mg by mouth daily    order for DME  Spouse/Significant Other No No   Sig: Updated Oxygen: Patient requires supplemental Oxygen 6 LPM via nasal canula with activity and 4 LPM at rest and nocturnally. Please provide patient with a high flow home unit that goes to 10 LPM and with portability capability. Oxygen will be for a lifetime.   pantoprazole (PROTONIX) 40 MG EC tablet  Spouse/Significant Other Yes Yes   Sig: Take 40 mg by mouth every evening   predniSONE (DELTASONE) 1 MG tablet  Spouse/Significant Other Yes Yes   Sig: Take 3 mg by mouth daily Total dose = 18 mg daily.   predniSONE (DELTASONE) 10 MG tablet  Spouse/Significant Other Yes Yes   Sig: Take 10 mg by mouth daily Total dose = 18 mg   predniSONE (DELTASONE) 5 MG tablet  Spouse/Significant Other Yes Yes   Sig: Take 5 mg by mouth daily Total dose = 18 mg   sulfamethoxazole-trimethoprim (BACTRIM DS/SEPTRA DS) 800-160 MG tablet 10/21/2019 Spouse/Significant Other No Yes   Sig: Take 1 tablet by mouth Every Mon, Wed, Fri Morning 400-80      Facility-Administered Medications: None         REVIEW OF SYSTEMS:  A complete review of system was done and was negative for anything else other than that mentioned in the HPI.      PHYSICAL EXAMINATION:   GENERAL:  Mr. Wolf is a 79-year-old male.  He is not in any overt distress.  He does have a cervical collar in place.   VITAL SIGNS:  Temperature is 98.1, blood  pressure 132/72, a heart rate of 101, respiration rate 30, O2 sat 91%.   HEENT:  Positive for cervical collar in place.  He does not have any other external evidence of injury in the face or the forehead area.   RESPIRATORY:  Crackles bilaterally with normal effort of breathing.   CARDIOVASCULAR:  Regular rate and rhythm, no murmur.   GASTROINTESTINAL:  Abdomen is soft and nontender, bowel sounds normoactive.   EXTREMITIES:  No edema, cyanosis, clubbing.   SKIN:  Warm and dry.   NEUROLOGIC:  He does appear a little confused.  He does follow simple commands and he was able to tell me his date of birth, but his speech is a little difficult to understand at this time.  He appears to be moving all 4 extremities, though.      LABORATORY AND DIAGNOSTIC DATA:  Normal sodium and potassium.  Bicarbonate is more than 45.  Troponin 0.055.  Hemoglobin 11.4.  UA is negative.  CT cervical spine as mentioned above.  A 12-lead electrocardiogram showing sinus rhythm with premature ventricular complex, right bundle branch block.      ASSESSMENT AND PLAN:  Mr. Gonzales Wolf is a 79-year-old male with history of interstitial lung disease with chronic respiratory failure on 5 liters oxygen, who presented to [facility] 3 days after a fall and is found to have unstable odontoid and C1 burst fracture.   1.  Acute type 2 odontoid fracture, mildly displaced.   2.  Acute C1 burst fracture.   3.  Moderate epidural hematoma after a mechanical fall.  The patient sustained a mechanical fall 3 days ago and had worsening back pain and presented to the emergency room at [facility] where a CT cervical spine revealed the above fractures and epidural hematoma.   The patient was initially discussed on the phone by Neurosurgery and has already been evaluated by Neurosurgery Service after arriving here.  The plan is to continue cervical collar, bed rest, and he may require a halo.  Since he did not have CT head done at this time we will send him  down for a stat CT head.   4.  Acute encephalopathy.  The patient apparently was awake, alert and oriented when he initially presented to [facility]; however, after receiving Dilaudid he got confused and agitated and needed Zyprexa and Haldol.  More than likely his acute encephalopathy was exacerbated by narcotics; however, for completeness, will get a CT head as mentioned above.  We will provide him 1:1 sitter and monitor him closely.   5.  Interstitial lung disease with chronic respiratory failure.  At baseline, he uses oxygen at 5 liters and this appears to be at baseline without any active issues.  I will place him on DuoNebs p.r.n.   6.  Central retinal artery occlusion.   Prior The patient does have a history of cerebrovascular accident and also central retinal artery occlusion.  He appears to be on aspirin and Plavix for this.  This will be held given the traumatic epidural hematoma and unstable fracture until we received clearance from Neurosurgery regarding ongoing antiplatelet use.   7.  Rheumatoid arthritis.  We will continue prior to admission Plaquenil and prednisone once the dose of prednisone is verified by the pharmacy.      Anticipated length of stay was  more than 2 midnights.         WAYLON HENDRICKS MD             D: 10/22/2019   T: 10/22/2019   MT: TRENT      Name:     BOUCHRA DOMINGUEZ   MRN:      [id number]        Account:      [id number]   :      1940        Admitted:     10/21/2019                   Document: [id number]       cc: Mario Foster DO

## 2019-10-22 NOTE — H&P
H/P dictated.  Tropnenimia appears chronic. Will trend for now. Likely due to demand ischemia/ chronic resp failure

## 2019-10-22 NOTE — PROGRESS NOTES
10/22/19 1451   General Information   Onset Date 10/21/19   Start of Care Date 10/22/19   Referring Physician Angelika Dominguez MD   Patient Profile Review/OT: Additional Occupational Profile Info See Profile for full history and prior level of function   Patient/Family Goals Statement None stated    Swallowing Evaluation Bedside swallow evaluation   Behaviorial Observations Alert;Confused;Distractible;Impulsive   Mode of current nutrition NPO   Respiratory Status O2 Supply   Type of O2 supply Nasal cannula   Comments Hospital day 2, patient with acute type II odontoid fracture as well as an acute C1 burst fracture, sustained after a fall at home..  He is currently in a collar. Pt and his wife deny that he has a dysphagia hx. Pt is very confused, distractible and anxious during evaluation    Clinical Swallow Evaluation   Oral Musculature generally intact   Structural Abnormalities none present   Dentition present and adequate   Mucosal Quality dry   Oral Labial Strength and Mobility impaired coordination   Lingual Strength and Mobility impaired coordination  (impaired strength )   Velar Elevation intact   Laryngeal Function Cough;Swallow;Voicing initiated   Additional Documentation Yes   Swallow Eval   Feeding Assistance dependent   Clinical Swallow Eval: Thin Liquid Texture Trial   Mode of Presentation, Thin Liquids straw;spoon;fed by clinician   Volume of Liquid or Food Presented 2 ice chips, 1 teaspoon of water, 2 straw sips of water    Oral Phase of Swallow Poor AP movement;Premature pharyngeal entry;Residue in oral cavity   Oral Residue   (Sputtering with liuqids )   Pharyngeal Phase of Swallow impaired;coughing/choking;no awareness of problems;reduction in laryngeal movement;repeated swallows;throat clearing   Diagnostic Statement Gross tolerance of straw sip x 1, significant incoordination, sputtering and coughing with secondary trial primarily related to cognition and impaired coordination    Clinical  Swallow Eval: Puree Solid Texture Trial   Mode of Presentation, Puree spoon;fed by clinician   Volume of Puree Presented 2 teaspoons    Oral Phase, Puree Poor AP movement;Premature pharyngeal entry;Residue in oral cavity   Oral Residue, Puree mid posterior tongue;left anterior lateral sulci;right anterior lateral sulci   Pharyngeal Phase, Puree impaired;no awareness of problems;reduction in laryngeal movement;repeated swallows   Diagnostic Statement Impaired coordination, reduced hyolaryngeal excursion. Effortful pharyngeal swallow and potential pharyngeal residue    Esophageal Phase of Swallow   Patient reports or presents with symptoms of esophageal dysphagia No   General Therapy Interventions   Planned Therapy Interventions Dysphagia Treatment   Dysphagia treatment Oropharyngeal exercise training;Modified diet education;Instruction of safe swallow strategies   Swallow Eval: Clinical Impressions   Skilled Criteria for Therapy Intervention Skilled criteria met.  Treatment indicated.   Functional Assessment Scale (FAS) 2   Treatment Diagnosis Moderate-severe oropharyngeal dysphagia   Diet texture recommendations NPO   Therapy Frequency Daily   Predicted Duration of Therapy Intervention (days/wks) 1 week    Anticipated Discharge Disposition extended care facility   Risks and Benefits of Treatment have been explained. Yes   Patient, family and/or staff in agreement with Plan of Care Yes   Clinical Impression Comments Pt seen for clinical swallow evaluation. He is alert, but very confused pulling at things in the room, reaching for things, attempting to pull his nasal cannula out. Pt and his wife deny any dysphagia hx. Oral mech is grossly within functional limits. Mild reduction in strength, very poor coordination likely secondary to confusion. Pt was assessed wtih ice chips, thin liquids and puree. Disorganized bolus manipulation and impaired coordination - suspect premature spillage to the pharynx and reduced  control. Fluctuating timing of swallow initiation and reduced hyolaryngeal excursion. Suspect pharyngeal residue with all trials. Pt initially tolerated a sip of thin liquids, however an additional trial resulted in sputtering and an overt cough response and aspiration is supsected. This is felt primarily related to reduced oropharyngeal control and cognition. Recommend NPO status d/t high risk for aspiration. Dysphagia secondary to current mentation/confusion, resulting in significant oropharyngeal incoordination. Only necessary oral meds in very small amount of puree, ice chips when alert and sitting up in moderation with RN present.    Total Evaluation Time   Total Evaluation Time (Minutes) 20

## 2019-10-22 NOTE — PROGRESS NOTES
[facility]    Neurosurgery  Daily Note    Assessment & Plan   Hospital day 2, patient with acute type II odontoid fracture as well as an acute C1 burst fracture, sustained after a fall at home..  He is currently in a collar.  Current plan is for halo fixation in the OR with Dr. Davis tomorrow.  N.p.o. after midnight    History of a right occipital stroke in June of this year, was treated with aspirin and Plavix for 3 months.    Yassine Mcintosh    Interval History      StablePhysical Exam   Temp: 98.2  F (36.8  C) Temp src: Oral BP: (!) 148/91   Heart Rate: 58 Resp: 20 SpO2: 97 % O2 Device: Nasal cannula Oxygen Delivery: 5 LPM  Vitals:    10/21/19 2244   Weight: 55.3 kg (122 lb)     Vital Signs with Ranges  Temp:  [97  F (36.1  C)-98.2  F (36.8  C)] 98.2  F (36.8  C)  Pulse:  [] 101  Heart Rate:  [] 58  Resp:  [10-32] 20  BP: ()/() 148/91  SpO2:  [91 %-99 %] 97 %  No intake/output data recorded.    Alert and follows commands, is frequently agitated and restless, as well as confused.      Medications     dextrose 5% and 0.9% NaCl 75 mL/hr at 10/22/19 1122          Data       Yassine Mcintosh PA-C  [facility]

## 2019-10-22 NOTE — PROVIDER NOTIFICATION
"Paged Dr. Dominguez, \"BG 66, do you want D5 IV fluid? LEORA Metzger neurosurgery said to remain NPO for now & will let us know when ok to have diet.\"  "

## 2019-10-22 NOTE — CONSULTS
"CLINICAL NUTRITION SERVICES  -  ASSESSMENT NOTE      Future/Additional Recommendations:     Once diet initiated, will plan to send high al/pro supplements for nutrition push     Malnutrition:   % Weight Loss:  > 10% in 6 months (severe malnutrition)  % Intake:unable to assess (pt with confusion and didn't think his intake had been decreased)  Subcutaneous Fat Loss:  None observed  Muscle Loss:  Acromion bone region  - moderate depletion, Patellar region  - moderate depletion, Anterior thigh region  - moderate depletion and Posterior calf region  - moderate depletion  Fluid Retention:  None noted    Malnutrition Diagnosis: Severe malnutrition  In Context of:  Acute on Chronic illness or disease         REASON FOR ASSESSMENT  J Luis Wolf is a 79 year old male seen by Registered Dietitian for Admission Nutrition Risk Screen for reduced oral intake over the last month      NUTRITION HISTORY  Chart reviewed  Stopped by to see pt this morning  Was able to obtain a partial diet history - pt with some confusion and grabbing at the ceiling  Breakfast - makes himself eggs and acosta, coffee, juice  Lunch - makes his own lunch, sandwich/soup/leftovers, can of beer  Dinner - wife makes dinner    Unable to tell me if he has been eating less  Didn't know if he had lost any wt      CURRENT NUTRITION ORDERS  Diet Order:     NPO    States he is hungry    Neurosurgery said to remain NPO for now & will let us know when ok to have diet      NUTRITION FOCUSED PHYSICAL ASSESSMENT FOR DIAGNOSING MALNUTRITION)  Completed:  Yes Full assessment         Observed:    Muscle wasting (refer to documentation in Malnutrition section)   Collar     Obtained from Chart/Interdisciplinary Team:  Gardner collar   No edema       ANTHROPOMETRICS  Height: 5'7\"  Weight:(10/21) 55.3 kg / 122 lbs 0 oz  Body mass index is 19.11 kg/m .  Weight Status:  Normal BMI  IBW: 67.3 kg  % IBW: 82%  Weight History: Wt is down 10# (7%) in the past 2 months, and down " ~20# (13%) in the past 6 months.  Wt Readings from Last 10 Encounters:   10/21/19 55.3 kg (122 lb)   08/16/19 59.9 kg (132 lb)   07/22/19 60.4 kg (133 lb 3.2 oz)   04/23/19 63.6 kg (140 lb 3.2 oz)   04/19/19 64.2 kg (141 lb 8 oz)   12/07/18 68 kg (150 lb)   11/06/18 66.1 kg (145 lb 11.2 oz)   08/22/18 67.2 kg (148 lb 1.6 oz)   05/10/18 68.9 kg (152 lb)   05/09/18 68.9 kg (152 lb)         LABS  Labs reviewed    MEDICATIONS  IVF @ 75 mL/hr      ASSESSED NUTRITION NEEDS PER APPROVED PRACTICE GUIDELINES:    Dosing Weight 55.3 kg  Estimated Energy Needs: 7212-4632 kcals (30-35 Kcal/Kg)  Justification: underweight  Estimated Protein Needs: 65-85 grams protein (1.2-1.5 g pro/Kg)  Justification: Repletion  Estimated Fluid Needs: 9577-9465  mL (1 mL/Kcal)  Justification: maintenance    MALNUTRITION:  % Weight Loss:  > 10% in 6 months (severe malnutrition)  % Intake:unable to assess (pt with confusion and didn't think his intake had been decreased)  Subcutaneous Fat Loss:  None observed  Muscle Loss:  Acromion bone region  - moderate depletion, Patellar region  - moderate depletion, Anterior thigh region  - moderate depletion and Posterior calf region  - moderate depletion  Fluid Retention:  None noted    Malnutrition Diagnosis: Severe malnutrition  In Context of:  Acute on Chronic illness or disease    NUTRITION DIAGNOSIS:  Inadequate protein-energy intake related to NPO status as evidenced by pt meeting 0% est needs      NUTRITION INTERVENTIONS  Recommendations / Nutrition Prescription  NPO (diet per MD)  Nutrition supplements  .      Implementation  Nutrition education: Not appropriate at this time due to patient condition  Once diet initiated, will plan to send high al/pro supplements for nutrition push  .      Nutrition Goals  Pt to receive nutrition in the next 48 hrs  .      MONITORING AND EVALUATION:  Progress towards goals will be monitored and evaluated per protocol and Practice Guidelines

## 2019-10-22 NOTE — PROVIDER NOTIFICATION
"MD Notification    Notified Person: DO    Notified Person Name: Carolyn Dinero DO    Notification Date/Time: 10/22/19 at 0133    Notification Interaction: web-based page    Purpose of Notification: \"741-1 - RH - FYI Trop levels increasing. Currently 0.074. Next trop level draw at 0400. GAGE Scott *[id number]\"    Orders Received: Continue with q4h trop levels, monitor, and give PRN Tylenol suppository for comfort    Comments: PRN rectal Tylenol now ordered    "

## 2019-10-22 NOTE — PROGRESS NOTES
"10/22/19, 10AM, [place] [address] [facility] [id number], male, Height: 67\", Weight: 122 lbs 0 oz, Dx: Odontoid fracture (S12.110A), MRN #:[id number].    S:   Patient was seen today present for measurement of the halo ring for the application procedure that will occur tomorrow (10/23/19 at approximately 1PM but is prone to change).  Patient is currently wearing an [place] collar at the time of my encounter today.    O:   Patient vocalizes discomfort in neck pain at this time and was measured while lying supine.    A:   Patient's cranial circumference was measured at 22\". Patient falls under the medium ring parameters but a small, medium and large ring; Medium & large vests; range blocks; traction bail; torque wrenches were ordered to be available at the [place] office for tomorrow's procedure.    P:   Patient is to be seen tomorrow for the halo application. Will follow-up PRN.    Electronically signed by Abdirahman Fernandez , LPO, MSPO.  "

## 2019-10-22 NOTE — PROVIDER NOTIFICATION
"Paged Dr. Dominguez, \"FYI troponin continue to trend up slightly. Looks like recheck at 1000. Thanks\"  "

## 2019-10-22 NOTE — PLAN OF CARE
Pt here with acute type 2 odontoid fracture with C1 burst fracture and Severino fracture secondary to fall. A&Ox3, disoriented to time. Neuros q2h. Slight left field cut noted, otherwise patient able to follow commands, although sometimes slowly. Slightly garbled/incomprehensible speech d/t collar.  No WFD. Tele SD w/ PACs, intermittently tachy. Otherwise VSS on 5L O2 (baseline). NPO diet, oral cares performed q2h. Cervical precautions, aspen collar worn at all times. Sitter at bedside d/t pt pulling at tubes/collar. Bedrest. Sukhjinder trending up, MD aware. Plan for ortho consult this AM, possibly for halo. Continue with frequent checks and monitoring.

## 2019-10-22 NOTE — PROGRESS NOTES
[facility]    Hospitalist Progress Note      Assessment & Plan   J Luis Wolf is a 79 year old male with history of interstitial lung disease with chronic respiratory failure on 5 liters oxygen, who presented to [facility] 3 days after a fall and is found to have unstable odontoid and C1 burst fracture.      Acute type 2 odontoid fracture, mildly displaced.    Acute C1 burst fracture.    Moderate epidural hematoma after a mechanical fall.    The patient sustained a mechanical fall 3 days ago and had worsening back pain and presented to the emergency room at [facility] where a CT cervical spine revealed the above fractures and epidural hematoma. CT head negative for acute intracranial process.   - neurosurgery eval on 10/21, recommend cervical hard collar, and halo placement  - neurosurgery following    Acute encephalopathy.    The patient apparently was awake, alert and oriented when he initially presented to [facility]; however, after receiving Dilaudid he got confused and agitated and needed Zyprexa and Haldol.  More than likely his acute encephalopathy was exacerbated by narcotics and now ongoing hospitalization.    - tylenol for pain control  - increase zyprexa to 5mg BID prn  - bedside attendant, move bed to window, frequent reorienation  - will obtain speech eval once able to eat    Interstitial lung disease with chronic respiratory failure.  At baseline, he uses oxygen at 5 liters and this appears to be at baseline without any active issues.     Central retinal artery occlusion.   Hx of CVA  He appears to be on aspirin and Plavix for this.  This will be held given the traumatic epidural hematoma and unstable fracture until we received clearance from Neurosurgery regarding ongoing antiplatelet use.     Rheumatoid arthritis.    - continue prior to admission Plaquenil and prednisone once no longer NPO    Trop elevation:   Trop 0.055-0.074-0.111. He denies chest pain. EKG shows  RBBB, sinus rhythm. No acute ischemic changes. Trop also previously elevated. Last echo in 4/2019 which showed normal EF of 55-60%, LVH, no wall motion abnormality  - will trend  Addendum: trop 0.136, another one due at 1400. Will check echocardiogram   addendum: repeat trop 0.162, given increasing trop. Will ask cardiology input    ADDENDUM:   Discussed with patient's wife. She feels his quality of life is not great. She is not sure if she wants to proceed with Halo placement by neurosurgery and meeting with the team to discuss further. Discussed with her regarding troponin elevation and limited ability to give him antiplatelet given concern for epidural hematoma. At this time, we discussed about deferring cardiology consultation, cardiac meds once able to tolerate PO  - cancelled cards consult  - echo reviewed normal EF, no wall motion abnormality. Rhythm noted to be rapid A.fib (new, ekg on admit was sinus)  - Metoprolol 2.5mg QID and prn for sustained HR >120  - EKG to confirm rhythm  - no anticoagulation in light of above      Communications: discussed with RN    DVT Prophylaxis: Low Risk/Ambulatory with no VTE prophylaxis indicated and Pneumatic Compression Devices  Code Status: DNR/DNI    Disposition: Expected discharge in 2-3 days, will likely need placement    Angelika Dominguez MD  Text Page  (7am to 6pm)    Interval History   He is quite restless in bed, but easily redirectable. He denies chest pain or shortness of breath.  Does complain of neck pain.  He states the collar is making his speech difficult and hard to understand certain things he is saying.     -Data reviewed today: I reviewed all new labs and imaging results over the last 24 hours. I personally reviewed the head CT image(s) showing as mentioned above.    Physical Exam   Temp: 98.2  F (36.8  C) Temp src: Oral BP: (!) 148/91   Heart Rate: 58 Resp: 20 SpO2: 97 % O2 Device: Nasal cannula Oxygen Delivery: 5 LPM  Vitals:    10/21/19 2244    Weight: 55.3 kg (122 lb)     Vital Signs with Ranges  Temp:  [97  F (36.1  C)-98.2  F (36.8  C)] 98.2  F (36.8  C)  Pulse:  [] 101  Heart Rate:  [] 58  Resp:  [10-32] 20  BP: ()/() 148/91  SpO2:  [91 %-99 %] 97 %  No intake/output data recorded.    Constitutional: Alert, awake, restless,but easily redirectable  Respiratory: Clear to ausculation bilaterally, no wheezing or rhonchi  Cardiovascular: regular rate and rhythm  GI: soft and non-tender  Skin/Integumen:       Medications     sodium chloride 75 mL/hr at 10/22/19 0013         Data   Recent Labs   Lab 10/22/19  0718 10/22/19  0423 10/22/19  0019 10/21/19  1522   WBC  --  8.6  --  7.7   HGB  --  11.8*  --  11.4*   MCV  --  102*  --  104*   PLT  --  155  --  159   INR  --   --   --  0.89   NA  --  142  --  137   POTASSIUM  --  4.0  --  4.2   CHLORIDE  --  95  --  94   CO2  --  43*  --  >45*   BUN  --  20  --  21   CR  --  0.62*  --  0.60*   ANIONGAP  --  4  --  Not Calculated   SILVINO  --  9.4  --  9.6   GLC  --  68*  --  122*   ALBUMIN  --   --   --  3.3*   PROTTOTAL  --   --   --  6.4*   BILITOTAL  --   --   --  0.4   ALKPHOS  --   --   --  51   ALT  --   --   --  33   AST  --   --   --  33   TROPI 0.111* 0.090* 0.074* 0.055*       Recent Results (from the past 24 hour(s))   CT Cervical Spine w/o Contrast   Result Value    Radiologist flags Acute cervical spine fracture (AA)    Narrative    CT CERVICAL SPINE WITHOUT CONTRAST   10/21/2019 4:03 PM     HISTORY: Fall.     TECHNIQUE: Axial images of the cervical spine were obtained without  intravenous contrast. Multiplanar reformations were performed.   Radiation dose for this scan was reduced using automated exposure  control, adjustment of the mA and/or kV according to patient size, or  iterative reconstruction technique.    COMPARISON: None.    FINDINGS:  There is a Severino fracture equivalent, with mildly  comminuted fracture involving the anterior arch of C1 as well as  displaced  fractures involving the bilateral C1 laminae. There is a  mildly displaced type II dens fracture, with mild posterior  displacement of the dens fracture fragment with respect to the base of  C2. Mild posterior subluxation of the lateral masses of C1 with  respect to the C2 lateral masses; ligamentous injury cannot be  excluded.    No other acute fractures of the cervical spine are identified. There  is straightening of the normal cervical lordosis, with trace  retrolisthesis of C3 on C4. The facet joints appear congruent  otherwise. There is a background of multilevel degenerative disc  disease, with most pronounced disc height loss at C5-C6. Multilevel  uncovertebral and facet arthrosis is also present. There is a large  amount of abnormal soft tissue centered about the dens,  circumferentially seen in the epidural space surrounding the cervical  cord at the level of C2 (series 3 image 28). This likely represents a  combination of soft tissue pannus and epidural hematoma. There is  moderate associated spinal canal stenosis at that location.    Varying degrees of spinal canal stenosis seen elsewhere, including at  least moderate stenosis at C3-C4, C4-C5 and C5-C6 related to disc  bulges/herniations with associated posterior endplate osteophytic  ridging, uncovertebral arthropathy and facet arthropathy at those  levels. Varying degrees of neural foraminal stenosis also present,  most pronounced on the right at C4-C5.    Multiple punctate nonobstructing parotid sialoliths are present.  Bilateral carotid bifurcation atherosclerotic calcifications are seen.  There appears to be some scarring in the lung apices bilaterally.      Impression    IMPRESSION:  1. Acute type 2 odontoid fracture, mildly displaced.  2. Acute C1 burst fracture (Severino fracture equivalent).  3. Moderate epidural soft tissue density material at the level of C1,  likely a combination of pannus and epidural hematoma, resulting in  moderate spinal  stenosis.  4. Multilevel degenerative disc disease, uncovertebral, and facet  arthropathy of the cervical spine, as detailed.    [Critical Result: Acute cervical spine fracture]    Finding was identified on 10/21/2019 4:05 PM.     Dr. Alarcon was contacted by me on 10/21/2019 4:08 PM and verbalized  understanding of the critical result.      MARTHA HALL MD   XR Chest 2 Views    Narrative    XR CHEST TWO VIEWS   10/21/2019 4:14 PM     HISTORY: Shortness of breath    COMPARISON: Chest CT on 7/22/2019      Impression    IMPRESSION: Diffuse bilateral subpleural predominant interstitial  pulmonary opacities, consistent with patient's history of interstitial  pulmonary disease. Although no definite new focal pulmonary opacities,  difficult to completely exclude superimposing infection given the  existing extensive pulmonary opacities.    NICKOLAS GROVE MD   CT Head w/o Contrast    Narrative    EXAM: CT HEAD W/O CONTRAST  LOCATION: [facility]  DATE/TIME: 10/21/2019 11:32 PM    INDICATION: Fall; pain, possible fracture.  COMPARISON: Cervical spine CT 10/21/2019 described separately.  TECHNIQUE: Routine without IV contrast. Multiplanar reformats. Dose reduction techniques were used.    FINDINGS:    Coronal bone algorithm series 7 image 21 shows displaced odontoid tip fracture. This is also seen series 6 image 17. Possible displaced fracture anterior arch of C1. Minimal prominence predental soft tissues.    INTRACRANIAL CONTENTS: No intracranial hemorrhage, extraaxial collection, or mass effect.  No CT evidence of acute infarct. Mild presumed chronic small vessel ischemic changes. Mild generalized volume loss. No hydrocephalus. No evidence for subdural   hemorrhage.    VISUALIZED ORBITS/SINUSES/MASTOIDS: No intraorbital abnormality. No paranasal sinus mucosal disease. No middle ear or mastoid effusion.    BONES/SOFT TISSUES: Advanced degenerative changes both TMJs. Fractures at the C2 and probable C1 level  incompletely evaluated on this study. These have been previously imaged and described on cervical spine CT, see that report described separately.    Results discussed with neurosurgeon at 10/21/2019 11:38 PM      Impression    IMPRESSION:  1.  Fractures upper cervical spine incompletely evaluated.    2.  No acute process intracranially. Nothing for acute infarction. Mild chronic appearing ischemic changes deep white matter both cerebral hemispheres. Nothing for subdural hemorrhage.

## 2019-10-22 NOTE — ED NOTES
"After IV hydromorphone pt noted calling out, attempting to self transfer and stating \"I can't I can't.\" Pt straight cath'd for UA and also to empty bladder which provided some relief from restless behavior. Pt also becoming less oriented unaware of place, time and situation. Placed in Oliver collar for transfer to [facility].   "

## 2019-10-22 NOTE — PROVIDER NOTIFICATION
"Paged LEORA Ruiz, \"FYI: L pupil does not look reactive at all. \" Yassine aware. No new orders.  "

## 2019-10-22 NOTE — PLAN OF CARE
Discharge Planner SLP   Patient plan for discharge: Did not discuss  Current status: Pt seen for clinical swallow evaluation. He is alert, but very confused pulling at things in the room, reaching for things, attempting to pull his nasal cannula out. Pt and his wife deny any dysphagia hx. Pt initially tolerated a sip of thin liquids, however an additional trial resulted in sputtering and an overt cough response and aspiration is supsected. This is felt primarily related to reduced oropharyngeal control and cognition.       Recommend NPO status d/t high risk for aspiration. Dysphagia secondary to current mentation/confusion, resulting in significant oropharyngeal incoordination. Only necessary oral meds in very small amount of puree, ice chips when alert and sitting up in moderation with RN present.     Barriers to return to prior living situation: Below baseline, NPO status  Recommendations for discharge: TCU  Rationale for recommendations: SLP at next level of care for management of dysphagia given swallow function is significantly below baseline          Entered by: Gem Batista 10/22/2019 3:03 PM

## 2019-10-22 NOTE — PROVIDER NOTIFICATION
"Paged Dr. Dominguez, \"Critical troponin at 0.136. Next/last check is at 1400 today. Denies chest pain & tachycardia. \"  "

## 2019-10-22 NOTE — ED NOTES
Frequent rounding, monitoring, and redirection of patient.  Patient remains restless wanting to lie on his side.  Frequent education.  Pt denies that he is pain when on his back.  Unable to verbalize why he wants to be on his side.  Sitter and wife at bedside.

## 2019-10-23 NOTE — PLAN OF CARE
Pt here with acute type 2 odontoid fracture with C1 burst fracture and Severino fracture secondary to fall. A&Ox1-3. Neuros q2h. Inconsistent with neuro exam. When restless/agigated, Eduin.  Slight left field cut noted this AM. Decreased ROM in RUE d/t R rotator cuff injury. Not following most commands, although sometimes slowly. Garbled/incomprehensible speech. NPO even after SLP eval, high risk for aspiration.  Tele ST/SD w/ /PACs.  VSS ex on 5L O2 (baseline)--now requiring 6LO2, tachypnea 28-32 this afternoon (hospitalist aware--solumedrol ordered [on chronic prednisone]--neb given), HTN (IV metoprolol started), tachycardic. Cervical precautions, aspen collar worn at all times. Sitter at bedside for pt pulling at tubes/collar. Pt very restless & agitated at times--ODT zyprexa given with no results. Did not sleep last night or at all today. Tylenol rectal given for pain. Denies chest pain  & SOB. Strict bedrest. Tentative plan for halo placement tomorrow at 1400, Dr. Davis to discuss case with wife. Palliative consult ordered, page them tomorrow to try to be present when neurosurgery rounds.  Echo done, ?a.fib  Recorded on echo--12 lead EKG done. Sitter at bedside for safety. Vieyra placed for retention. Skin tears & bruising throughout body. Frequent oral care.

## 2019-10-23 NOTE — PLAN OF CARE
Pt here with odontoid fracture. A&O to self and hospital. Neuros inconsistent following commands, garbled speech, pupils sluggish L 3mm right 6mm, change in pupil size new and paged neurosurg. Restless and agitated. Lactic back at 2.2, no more interventions. Breathing shallow and rapid, lungs diminished. Aspen collar on at all times. CMS intact. Moving all extremities. VSS. 4L O2. Tele NSR. NPO diet. Vieyra in place for retention. On bedrest. Turn and repo. Collar in place. Denies pain. Plan to talk with palliative prior to Halo placement, family not sure they would like to move ahead with Halo. Continue to monitor.

## 2019-10-23 NOTE — PLAN OF CARE
Speech Language Therapy Discharge Summary    Reason for therapy discharge:    Patient/family request discontinuation of services.    Progress towards therapy goal(s). See goals on Care Plan in [facility] electronic health record for goal details.  Goals not met.  Barriers to achieving goals:   Patient is comfort cares.    Therapy recommendation(s):    No further therapy is recommended.

## 2019-10-23 NOTE — PROVIDER NOTIFICATION
MD Notification    Notified Person: MD    Notified Person Name: Angelica    Notification Date/Time: 10/23/19 9496    Notification Interaction: Paged    Purpose of Notification: High concentration morphine order due to pt being CC    Orders Received: Provider placed orders    Comments:

## 2019-10-23 NOTE — PROVIDER NOTIFICATION
MD Notification    Notified Person: PA    Notified Person Name: Izabela Wesley    Notification Date/Time: 2:30am    Notification Interaction:    Purpose of Notification: Pupil on right 6mm sluggish and left 3mm fixed.     Orders Received: Continue to watch if still following commands and moving all extremities.    3:30am talked with Maryjane no changes, continue to monitor    6:00 spoke with Maryjane, updated about wife's choice to not move forward with Halo and have palliative care meeting

## 2019-10-23 NOTE — PROGRESS NOTES
"SPIRITUAL HEALTH SERVICES Progress Note  -01    Visited pt per Palliative Care Team. Pt, wife, daughter, brother, and a good friend were in the room.    Pt's wife shared that they have been  for 53 years and pt has been living with this chronic issue for years. Wife said that pt \"knows this will happen but not expected this way.\"     Wife mentioned that this good friend had just flown in and the son is coming later tonight. Wife said, \"it will be a relief for him\" and one of their neighber who is a \"good [employer]\" had said \"a very helpful prayer\" for pt. Wife had tears when she was sharing these stories.  listened and will continue be supportive when there is a need.       Greta Dickson  Chaplain Resident  "

## 2019-10-23 NOTE — CODE/RAPID RESPONSE
[facility]    RRT Note  10/22/2019   Time Called: 1931    RRT called for: hypoxia    Assessment & Plan   IMPRESSION & PLAN:    78 yo M w/ PMH of stroke with retinal artery occlusion, 5 L O2 and steroid-dependent interstitial lung disease who was admitted on 10/21/2019 after sustaining a fall with resultant unstable odontoid and C1 burst fracture & Moderate epidural hematoma .  His course has been complicated by delirium associated with opioid analgesic use, new environment.    RN who has been with him since 7 AM 10/22/19 started the day at 5 L but by this afternoon needed to increase oxygen to 6 L.  She felt that he was having a harder time breathing.  He was tachypneic earlier in the day & was treated with nebulizers and steroids with short-lived relief.  This evening RN found patient with accessory muscle use as well as a hypoxic episode with SPO2 of  82-83%.  He has become more hypertensive over the course of the day in the 150s - 170s /110s.  Patient was most recently given at 2.5 mg IV metoprolol at 6 PM.  He has had some paroxysms of A. fib throughout the course of the day.  He was also medicated with Zyprexa at 6 PM for hyperactive delirium.    On my arrival heart rate is 100, blood pressure is 161/108.  Respiratory rate is at least 30.  Patient is wiggling around in the bed having trouble sitting still.    Differential diagnosis:    Acute on chronic hypoxic respiratory failure may be related to aspiration pneumonia as well as cardiogenic pulmonary edema from hypertension and/or new atrial fibrillation.  Considered PE but because of epidural hematoma and regulation is not feasible.  Considered HCAP the patient was just admitted on 1/20/2019.    INTERVENTIONS:  -stat pCXR--> no appreciable discrete infiltrate  -stat abg--> as expected chronic respiratory acidosis with secondary metabolic alkalosis  -stat proBNP, Mg, phos, procal  -IV prn hydralazine for SBP sustained >160  -Consider diuresis  but he does not appear total body volume overloaded and blood pressure and heart rate control may be of more benefit  -SpO2 goal 88-92%  -atbx considered but will hold off on immediate dosing in the absence of any infiltrates or fevers.  Will reevaluate pending procalcitonin  -Change O2 delivery to oxygen mask  -Would avoid noninvasive positive pressure ventilation in this patient.  Should any worsening of his respiratory status admit patient to intensive care.  Anticipate that intubation would be difficult in light of his he have high cervical spine fractures.    Delirium likely multifactorial etiology including recent opioids, environment, possible pain, possible constipation, A/CRF  -schedule tylenol  -schedule dulcolax  -gluc checked was 150mg/dL  -continue sitter  -Maximize nonpharmacologic interventions with frequent reorientation and provision of sensory to etc.  -Continue PRN oral disintegrating tab Zyprexa  -pulm mgmt as above    -disposition: Patient may remain on this unit.    At the end of the RRT: Chest x-ray has been drawn obtained and reviewed.  Labs will be added on to 7pm sample.      I spoke with patient's wife Edie this evening to inform her of the rapid response.  She did describe that he has been too weak to walk lately he has had poor appetite.  She confirmed DNR/DNI.  She also thinks that he could not stand a halo and he can hardly stand life as it is now with his limitations from his ILD.  We discussed BiPAP and how I think that would be contraindicated out of concern for aspiration.  Furthermore she said patient is claustrophobic.  We discussed that if his respiratory status is worsening in the near future we can try medical management but we should otherwise not be aggressive in terms of pulmonary treatment and she again reaffirmed no breathing tubes, but that she would be called of any addition change in his condition.     Interval History     Code Status: DNR/DNI    Allergies   Allergies    Allergen Reactions     Flu Virus Vaccine      Swollen face     Pistachios [Nuts] Swelling and Cough     Not tested medically       Physical Exam   Vital Signs with Ranges:  Temp:  [97  F (36.1  C)-98.2  F (36.8  C)] 97.3  F (36.3  C)  Pulse:  [101-114] 101  Heart Rate:  [] 101  Resp:  [20-32] 28  BP: (110-177)/() 157/108  SpO2:  [91 %-99 %] 93 %  No intake/output data recorded.    Constitutional: Mild acute distress  ENT: Dry oral mucosa  Neck: supple  Pulmonary: Tachypneic with respiratory rate 30 some accessory muscle use, CTAB upper & lower lobes  Cardiovascular: S1S2, hypertensive with SBP's 150s to 160s over 10 8-1 11  GI: NABS/s/NT/ND  Skin/Integumen: No edema, no mottling  Neuro: Able to follow commands like to stick out tongue  Psych: Defer  Extremities: See above    Data     ABG:  -  Recent Labs   Lab 10/22/19  1954   PH 7.39   PCO2 62*   PO2 90   HCO3 37*       Troponin:    Recent Labs   Lab Test 10/22/19  1847   TROPI 0.178*       IMAGING: (X-ray/CT/MRI)   Recent Results (from the past 24 hour(s))   CT Head w/o Contrast    Narrative    EXAM: CT HEAD W/O CONTRAST  LOCATION: [facility]  DATE/TIME: 10/21/2019 11:32 PM    INDICATION: Fall; pain, possible fracture.  COMPARISON: Cervical spine CT 10/21/2019 described separately.  TECHNIQUE: Routine without IV contrast. Multiplanar reformats. Dose reduction techniques were used.    FINDINGS:    Coronal bone algorithm series 7 image 21 shows displaced odontoid tip fracture. This is also seen series 6 image 17. Possible displaced fracture anterior arch of C1. Minimal prominence predental soft tissues.    INTRACRANIAL CONTENTS: No intracranial hemorrhage, extraaxial collection, or mass effect.  No CT evidence of acute infarct. Mild presumed chronic small vessel ischemic changes. Mild generalized volume loss. No hydrocephalus. No evidence for subdural   hemorrhage.    VISUALIZED ORBITS/SINUSES/MASTOIDS: No intraorbital abnormality. No  paranasal sinus mucosal disease. No middle ear or mastoid effusion.    BONES/SOFT TISSUES: Advanced degenerative changes both TMJs. Fractures at the C2 and probable C1 level incompletely evaluated on this study. These have been previously imaged and described on cervical spine CT, see that report described separately.    Results discussed with neurosurgeon at 10/21/2019 11:38 PM      Impression    IMPRESSION:  1.  Fractures upper cervical spine incompletely evaluated.    2.  No acute process intracranially. Nothing for acute infarction. Mild chronic appearing ischemic changes deep white matter both cerebral hemispheres. Nothing for subdural hemorrhage.       CBC with Diff:  Recent Labs   Lab Test 10/22/19  0423 10/21/19  1522   WBC 8.6 7.7   HGB 11.8* 11.4*   * 104*    159   INR  --  0.89        Lactic Acid:    Lab Results   Component Value Date    LACT 1.3 10/21/2019           Comprehensive Metabolic Panel:  Recent Labs   Lab 10/22/19  1341 10/22/19  0423 10/21/19  1522   NA  --  142 137   POTASSIUM  --  4.0 4.2   CHLORIDE  --  95 94   CO2  --  43* >45*   ANIONGAP  --  4 Not Calculated   GLC 89 68* 122*   BUN  --  20 21   CR  --  0.62* 0.60*   GFRESTIMATED  --  >90 >90   GFRESTBLACK  --  >90 >90   SILVINO  --  9.4 9.6   MAG  --   --  2.0   PROTTOTAL  --   --  6.4*   ALBUMIN  --   --  3.3*   BILITOTAL  --   --  0.4   ALKPHOS  --   --  51   AST  --   --  33   ALT  --   --  33     INR:    Recent Labs   Lab Test 10/21/19  1522   INR 0.89     UA:  Recent Labs   Lab 10/21/19  1841   COLOR Yellow   APPEARANCE Clear   URINEGLC Negative   URINEBILI Negative   URINEKETONE 40*   SG 1.022   UBLD Negative   URINEPH 8.5*   PROTEIN 100*   NITRITE Negative   LEUKEST Negative   RBCU 2   WBCU 1     Time Spent on this Encounter   I spent 40 minutes (740-820pm) of critical care time on the unit/floor managing the care of J Luis Wolf. Upon evaluation, this patient had a high probability of imminent or life-threatening  deterioration due to acute on chronic resp failure, which required my direct attention, intervention, and personal management. 100% of my time was spent at the bedside counseling the patient and/or coordinating care regarding services listed in this note. 10 additional mintues speaking w/ pt's wife.     Nimisha Wyman, [employer]  Hospitalist [place] [facility]  [phone]

## 2019-10-23 NOTE — PROVIDER NOTIFICATION
"Oksana Carlton NP paged, \"645 RH: Palliative met w/ pt's wife and they decided on comfort cares. Called OR control desk to cancel. [phone] GAGE Frost\"  "

## 2019-10-23 NOTE — CONSULTS
[facility]  Palliative Care Consultation Note    Patient: J Luis Wolf  Date of Admission:  10/21/2019    Requesting Clinician / Team: Yassine COREY/Neurosurgery  Reason for consult: Goals of care, Patient and family support    Recommendations:    Transition to comfort measures only; stop checking VS, labs, tele     Surgery canceled     For restlessness, haldol and zyprexa have been ineffective. Will trial ativan 1mg IV x1 and reassess. The key to benzos is dosing it correctly to create sleepiness, as opposed to further restlessness. Continue to observe closely. If ineffective, will trial IV thorazine     Dilaudid caused restlessness. For air hunger and pain, will trial morphine 2-4mg IV Q1hr PRN    Continue oxygen via facemask for now. Son is flying in tonight at 2100 and hoping that pt can stay stable for him to get here. Family aware that pt could possibly decompensate before that     Continue cervical collar for now. Family interested in taking it off when son arrives tonight     Atropine, robinul, reposition for secretions     Family aware that life expectancy measured in hours to days at this point. If pt stable tomorrow, will initiate conversations about hospice (no need to talk about this today, please)     These recommendations have been discussed with bedside RN Margot, unit SW Yaz, and Dr. Dominguez.    Batool CARRERA, [facility]  Palliative Medicine   Pager [phone]      Thank you for the opportunity to participate in the care of this patient and family. Our team: will continue to follow.     During regular M-F work hours -- if you are not sure who specifically to contact -- please contact us at [phone].    After regular work hours and on weekends/holidays, you can call our answering service at [phone]. Also, who's on call for us is available in [employer].     Attestation:  Total time on the floor involved in the patient's care: 70 minutes  Total time spent in  counseling/care coordination: >50%    Assessments:  J Luis Wolf is a 79 year old male with pulmonary fibrosis on 5L oxygen at baseline who presents s/p fall, found to have acute type 2 odontoid fracture which is mildly displaced, and an acute C1 burst fracture, along with epidural hematoma. He has been evaluated by NSG and tentative plan was for surgical repair and halo placement. RRT called overnight for acute on chronic respiratory failure and HTN, likely aspiration event.     Today, the patient was seen for:  Goals of care, pt and family support    Visited pt, along with wife Edie and daughter. Pt is lethargic, yet restless, and unable to participate in the conversation. Thus visited primarily with family, who were understandably tearful. We discussed Gonzales's underlying pulmonary fibrosis, which in itself is a terminal condition. He has been in very poor health, and quite unhappy, from that standpoint. Now with his new fractures, he is at severe risk for aspiration and further respiratory compromise and death. Family is distressed by his restlessness/hyperactive delirium, which may have been exacerbated by sleep deprivation, haldol, zyprexa, and dilaudid.    Family very clearly wanting to shift focus toward his comfort as the priority. Son is flying in from [place] tonight, and family hoping to keep him stable until that point. We acknowledge that there is a reasonable chance that Gonzales could still be with us tonight, but he could also deteriorate rapidly. We will do what we can within comfort measures to keep him comfortable, and stable (ie ongoing cervical collar and facemask O2 support).     Prognosis, Goals, & Planning:      Functional Status just prior to hospitalization: 3 (Capable of only limited self-care; needs help with ADLs; in bed/chair >50% of waking hours)      Prognosis, Goals, and/or Advance Care Planning were addressed today: Yes        Summary/Comments: we discussed that Gonzales is dying, and it is very  reasonable to shift focus of care to comfort       Patient's decision making preferences: unable to assess          Patient has decision-making capacity today for complex decisions: No            I have concerns about the patient/family's health literacy today: No           Patient has a completed Health Care Directive: No.       Code status: DNR/DNI    Coping, Meaning, & Spirituality:   Mood, coping, and/or meaning in the context of serious illness were addressed today: Yes  Summary/Comments: pt restless, which is distressing to family. Family understandably tearful, as we acknowledge that Gonzales is dying     Social:     Living situation: at home with wife lynette     [place] family / caregivers: wife lynette, daughter local and present, son in [place] and arriving tonight    History of Present Illness:  History gathered today from: family/loved ones, medical chart, medical team members    J Luis Wolf is a 79 year old male with pulmonary fibrosis steroid dependent on 5L oxygen at baseline, RA, hospitalization this summer for retinal artery occlusion and stroke initiated on plavix and lipitor, and progressive/recent weakness who presents s/p fall, found to have acute type 2 odontoid fracture which is mildly displaced, and an acute C1 burst fracture, along with epidural hematoma. He has been evaluated by NSG and tentative plan was for surgical repair and halo placement. RRT called overnight for acute on chronic respiratory failure and HTN, likely aspiration event.     Key Palliative Symptom Data:  # Pain severity the last 12 hours: moderate  # Dyspnea severity the last 12 hours: moderate  # Anxiety severity the last 12 hours: moderate    ROS:  Comprehensive ROS is reviewed and is negative except as here & per HPI: N/A     Past Medical History:  Past Medical History:   Diagnosis Date     ILD (interstitial lung disease) (H)     associated with rheumatoid arthritis, present on CT since 2013 and also has air trapping on CT,  methotrexate stopped 2013, started MMF 2014 but stopped  due to 20 lb weight loss and anorexia.  Did not tolerate imuran due to chills/fevers.     Rheumatoid arthritis (H)     RA dx , + CCP and RF.        Past Surgical History:  Past Surgical History:   Procedure Laterality Date     APPENDECTOMY       BACK SURGERY      Compression fx thoracic vertebra     COLONOSCOPY       ORTHOPEDIC SURGERY      Bunionectomy lt foot     PERCUTANEOUS BIOPSY LIVER N/A 5/10/2018    Procedure: PERCUTANEOUS BIOPSY LIVER;  Percutaneous Liver Biopsy;  Surgeon: Raymon Gann PA-C;  Location: [facility] OR     SOFT TISSUE SURGERY      Bursitis rt hip cortisone injection 11 15 2015     THORACIC SURGERY      Pulmonary fibrosis         Family History:  Family History   Problem Relation Age of Onset     Coronary Artery Disease Father         Age 77  age 80     Hyperlipidemia Father      Hypertension Mother          age 105     Rheumatoid Arthritis No family hx of       Allergies:  Allergies   Allergen Reactions     Flu Virus Vaccine      Swollen face     Pistachios [Nuts] Swelling and Cough     Not tested medically        Medications:  I have reviewed this patient's medication profile and medications from this hospitalization.   Noted scheduled meds are:  None    Noted PRN meds are:  APAP suppository PRN  Morphine 2-4mg IV Q1hr PRN pain, SOB  Ativan 1-2mg IV Q1hr PRN anxiety, restlessness   Atropine, robinul PRN secretions   Bisacodyl suppository daily PRN    Physical Exam:  Vital Signs: Temp: 95.7  F (35.4  C) Temp src: Axillary BP: (!) 165/52 Pulse: 51 Heart Rate: 103 Resp: 22 SpO2: 94 % O2 Device: Oxymask Oxygen Delivery: 4 LPM  Weight: 122 lbs 0 oz  CONSTITUTIONAL: Chronically ill man seen lying in bed, restless, lethargic. not able to converse. Family present  NECK: Cervical collar  RESPIRATORY: Slightly labored respiratory effort on facemask   : Jarrell     Data reviewed:  Recent imaging  reviewed, my comments on pertinents:   10/23 CT head without acute pathology   10/22 CXR with baseline pulmonary fibrosis   10/21 CT neck with acute type 2 odontoid fracture mildly displaced, acute C1 burst fracture, epidural hematoma, DJD    Recent lab data reviewed, my comments on pertinents:   Creat 0.62  WBC 8.63  Lactate 2.2

## 2019-10-23 NOTE — PROGRESS NOTES
Sepsis Evaluation Progress Note    I was called to see J Luis Wolf due to abnormal vital signs triggering the Sepsis SIRS screening alert. He is not known to have an infection.     Physical Exam   Vital Signs:  Temp: 97.4  F (36.3  C) Temp src: Axillary BP: (!) 133/91 Pulse: 99 Heart Rate: 104 Resp: 24 SpO2: 99 % O2 Device: Oxymask Oxygen Delivery: 3 LPM    Lab:  Lactic Acid   Date Value Ref Range Status   10/21/2019 1.3 0.7 - 2.0 mmol/L Final     Lactate for Sepsis Protocol   Date Value Ref Range Status   10/23/2019 2.2 (H) 0.7 - 2.0 mmol/L Final     Comment:     Significant value called to and read back by  DANY CARRANZA RN IN [id number] RK         The patient has signs of altered level of consciousness suspicious for delirium, seems like he has had some altered mental status since admission, but at this time is stable since how he presented.     The rest of their physical exam is significant for   Gen: awake, alert, follows commands, mild distress  ENT: Dry oral mucosa, cervical collar in place, Oxymask on face  Pulm: Tachypneic, CTAB upper & lower lobes  Cardio: tachycardic, regular rhythm, no murmurs  GI: nontender, nondistended, soft  Skin: No edema, no mottling, slightly diaphoretic    Assessment & Plan   NO EVIDENCE OF SEPSIS at this time.  Vital sign, physical exam, and lab findings are likely due to worsening respiratory status.    Disposition: The patient will remain on the current unit. We will continue to monitor this patient closely. RRT earlier, there was code discussion with patient's wife. She does not want bipap, intubation or aggressive cares. Okay with high flow or oxymask for now.    Carolyn Dinero, DO    Sepsis Criteria   Sepsis: 2+ SIRS criteria due to infection  Severe Sepsis: Sepsis AND 1+ new sign of acute organ dysfunction (Note: lactate >2 is organ dysfunction)  Septic Shock: Sepsis AND hypotension despite volume resuscitation with 30 ml/kg crystalloid

## 2019-10-23 NOTE — PROGRESS NOTES
Got stroke consult for pupillary changes yesterday and today. Spoke to nurse this AM, patient has been made comfort care. Stroke consult has been cancelled.

## 2019-10-23 NOTE — PLAN OF CARE
PT: Pt admitted 10/21/19 with odontoid fracture, C1-C2 fractures, currently on bedrest with cervical collar. Per neurosurgery, plan for Halo placement today, however, per nursing notes, family wanting to meet with palliative before moving forward with halo placement. Will hold PT at this time.     Addendum: Pt now comfort care, PT orders no longer in place.

## 2019-10-23 NOTE — PROGRESS NOTES
[facility]    Hospitalist Progress Note      Assessment & Plan   J Luis Wolf is a 79 year old male with history of interstitial lung disease with chronic respiratory failure on 5 liters oxygen, who presented to [facility] 3 days after a fall and is found to have unstable odontoid and C1 burst fracture.     Comfort care:  Comfort care orders in place per palliative care  - family interested in removing cervical collar when son arrives tonight  - discussed with wife and dtr at bedside  - appreciate palliative care help      Acute type 2 odontoid fracture, mildly displaced.    Acute C1 burst fracture.    Moderate epidural hematoma after a mechanical fall.    The patient sustained a mechanical fall 3 days ago and had worsening back pain and presented to the emergency room at [facility] where a CT cervical spine revealed the above fractures and epidural hematoma. CT head negative for acute intracranial process. Neurosurgery eval on 10/21, recommend halo placement. However, due to wife's concern about patient's overall quality of life even prior to this episode, current mental status and his ability to tolerate Halo placement/surgery, she met with palliative care. After family meeting, family expressed their wish to focus on patient's comfort rather than pursue sugery. He has transitioned to comfort cares on 10/23.   - appreciate palliative care and neurosurgery follow up.   - neurosurgery following    Acute encephalopathy, delirium.     The patient apparently was awake, alert and oriented when he initially presented to [facility]; however, after receiving Dilaudid he got confused and agitated and needed Zyprexa and Haldol. It seems like zyprexa and haldol may have helped initially. However he has remained restless even after Zyprexa administration. He has now received 1mg Ativan and seems to be sleeping comfortable.   - will continue with ativan prn for restless/agitation    Acute on  chronic hypoxic respiratory failure  Interstitial lung disease At baseline, he uses oxygen at 5 liters.   RRT called evening of 10/22 due to increasing respiratory rate and hypoxia. CXR without pneumonia.  Suspect his respiratory status worsening due to his delirium/restlessness and cervical collar in place causing discomfort. No interventions at this time as he is comfort care.     Central retinal artery occlusion.   Hx of CVA  He appears to be on aspirin and Plavix for this.  This will be held given the traumatic epidural hematoma and unstable fracture until we received clearance from Neurosurgery regarding ongoing antiplatelet use.     Rheumatoid arthritis.    - stop all meds    Trop elevation:   Trop 0.055-0.074-0.111-0.136. no wall motion abnormality on echo. Likely demand ischemia in the setting of above. ASA not given due to concern for epidural hematoma. After discussion with wife and goals being more comfort, no medications started and cardiology consultation not pursued.     Atrial fibrillation (new)  Noted to be tachy on tele on 10/22. Echo obtained on the same day commented as rhythm being in A.fib. however, EKG obtained to better get rhythm showed sinus tachy. No further need for monitoring.       CODE: DNR/DNI. Comfort cares  DVT prophy:none  Dispo: pending clinical course, if death not imminent in the next 24-48 hrs, he may discharge to hospice.    Angelika Dominguez MD  Text Page  (7am to 6pm)    Interval History   Sleeping comfortably. Did not attempt to wake up. Wife and daughter at bedside.     -Data reviewed today: I reviewed all new labs and imaging results over the last 24 hours. I personally reviewed the head CT image(s) showing as mentioned above.    Physical Exam   Temp: 95.7  F (35.4  C) Temp src: Axillary BP: (!) 165/52 Pulse: 51 Heart Rate: 103 Resp: 22 SpO2: 94 % O2 Device: Oxymask Oxygen Delivery: 4 LPM  Vitals:    10/21/19 2244   Weight: 55.3 kg (122 lb)     Vital Signs with  Ranges  Temp:  [95.7  F (35.4  C)-97.7  F (36.5  C)] 95.7  F (35.4  C)  Pulse:  [] 51  Heart Rate:  [101-110] 103  Resp:  [22-32] 22  BP: (133-191)/() 165/52  SpO2:  [92 %-99 %] 94 %  I/O last 3 completed shifts:  In: -   Out: 950 [Urine:950]    Constitutional: Sleeping, did not attempt arousing  Respiratory: Breathing comfortable  Cardiovascular: ext warm, palpable pulses check in UE  GI: soft         Medications         Data   Recent Labs   Lab 10/23/19  0955 10/23/19  0559 10/23/19  0121  10/22/19  1341  10/22/19  0423  10/21/19  1522   WBC  --   --   --   --   --   --  8.6  --  7.7   HGB  --   --   --   --   --   --  11.8*  --  11.4*   MCV  --   --   --   --   --   --  102*  --  104*   PLT  --   --   --   --   --   --  155  --  159   INR  --   --   --   --   --   --   --   --  0.89   NA  --   --   --   --   --   --  142  --  137   POTASSIUM  --   --   --   --   --   --  4.0  --  4.2   CHLORIDE  --   --   --   --   --   --  95  --  94   CO2  --   --   --   --   --   --  43*  --  >45*   BUN  --   --   --   --   --   --  20  --  21   CR  --   --   --   --   --   --  0.62*  --  0.60*   ANIONGAP  --   --   --   --   --   --  4  --  Not Calculated   SILVINO  --   --   --   --   --   --  9.4  --  9.6   GLC  --   --   --   --  89  --  68*  --  122*   ALBUMIN  --   --   --   --   --   --   --   --  3.3*   PROTTOTAL  --   --   --   --   --   --   --   --  6.4*   BILITOTAL  --   --   --   --   --   --   --   --  0.4   ALKPHOS  --   --   --   --   --   --   --   --  51   ALT  --   --   --   --   --   --   --   --  33   AST  --   --   --   --   --   --   --   --  33   TROPI 0.151* 0.117* 0.129*   < > 0.162*   < > 0.090*   < > 0.055*    < > = values in this interval not displayed.       Recent Results (from the past 24 hour(s))   XR Chest Port 1 View    Narrative    CHEST PORTABLE ONE VIEW   10/22/2019 8:00 PM     HISTORY: RRT.    COMPARISON: Chest x-ray 10/21/2019.      Impression    IMPRESSION: Portable semiupright  view of the chest demonstrates  pulmonary fibrotic changes. Patchy airspace opacities are unchanged.  Heart size appears within normal limits. No significant pleural  effusions or pneumothorax.    ZINA GEORGE MD   CT Head w/o Contrast    Narrative    CT OF THE HEAD WITHOUT CONTRAST 10/23/2019 9:43 AM     COMPARISON: Head CT 10/21/2019    HISTORY: Altered level of consciousness (LOC), unexplained.    TECHNIQUE: 5 mm thick axial CT images of the head were acquired  without IV contrast material.    FINDINGS: This study is mildly limited by patient motion. There is  mild diffuse cerebral volume loss. There are subtle patchy areas of  decreased density in the cerebral white matter bilaterally that are  consistent with sequela of chronic small vessel ischemic disease.    The ventricles and basal cisterns are within normal limits in  configuration given the degree of cerebral volume loss. There is no  midline shift. There are no extra-axial fluid collections.    No intracranial hemorrhage, mass or recent infarct.    The visualized paranasal sinuses are well-aerated. There is no  mastoiditis. There are no fractures of the visualized bones.      Impression    IMPRESSION: Diffuse cerebral volume loss and cerebral white matter  changes consistent with chronic small vessel ischemic disease. No  evidence for acute intracranial pathology.        Radiation dose for this scan was reduced using automated exposure  control, adjustment of the mA and/or kV according to patient size, or  iterative reconstruction technique.

## 2019-10-23 NOTE — PLAN OF CARE
Pt here with fall, unstable C1 fracture. Pt was restless and not following commands this am. Neuros: R pupil > L, but moves all extremities. CT was done for pupil changes, results normal. VSS. Clear liquid diet available if pt requests. Bedrest w/ c-collar on. Vieyra in place for retention. Incontinent BM this am, small smear. Denies pain. Transitioned to comfort cares per palliative after discussion w/ wife. Ativan given X1 w/ positive results. Pt sleeping soundly w/ family in the room. Pt scoring yellow on the Aggression Stop Light Tool for confusion when more alert. Plan for more family to come this evening. Wife would like c-collar removed once more family here. Morphine available to pain or air hunger. Nursing will continue to monitor.

## 2019-10-23 NOTE — PROGRESS NOTES
[facility]    Neurosurgery Progress Note    Date of Service (when I saw the patient): 10/23/2019     Assessment & Plan   J Luis Wolf is a 79 year old male who was admitted on 10/21/2019. He presented to the ED s/p fall at home. Imaging revealed an acute type II odontoid fracture as well as an acute C1 burst fracture. He was placed in a cervical collar and plan to proceed to OR for halo fixation this afternoon. Wife undecided if she would like to proceed with halo application today. Overnight he was noted to have pupil changes. On exam pupils unequal and sluggish. Left side weaker than right. Not following commands. Speech garbled.     Active Problems:    Odontoid fracture (H)    Assessment: acute type II odontoid fracture and C1 burst fracture    Plan:   -Head CT today with no evidence for acute intracranial pathology  -Neurology consult  -Palliative care to see today to discuss goals of care      I have discussed the following assessment and plan Dr. Davis who is in agreement with initial plan and will follow up with further consultation recommendations.    Oksana Carlton CNP  [facility]  [place] [address]    Tel [phone]  Pager [phone]      Interval History   Pupil changes overnight, head CT stable    Physical Exam   Temp: 95.7  F (35.4  C) Temp src: Axillary BP: (!) 165/52 Pulse: 51 Heart Rate: 103 Resp: 22 SpO2: 94 % O2 Device: Oxymask Oxygen Delivery: 4 LPM  Vitals:    10/21/19 2244   Weight: 122 lb (55.3 kg)     Vital Signs with Ranges  Temp:  [95.7  F (35.4  C)-98.2  F (36.8  C)] 95.7  F (35.4  C)  Pulse:  [] 51  Heart Rate:  [] 103  Resp:  [20-32] 22  BP: (133-191)/() 165/52  SpO2:  [92 %-99 %] 94 %  I/O last 3 completed shifts:  In: -   Out: 950 [Urine:950]    Heart Rate: 103, Blood pressure (!) 165/52, pulse 51, temperature 95.7  F (35.4  C), temperature source Axillary,  resp. rate 22, weight 122 lb (55.3 kg), SpO2 94 %.  122 lbs 0 oz  HEENT:  Normocephalic.  Right pupil 6mm and sluggish, left pupil 3mm sluggish.  Heart:  No peripheral edema  Lungs:  No SOB  Skin:  Warm and dry, good capillary refill.  Extremities:  Good radial and dorsalis pedis pulses bilaterally, no edema, cyanosis or clubbing.    NEUROLOGICAL EXAMINATION:   Mental status:  Alert, not following commands, garbled speech  Motor:  Left > right weakness     Medications       LORazepam  1 mg Intravenous Once       Data     CBC RESULTS:   Recent Labs   Lab Test 10/22/19  0423   WBC 8.6   RBC 3.78*   HGB 11.8*   HCT 38.5*   *   MCH 31.2   MCHC 30.6*   RDW 13.5        Basic Metabolic Panel:  Lab Results   Component Value Date     10/22/2019      Lab Results   Component Value Date    POTASSIUM 4.0 10/22/2019     Lab Results   Component Value Date    CHLORIDE 95 10/22/2019     Lab Results   Component Value Date    SILVINO 9.4 10/22/2019     Lab Results   Component Value Date    CO2 43 10/22/2019     Lab Results   Component Value Date    BUN 20 10/22/2019     Lab Results   Component Value Date    CR 0.62 10/22/2019     Lab Results   Component Value Date    GLC 89 10/22/2019     INR:  Lab Results   Component Value Date    INR 0.89 10/21/2019    INR 1.00 04/24/2018

## 2019-10-24 NOTE — DEATH PRONOUNCEMENT
MD DEATH PRONOUNCEMENT    Called to pronounce J Luis Wolf dead.    Physical Exam: Unresponsive to noxious stimuli, Spontaneous respirations absent, Breath sounds absent, Carotid pulse absent and Heart sounds absent    Patient was pronounced dead at .    Preliminary Cause of Death: Epidural hematoma and C1 fx after mechanical fall    Active Problems:    Odontoid fracture (H)       Infectious disease present?: NO    Communicable disease present? (examples: HIV, chicken pox, TB, Ebola, CJD) :  NO    Multi-drug resistant organism present? (example: MRSA): NO    Please consider an autopsy if any of the following exist:  NO Unexpected or unexplained death during or following any dental, medical, or surgical diagnostic treatment procedures.   NO Death of mother at or up to seven days after delivery.     NO All  and pediatric deaths.     NO Death where the cause is sufficiently obscure to delay completion of the death certificate.   NO Deaths in which autopsy would confirm a suspected illness/condition that would affect surviving family members or recipients of transplanted organs.     The following deaths must be reported to the 's Office:  NO A death that may be due entirely or in part to any factors other than natural disease (recent surgery, recent trauma, suspected abuse/neglect).   NO A death that may be an accident, suicide, or homicide.     NO Any sudden, unexpected death in which there is no prior history of significant heart disease or any other condition associated with sudden death.   NO A death under suspicious, unusual, or unexpected circumstances.    NO Any death which is apparently due to natural causes but in which the  does not have a personal physician familiar with the patient s medical history, social, or environmental situation or the circumstances of the terminal event.   NO Any death apparently due to Sudden Infant Death Syndrome.     NO Deaths that  occur during, in association with, or as consequences of a diagnostic, therapeutic, or anesthetic procedure.   NO Any death in which a fracture of a major bone has occurred within the past (6) six months.   NO A death of persons note seen by their physician within 120 days of demise.     NO Any death in which the  was an inmate of a public institution or was in the custody of Law Enforcement personnel.   NO  All unexpected deaths of children   NO Solid organ donors   NO Unidentified bodies   NO Deaths of persons whose bodies are to be cremated or otherwise disposed of so that the bodies will later be unavailable for examination;   NO Deaths unattended by a physician outside of a licensed healthcare facility or licensed residential hospice program   NO Deaths occurring within 24 hours of arrival to a health care facility if death is unexpected.    NO Deaths associated with the decedent s employment.   NO Deaths attributed to acts of terrorism.   NO Any death in which there is uncertainty as to whether it is a medical examiner s care should be discussed with the medical investigator.        Body disposition: Body released to the morgue.    DEANDRE Hassan, CNP  Hospitalist - [facility]  Text Page  ([phone])

## 2019-10-24 NOTE — PLAN OF CARE
Comfort Care. Family at bedside. Atropine drops given x2 for secretions. High concentrate morphine available. Okay to remove collar if family wants. Daughter staying overnight. On 5L ffor comfort,this is pt's baseline. Suction at bedside as well.

## 2019-10-24 NOTE — PROGRESS NOTES
Cardiopulmonary Rehab Discharge Summary    Reason for discharge:    Change in medical status.  Patient had a recent stay in the hosptial. While writing this note discovered patient is .    Progress towards goals:  Goals not met.  Barriers to achieving goals:   limited tolerance for therapy and Patient was in the hospital. .      Physician cosignature/electronic signature indicates approval of this ITP document. I have established, reviewed and made necessary changes to the individualized treatment plan and exercise prescription for this patient.

## 2019-10-24 NOTE — PROGRESS NOTES
Pt on comfort cares. Given atropine drops for secretions and positioned comfortably. Pt pronounced dead at 0215 with daughter present. Daughter unsure about  home arrangements, ANS contact information given. Pt transferred to [facility] with security.

## 2019-10-25 NOTE — DISCHARGE SUMMARY
[facility]    Death Summary - Hospitalist Service     Date of Admission:  10/21/2019  Date of Death: 10/24/2019  3:34 AM  Discharging Provider: Angelika Dominguez MD    Hospital Course   J Luis Wolf is a 79 year old male with history of interstitial lung disease with chronic respiratory failure on 5 liters oxygen, who presented to [facility] 3 days after a fall and is found to have unstable odontoid and C1 burst fracture. Hospital course is further complicated by delirium.  See detail of hospital course by problem as below.    Acute type 2 odontoid fracture, mildly displaced.   Acute C1 burst fracture.   Moderate epidural hematoma after a mechanical fall.    The patient sustained a mechanical fall 3 days ago and had worsening back pain and presented to the emergency room at [facility] where a CT cervical spine revealed the above fractures and epidural hematoma. CT head negative for acute intracranial process. Neurosurgery eval on 10/21, recommend halo placement. However, due to wife's concern about patient's overall quality of life even prior to this episode, current mental status and his ability to tolerate Halo placement/surgery. Family met with palliative care. After family meeting, family expressed their wish to focus on patient's comfort rather than pursue sugery. He has transitioned to comfort cares on 10/23 and passed away early am of 10/24.    Acute encephalopathy, delirium.     The patient apparently was awake, alert and oriented when he initially presented to [facility]; however, after receiving Dilaudid he got confused and agitated and needed Zyprexa and Haldol. It seems like zyprexa and haldol may have helped initially. However he has remained restless even after Zyprexa administration. Ativan helped with restlessness    Acute on chronic hypoxic respiratory failure  Secondary to pulmonary fibrosis  At baseline, he uses oxygen at 5 liters.   RRT called evening of  10/22 due to increasing respiratory rate and hypoxia. CXR without pneumonia.  Suspect his respiratory status worsening due to his delirium/restlessness and cervical collar in place causing discomfort. No interventions at this time as he is comfort care.     Central retinal artery occlusion.   Hx of CVA  He appears to be on aspirin and Plavix for this.  This will be held given the traumatic epidural hematoma and unstable fracture until we received clearance from Neurosurgery regarding ongoing antiplatelet use.     Rheumatoid arthritis.    - stop all meds    Trop elevation:   Trop 0.055-0.074-0.111-0.136. no wall motion abnormality on echo. Likely demand ischemia in the setting of above. ASA not given due to concern for epidural hematoma. After discussion with wife and goals being more comfort, no medications started and cardiology consultation not pursued.     Atrial fibrillation (new)  Noted to be tachy on tele on 10/22. Echo obtained on the same day commented as rhythm being in A.fib. however, EKG obtained to better get rhythm showed sinus tachy. No further need for monitoring.       Cause of death:  Acute odontoid fracture after mechanical fall and epidural hematoma  Respiratory failure secondary to pulmonary fibrosis       Angelika Dominguez MD  [facility]  ______________________________________________________________________      Significant Results and Procedures   Most Recent 3 CBC's:  Recent Labs   Lab Test 10/22/19  0423 10/21/19  1522 07/22/19  0858   WBC 8.6 7.7 8.4   HGB 11.8* 11.4* 12.9*   * 104* 104*    159 167     Most Recent 3 BMP's:  Recent Labs   Lab Test 10/22/19  1341 10/22/19  0423 10/21/19  1522 07/23/19  0611   NA  --  142 137 138   POTASSIUM  --  4.0 4.2 4.2   CHLORIDE  --  95 94 95   CO2  --  43* >45* 42*   BUN  --  20 21 17   CR  --  0.62* 0.60* 0.57*   ANIONGAP  --  4 Not Calculated 1*   SILVINO  --  9.4 9.6 8.7   GLC 89 68* 122* 130*   ,   Results for orders  placed or performed during the hospital encounter of 10/21/19   CT Head w/o Contrast    Narrative    EXAM: CT HEAD W/O CONTRAST  LOCATION: [facility]  DATE/TIME: 10/21/2019 11:32 PM    INDICATION: Fall; pain, possible fracture.  COMPARISON: Cervical spine CT 10/21/2019 described separately.  TECHNIQUE: Routine without IV contrast. Multiplanar reformats. Dose reduction techniques were used.    FINDINGS:    Coronal bone algorithm series 7 image 21 shows displaced odontoid tip fracture. This is also seen series 6 image 17. Possible displaced fracture anterior arch of C1. Minimal prominence predental soft tissues.    INTRACRANIAL CONTENTS: No intracranial hemorrhage, extraaxial collection, or mass effect.  No CT evidence of acute infarct. Mild presumed chronic small vessel ischemic changes. Mild generalized volume loss. No hydrocephalus. No evidence for subdural   hemorrhage.    VISUALIZED ORBITS/SINUSES/MASTOIDS: No intraorbital abnormality. No paranasal sinus mucosal disease. No middle ear or mastoid effusion.    BONES/SOFT TISSUES: Advanced degenerative changes both TMJs. Fractures at the C2 and probable C1 level incompletely evaluated on this study. These have been previously imaged and described on cervical spine CT, see that report described separately.    Results discussed with neurosurgeon at 10/21/2019 11:38 PM      Impression    IMPRESSION:  1.  Fractures upper cervical spine incompletely evaluated.    2.  No acute process intracranially. Nothing for acute infarction. Mild chronic appearing ischemic changes deep white matter both cerebral hemispheres. Nothing for subdural hemorrhage.   XR Chest Port 1 View    Narrative    CHEST PORTABLE ONE VIEW   10/22/2019 8:00 PM     HISTORY: RRT.    COMPARISON: Chest x-ray 10/21/2019.      Impression    IMPRESSION: Portable semiupright view of the chest demonstrates  pulmonary fibrotic changes. Patchy airspace opacities are unchanged.  Heart size appears  within normal limits. No significant pleural  effusions or pneumothorax.    ZINA GEORGE MD   CT Head w/o Contrast    Narrative    CT OF THE HEAD WITHOUT CONTRAST 10/23/2019 9:43 AM     COMPARISON: Head CT 10/21/2019    HISTORY: Altered level of consciousness (LOC), unexplained.    TECHNIQUE: 5 mm thick axial CT images of the head were acquired  without IV contrast material.    FINDINGS: This study is mildly limited by patient motion. There is  mild diffuse cerebral volume loss. There are subtle patchy areas of  decreased density in the cerebral white matter bilaterally that are  consistent with sequela of chronic small vessel ischemic disease.    The ventricles and basal cisterns are within normal limits in  configuration given the degree of cerebral volume loss. There is no  midline shift. There are no extra-axial fluid collections.    No intracranial hemorrhage, mass or recent infarct.    The visualized paranasal sinuses are well-aerated. There is no  mastoiditis. There are no fractures of the visualized bones.      Impression    IMPRESSION: Diffuse cerebral volume loss and cerebral white matter  changes consistent with chronic small vessel ischemic disease. No  evidence for acute intracranial pathology.        Radiation dose for this scan was reduced using automated exposure  control, adjustment of the mA and/or kV according to patient size, or  iterative reconstruction technique.    SPENCER BUTTERFIELD MD   Echocardiogram Complete    Narrative    [id number]  [id number]  [id number]  [id number]^JOY^ELIGIO^EDOSSA           [facility]  Echocardiography Laboratory  [address] [place] [address]        Name: BOUCHRA DOMINGUEZ  MRN: [id number]  : 1940  Study Date: 10/22/2019 01:53 PM  Age: 79 yrs  Gender: Male  Patient Location: [facility]  Reason For Study: Elevated troponins  Ordering Physician: ELIGIO HAMILTON  Referring Physician: Chencho Cobb  Performed By: Mario Patel RDCS     BSA: 1.6  m2  Height: 67 in  Weight: 122 lb  HR: 119  BP: 161/86 mmHg  _____________________________________________________________________________  __        Procedure  Complete Portable Echo Adult. Optison (NDC #7604-6506) given intravenously.  _____________________________________________________________________________  __        Interpretation Summary     The left ventricle is normal in size.  Left ventricular systolic function is normal.  The visual ejection fraction is estimated at 55-60%.  Normal left ventricular wall motion  There is mild to moderate (1-2+) mitral regurgitation.  There is moderate mitral annular calcification.  Mild valvular aortic stenosis.  The rhythm was rapid atrial fibrillation.     Since the previous study, afib and mild AS are new. HR is now rapid.  _____________________________________________________________________________  __        Left Ventricle  The left ventricle is normal in size. There is normal left ventricular wall  thickness. Left ventricular systolic function is normal. The visual ejection  fraction is estimated at 55-60%. Left ventricular diastolic function is  indeterminate. Normal left ventricular wall motion.     Right Ventricle  The right ventricle is normal in structure, function and size.     Atria  Normal left atrial size. Right atrial size is normal. There is no atrial shunt  seen.     Mitral Valve  There is moderate mitral annular calcification. There is mild to moderate (1-  2+) mitral regurgitation.        Tricuspid Valve  The tricuspid valve is normal in structure and function. There is trace  tricuspid regurgitation. Right ventricular systolic pressure could not be  approximated due to inadequate tricuspid regurgitation.     Aortic Valve  No aortic regurgitation is present. The calculated aortic valve are is 1.7  cm^2. The peak AoV pressure gradient is 25.3 mmHg. The mean AoV pressure  gradient is 12.2 mmHg. Mild valvular aortic stenosis.     Pulmonic Valve  The  pulmonic valve is not well seen, but is grossly normal. There is trace  pulmonic valvular regurgitation.     Vessels  Normal size aorta.     Pericardium  There is no pericardial effusion.        Rhythm  The rhythm was rapid atrial fibrillation.  _____________________________________________________________________________  __  MMode/2D Measurements & Calculations  IVSd: 1.2 cm     LVIDd: 4.0 cm  LVIDs: 3.0 cm  LVPWd: 1.00 cm  FS: 24.7 %  LV mass(C)d: 146.0 grams  LV mass(C)dI: 89.1 grams/m2  Ao root diam: 3.7 cm  LA dimension: 4.2 cm  asc Aorta Diam: 3.5 cm  LA/Ao: 1.1  LVOT diam: 2.2 cm  LVOT area: 3.9 cm2  RWT: 0.49        Doppler Measurements & Calculations  MV E max kumar: 67.9 cm/sec  MV A max kumar: 137.3 cm/sec  MV E/A: 0.49  MV max P.1 mmHg  MV mean P.5 mmHg  MV V2 VTI: 20.4 cm  MVA(VTI): 3.2 cm2  MV dec time: 0.28 sec  Ao V2 max: 250.0 cm/sec  Ao max P.3 mmHg  Ao V2 mean: 161.9 cm/sec  Ao mean P.2 mmHg  Ao V2 VTI: 36.7 cm  SHANE(I,D): 1.7 cm2  SHANE(V,D): 1.8 cm2  LV V1 max P.4 mmHg  LV V1 max: 115.7 cm/sec  LV V1 VTI: 16.7 cm  SV(LVOT): 64.2 ml  SI(LVOT): 39.2 ml/m2  AV Kumar Ratio (DI): 0.46     SHANE Index (cm2/m2): 1.1  E/E' av.3  Lateral E/e': 13.9  Medial E/e': 12.7           _____________________________________________________________________________  __           Report approved by: Cecelia Scott 10/22/2019 03:24 PM            Consultations This Hospital Stay   PHYSICAL THERAPY ADULT IP CONSULT  ORTHOSIS CERVICAL COLLAR IP CONSULT  SPEECH LANGUAGE PATH ADULT IP CONSULT  PALLIATIVE CARE ADULT IP CONSULT  CARDIOLOGY IP CONSULT  CARE TRANSITION RN/SW IP CONSULT  NEUROLOGY IP CONSULT    Primary Care Physician   Mario Foster    Time Spent on this Encounter   I, Angelika Dominguez MD, discharged this patient today but I did not personally see the patient today and will not be billing for the patient's discharge.
